# Patient Record
Sex: MALE | Race: WHITE | NOT HISPANIC OR LATINO | Employment: FULL TIME | URBAN - METROPOLITAN AREA
[De-identification: names, ages, dates, MRNs, and addresses within clinical notes are randomized per-mention and may not be internally consistent; named-entity substitution may affect disease eponyms.]

---

## 2017-02-13 ENCOUNTER — HOSPITAL ENCOUNTER (EMERGENCY)
Facility: CLINIC | Age: 27
Discharge: HOME | End: 2017-02-13
Attending: EMERGENCY MEDICINE | Admitting: EMERGENCY MEDICINE
Payer: COMMERCIAL

## 2017-02-13 VITALS
OXYGEN SATURATION: 100 % | DIASTOLIC BLOOD PRESSURE: 81 MMHG | HEIGHT: 65 IN | RESPIRATION RATE: 18 BRPM | SYSTOLIC BLOOD PRESSURE: 137 MMHG | BODY MASS INDEX: 28.32 KG/M2 | TEMPERATURE: 97.6 F | WEIGHT: 170 LBS

## 2017-02-13 DIAGNOSIS — R06.02 SOB (SHORTNESS OF BREATH): ICD-10-CM

## 2017-02-13 DIAGNOSIS — R07.89 CHEST PRESSURE: ICD-10-CM

## 2017-02-13 DIAGNOSIS — T50.905A MEDICATION REACTION, INITIAL ENCOUNTER: ICD-10-CM

## 2017-02-13 LAB
GLUCOSE BLDC GLUCOMTR-MCNC: 214 MG/DL (ref 70–99)
INTERPRETATION ECG - MUSE: NORMAL

## 2017-02-13 PROCEDURE — 99284 EMERGENCY DEPT VISIT MOD MDM: CPT

## 2017-02-13 PROCEDURE — 00000146 ZZHCL STATISTIC GLUCOSE BY METER IP

## 2017-02-13 PROCEDURE — 93005 ELECTROCARDIOGRAM TRACING: CPT

## 2017-02-13 ASSESSMENT — ENCOUNTER SYMPTOMS
SHORTNESS OF BREATH: 1
LIGHT-HEADEDNESS: 1
DIAPHORESIS: 1
PALPITATIONS: 1

## 2017-02-13 NOTE — ED AVS SNAPSHOT
Emergency Department    [address] [facility] [address]    Phone:  [phone]    Fax:  [phone]                                       Ubaldo Zamora   MRN: [id number]    Department:   Emergency Department   Date of Visit:  2/13/2017           Patient Information     Date Of Birth          1990        Your diagnoses for this visit were:     Chest pressure - resolved     SOB (shortness of breath) - resolved     Medication reaction, initial encounter - possible        You were seen by Trierweiler, Chad A, MD.      Follow-up Information     Follow up with Primary care physician In 1 week.        Follow up with  Emergency Department.    Specialty:  EMERGENCY MEDICINE    Why:  If symptoms worsen    Contact information:    [address] [facility] [address] [phone]        Discharge Instructions       Discharge Instructions  Chest Pain    You have been seen today for chest pain or discomfort.  At this time, your doctor has found no signs that your chest pain is due to a serious or life-threatening condition, (or you have declined more testing and/or admission to the hospital). However, sometimes there is a serious problem that does not show up right away. Your evaluation today may not be complete and you may need further testing and evaluation.     You need to follow-up with your regular doctor within 3 days.    Return to the Emergency Department if:    Your chest pain changes, gets worse, starts to happen more often, or comes with less activity.    You are short of breath.    You get very weak or tired.    You pass out or faint.    You have any new symptoms, like fever, cough, numb legs, or you cough up blood.    You have anything else that worries you.    Until you follow-up with your regular doctor please do the following:    Take one aspirin daily unless you have an allergy or are told not to by your doctor.    If a stress test appointment has been made, go  to the appointment.    If you have questions, contact your regular doctor.    If your doctor today has told you to follow-up with your regular doctor, it is very important that you make an appointment with your clinic and go to the appointment.  If you do not follow-up with your primary doctor, it may result in missing an important development which could result in permanent injury or disability and/or lasting pain.  If there is any problem keeping your appointment, call your doctor or return to the Emergency Department.    If you were given a prescription for medicine here today, be sure to read all of the information (including the package insert) that comes with your prescription.  This will include important information about the medicine, its side effects, and any warnings that you need to know about.  The pharmacist who fills the prescription can provide more information and answer questions you may have about the medicine.  If you have questions or concerns that the pharmacist cannot address, please call or return to the Emergency Department.           24 Hour Appointment Hotline       To make an appointment at any [facility], call [phone] ([phone]). If you don't have a family doctor or clinic, we will help you find one. [facility] are conveniently located to serve the needs of you and your family.             Review of your medicines      Our records show that you are taking the medicines listed below. If these are incorrect, please call your family doctor or clinic.        Dose / Directions Last dose taken    benzocaine 20 % Gel gel   Commonly known as:  ORAJEL MAXIMUM STRENGTH   Quantity:  14 g        Take by mouth 4 times daily as needed for moderate pain   Refills:  2        * blood glucose monitoring meter device kit   Quantity:  1 kit        Use to test blood sugars 2 times daily or as directed.   Refills:  0        * blood glucose monitoring meter device kit   Commonly known  as:  no brand specified   Quantity:  1 kit        Use to test blood sugar 2 times daily or as directed.   Refills:  0        blood glucose monitoring test strip   Commonly known as:  no brand specified   Quantity:  1 Box        Use to test blood sugars 2 times daily or as directed   Refills:  0        cephALEXin 500 MG capsule   Commonly known as:  KEFLEX   Dose:  500 mg   Quantity:  40 capsule        Take 1 capsule (500 mg) by mouth 4 times daily   Refills:  0        * gemfibrozil 600 MG tablet   Commonly known as:  LOPID   Dose:  300 mg   Quantity:  60 tablet        Take 0.5 tablets by mouth 2 times daily (before meals).   Refills:  0        * gemfibrozil 600 MG tablet   Commonly known as:  LOPID   Quantity:  60 tablet        300 mg 2X daily before meals   Refills:  1        HYDROcodone-acetaminophen 5-325 MG per tablet   Commonly known as:  NORCO   Dose:  1-2 tablet   Quantity:  15 tablet        Take 1-2 tablets by mouth every 6 hours as needed for moderate to severe pain   Refills:  0        insulin aspart 100 UNIT/ML injection   Commonly known as:  NovoLOG FLEXPEN   Quantity:  9 mL        4 units before breakfast, 4 units before lunch, 4 units before dinner   Refills:  1        * insulin glargine 100 UNIT/ML injection   Commonly known as:  LANTUS        Inject Subcutaneous At Bedtime   Refills:  0        * insulin glargine 100 UNIT/ML injection   Commonly known as:  LANTUS SOLOSTAR   Quantity:  9 mL        20 units every morning   Refills:  3        metFORMIN 500 MG 24 hr tablet   Commonly known as:  GLUCOPHAGE-XR   Dose:  1000 mg   Quantity:  30 tablet        Take 2 tablets (1,000 mg) by mouth daily (with dinner)   Refills:  1        omega-3 acid ethyl esters 1 G capsule   Commonly known as:  Lovaza   Dose:  2 g   Quantity:  120 capsule        Take 2 capsules (2 g) by mouth daily   Refills:  3        * Notice:  This list has 6 medication(s) that are the same as other medications prescribed for you. Read the  directions carefully, and ask your doctor or other care provider to review them with you.            Procedures and tests performed during your visit     EKG 12-lead, tracing only    Glucose by meter    Glucose monitor nursing POCT      Orders Needing Specimen Collection     None      Pending Results     No orders found from 2/11/2017 to 2/14/2017.            Pending Culture Results     No orders found from 2/11/2017 to 2/14/2017.             Test Results from your hospital stay     2/13/2017 10:31 PM - Interface, Flexilab Results      Component Results     Component Value Ref Range & Units Status    Glucose 214 (H) 70 - 99 mg/dL Final                Clinical Quality Measure: Blood Pressure Screening     Your blood pressure was checked while you were in the emergency department today. The last reading we obtained was  BP: 137/81 . Please read the guidelines below about what these numbers mean and what you should do about them.  If your systolic blood pressure (the top number) is less than 120 and your diastolic blood pressure (the bottom number) is less than 80, then your blood pressure is normal. There is nothing more that you need to do about it.  If your systolic blood pressure (the top number) is 120-139 or your diastolic blood pressure (the bottom number) is 80-89, your blood pressure may be higher than it should be. You should have your blood pressure rechecked within a year by a primary care provider.  If your systolic blood pressure (the top number) is 140 or greater or your diastolic blood pressure (the bottom number) is 90 or greater, you may have high blood pressure. High blood pressure is treatable, but if left untreated over time it can put you at risk for heart attack, stroke, or kidney failure. You should have your blood pressure rechecked by a primary care provider within the next 4 weeks.  If your provider in the emergency department today gave you specific instructions to follow-up with your doctor  or provider even sooner than that, you should follow that instruction and not wait for up to 4 weeks for your follow-up visit.        Thank you for choosing [facility]       Thank you for choosing [facility] for your care. Our goal is always to provide you with excellent care. Hearing back from our patients is one way we can continue to improve our services. Please take a few minutes to complete the written survey that you may receive in the mail after you visit with us. Thank you!        [employer] Information     [employer] gives you secure access to your electronic health record. If you see a primary care provider, you can also send messages to your care team and make appointments. If you have questions, please call your primary care clinic.  If you do not have a primary care provider, please call [phone] and they will assist you.        Care EveryWhere ID     This is your Care EveryWhere ID. This could be used by other organizations to access your [facility] medical records  [id number]        After Visit Summary       This is your record. Keep this with you and show to your community pharmacist(s) and doctor(s) at your next visit.

## 2017-02-13 NOTE — ED AVS SNAPSHOT
Emergency Department    [address] [place] [address]    Phone:  [phone]    Fax:  [phone]                                       Ubaldo Zamora   MRN: [id number]    Department:   Emergency Department   Date of Visit:  2/13/2017           After Visit Summary Signature Page     I have received my discharge instructions, and my questions have been answered. I have discussed any challenges I see with this plan with the nurse or doctor.    ..........................................................................................................................................  Patient/Patient Representative Signature      ..........................................................................................................................................  Patient Representative Print Name and Relationship to Patient    ..................................................               ................................................  Date                                            Time    ..........................................................................................................................................  Reviewed by Signature/Title    ...................................................              ..............................................  Date                                                            Time

## 2017-02-14 NOTE — ED PROVIDER NOTES
History     Chief Complaint:  Chest Pain     HPI   Ubaldo Zamora is a 26 year old male with a history of Type 1 diabetes who presents with chest pain. The patient states that he stopped taking his insulin approximately 5 months ago and has not been checking his blood sugars. He states that today when he got up, he decided to check his blood sugar which was 212 and decided to start taking his insulin again. At that time, he took 20 units. At 1600 he took his blood sugar again after eating and noted that it was 286. He then took 14 units of novolog. While watching TV this evening at 2130, he had the sudden onset of palpitations described as a racing heart as well as chest tightness and shortness of breath. He states that he also had diaphoresis and light headedness at that time. He took his blood sugar and noted that it was 149. The patient's girlfriend notes that at the time of the episode, the patient was breathing rapidly and had shallow breaths. The patient has never had symptoms like this previously.  Of note, that he recently has had increased stress as he had to move out of his house for a short amount of time and was living with people that he did not like.     Cardiac Risk Factors   Sex: Male   Tobacco: Negative   Hypertension: Negative  Diabetes: Positive  Hyperlipidemia: Negative  Family History: Positive    Allergies:  No known drug allergies.     Medications:    Omega-3  Lopid     Past Medical History:    Diabetes    Past Surgical History:    Tonsillectomy and adenoidectomy   Scrotum surgery     Family History:    Depression-Mother, Father  CAD-Mother  Diabetes-Mother  Hypertension-Mother, Father  Hyperlipidemia-Father  Obesity Mother  Alcoholism-Father  Asthma-Father    Social History:  Marital Status: Single  Presents to the ED with significant other  Tobacco Use: Current Every Day Smoker, 1.00 pack/day  Alcohol Use: No     Review of Systems   Constitutional: Positive for diaphoresis.  "  Respiratory: Positive for shortness of breath.    Cardiovascular: Positive for chest pain and palpitations.   Neurological: Positive for light-headedness.   All other systems reviewed and are negative.    Physical Exam   First Vitals:  BP: 137/81  Heart Rate: 101  Temp: 97.6  F (36.4  C)  Resp: 18  Height: 165.1 cm (5' 5\")  Weight: 77.1 kg (170 lb)  SpO2: 100 %    Physical Exam  Eye:  Pupils are equal, round, and reactive.  Extraocular movements intact.    ENT:  No rhinorrhea.  Moist mucus membranes.  Normal tongue and tonsil.    Cardiac:  Regular rate and rhythm.  No murmurs, gallops, or rubs.    Pulmonary:  Clear to auscultation bilaterally.  No wheezes, rales, or rhonchi.    Abdomen:  Positive bowel sounds.  Abdomen is soft and non-distended, without focal tenderness.    Musculoskeletal:  Normal movement of all extremities without evidence for deficit.    Skin:  Warm and dry without rashes.    Neurologic:  Non-focal exam without asymmetric weakness or numbness.    Psychiatric:  Normal affect with appropriate interaction with examiner.     Emergency Department Course   ECG:  @ 2212  Indication: chest pain  Vent. Rate 100 bpm. MT interval 132 ms. QRS duration 88 ms. QT/QTc 238/423 ms. P-R-T axis 45 67 27.   Normal sinus rhythm. Normal ECG.  No significant change from ECG on April 26 2013.  Read @ 2340 by Dr. Trierweiler.     Laboratory:  (2225) Glucose by meter: 214 (H)     Emergency Department Course:  Nursing notes and vitals reviewed.  I performed an exam of the patient as documented above.   EKG was done, interpretation as above.   Findings and plan explained to the patient. Patient discharged home with instructions regarding supportive care, medications, and reasons to return. The importance of close follow-up was reviewed.     Impression & Plan    Medical Decision Making:  This young man who is an early type 1 diabetic, intermittently on medications and off of his insulin for the last several months " "presents to us with what seems to be consistent with either a panic attack or a hypoglycemia attack. He just restarted his insulin today, and describes having the sensation of \"impending doom\" with chest tightness, palpitations, and shortness of breath. His significant other observed this and noted that he was hyperventilating and this seemed consistent with more of a panic attack. He does describe multiple stressors recently. However, it is hard to ignore the timing of this as he just restarted his insulin today. His blood sugar was as low as 140 at home, in the 200s here. He otherwise is completely resolved of all symptoms and I feel this is highly unlikely to represent acute coronary syndrome, pulmonary embolism, aortic dissection, or other nefarious intrathoracic pathologies. His EKG is otherwise normal and he feels well. I do not feel that further workup is indicated at this juncture, though I advised immediate return to us if he develops any return of his discomfort or any other emergent concerns. He was advised to continue on his insulin and to follow up closely with his doctor to make sure that his numbers are appropriate.      Diagnosis:    ICD-10-CM    1. Chest pressure - resolved R07.89    2. SOB (shortness of breath) - resolved R06.02    3. Medication reaction, initial encounter - possible T88.7XXA        Disposition:  discharged to home        I, Lexus Pena, am serving as a scribe on 2/13/2017 at 11:41 PM to personally document services performed by Dr. Trierweiler based on my observations and the provider's statements to me.    2/13/2017    EMERGENCY DEPARTMENT       Trierweiler, Chad A, MD  02/14/17 2114    "

## 2019-05-03 ENCOUNTER — HOSPITAL ENCOUNTER (EMERGENCY)
Facility: CLINIC | Age: 29
Discharge: HOME | End: 2019-05-03
Attending: EMERGENCY MEDICINE | Admitting: EMERGENCY MEDICINE

## 2019-05-03 VITALS
HEIGHT: 65 IN | BODY MASS INDEX: 26.66 KG/M2 | RESPIRATION RATE: 16 BRPM | WEIGHT: 160 LBS | TEMPERATURE: 98.8 F | OXYGEN SATURATION: 98 % | DIASTOLIC BLOOD PRESSURE: 91 MMHG | SYSTOLIC BLOOD PRESSURE: 130 MMHG

## 2019-05-03 DIAGNOSIS — S61.209A AVULSION OF FINGER TIP, INITIAL ENCOUNTER: ICD-10-CM

## 2019-05-03 PROCEDURE — 25000128 H RX IP 250 OP 636: Performed by: EMERGENCY MEDICINE

## 2019-05-03 PROCEDURE — 90471 IMMUNIZATION ADMIN: CPT

## 2019-05-03 PROCEDURE — 99283 EMERGENCY DEPT VISIT LOW MDM: CPT | Mod: 25

## 2019-05-03 PROCEDURE — 90715 TDAP VACCINE 7 YRS/> IM: CPT | Performed by: EMERGENCY MEDICINE

## 2019-05-03 PROCEDURE — 12001 RPR S/N/AX/GEN/TRNK 2.5CM/<: CPT

## 2019-05-03 RX ADMIN — CLOSTRIDIUM TETANI TOXOID ANTIGEN (FORMALDEHYDE INACTIVATED), CORYNEBACTERIUM DIPHTHERIAE TOXOID ANTIGEN (FORMALDEHYDE INACTIVATED), BORDETELLA PERTUSSIS TOXOID ANTIGEN (GLUTARALDEHYDE INACTIVATED), BORDETELLA PERTUSSIS FILAMENTOUS HEMAGGLUTININ ANTIGEN (FORMALDEHYDE INACTIVATED), BORDETELLA PERTUSSIS PERTACTIN ANTIGEN, AND BORDETELLA PERTUSSIS FIMBRIAE 2/3 ANTIGEN 0.5 ML: 5; 2; 2.5; 5; 3; 5 INJECTION, SUSPENSION INTRAMUSCULAR at 12:48

## 2019-05-03 ASSESSMENT — MIFFLIN-ST. JEOR: SCORE: 1632.64

## 2019-05-03 ASSESSMENT — ENCOUNTER SYMPTOMS: WOUND: 1

## 2019-05-03 NOTE — ED AVS SNAPSHOT
Emergency Department  [address] [place] [address]  Phone:  [phone]  Fax:  [phone]                                    Ubaldo Zamora   MRN: [id number]    Department:   Emergency Department   Date of Visit:  5/3/2019           After Visit Summary Signature Page    I have received my discharge instructions, and my questions have been answered. I have discussed any challenges I see with this plan with the nurse or doctor.    ..........................................................................................................................................  Patient/Patient Representative Signature      ..........................................................................................................................................  Patient Representative Print Name and Relationship to Patient    ..................................................               ................................................  Date                                   Time    ..........................................................................................................................................  Reviewed by Signature/Title    ...................................................              ..............................................  Date                                               Time          22EPIC Rev 08/18

## 2019-05-03 NOTE — DISCHARGE INSTRUCTIONS
You should make an appointment to follow-up with Dr. Wilhelm for recheck.  I cannot recommend you call her clinic even tonight to schedule appointment for first thing next week.  You should return the emergency department for worsening pain in your finger, swelling in your finger develop a fever or redness.  Should you have worsening pain I recommend you take your bandage off very carefully so as not to disrupt the clot on the tip of the finger.  Should you have concerns of infection you should return medially to the emergency department.  Take Tylenol and ibuprofen for the pain and keep your hand elevated to help with swelling.

## 2019-05-03 NOTE — ED PROVIDER NOTES
"  History     Chief Complaint:  Laceration         Ubaldo Zamora is a 28 year old male who presents with a laceration. The patient reports that cutting lettuce at work, when he chopped his left distal middle finger through the nail and half of the distal end of the finger has been hanging by a flap of skin. This occurred less than two hours ago and he has been compressing the area with a cloth also to lessen the bleeding. He has a history of diabetes but has not eaten today.     Allergies:  NKDA    Medications:    Lovaza  Insulin  Lopid    Past Medical History:    DM  Pancreatitis    Past Surgical History:      ENT    Family History:    CAD  Heart disease  Depression    Social History:  Current smoker: 1 ppd  Negative for alcohol use.    Review of Systems   Skin: Positive for wound.   All other systems reviewed and are negative.    Physical Exam     Patient Vitals for the past 24 hrs:   BP Temp Temp src Heart Rate Resp SpO2 Height Weight   05/03/19 1221 139/82 98.8  F (37.1  C) Oral 95 20 98 % 1.651 m (5' 5\") 72.6 kg (160 lb)         Physical Exam      Constitutional: Alert, attentive, GCS 15  HENT:    Nose: Nose normal.    Mouth/Throat: Oropharynx is clear, mucous membranes are moist  Eyes: EOM are normal, anicteric, conjugate gaze  CV: regular rate and rhythm; no murmurs. Distal extremities normally perfused.   Chest: Effort normal and breath sounds clear without wheezing or rales, symmetric bilaterally . Non labored breathing on room air.   GI:  non tender. No distension. No guarding or rebound.    MSK: No LE edema, no tenderness to palpation of BLE. Avulsion of distal left middle finger on the radial aspect obliquely through the distal nail, no evidence of exposed bone. Wound hemostatic. Distal flap help on by single layer of skin. Non viable.   Neurological: Alert, attentive, moving all extremities equally.   Skin: Skin is warm and dry.      Emergency Department Course     Interventions:    1248 Tdap 0.5 " Lori       Emergency Department Course:  Nursing notes and vitals reviewed. (1235) I performed an exam of the patient as documented above.     Medicine administered as documented above.     (2108) Rechecked the patient.     Findings and plan explained to the Patient. Patient discharged home with instructions regarding supportive care, medications, and reasons to return. The importance of close follow-up was reviewed.     I personally reviewed the laboratory results with the Patient and answered all related questions prior to discharge.     Laceration repair  Date/Time: 5/3/2019 2:55 PM  Performed by: Rodolfo Groves MD  Authorized by: Rodolfo Groves MD   Consent: Verbal consent obtained.  Consent given by: patient  Body area: upper extremity  Location details: left long finger  Laceration length: 2 cm  Tendon involvement: none  Nerve involvement: none  Vascular damage: no  Anesthesia: digital block    Anesthesia:  Local Anesthetic: bupivacaine 0.5% without epinephrine  Anesthetic total: 4 mL  Preparation: Patient was prepped and draped in the usual sterile fashion.  Irrigation solution: saline  Irrigation method: syringe  Amount of cleaning: extensive  Debridement: moderate  Degree of undermining: none  Dressing: tube gauze  Patient tolerance: Patient tolerated the procedure well with no immediate complications            Impression & Plan      Medical Decision Makin-year-old male past medical history significant for diabetes presenting for partial avulsion of his distal third digit on his left hand.  Laceration penetrated obliquely to the distal tip of his finger through the radial aspect of the nail bed into the exposed phalanx on exam.  The distal skin tuft was attached by very superficial layer of skin and is not likely viable.  As such, I did perform sharp excision of this namely for infection control especially in a diabetic patient.  Wound was covered with Surgicel and tube gauze was gently  placed.  Wound care was reviewed and need for follow-up in hand surgery for recheck.  I reviewed signs and symptoms of infection including increasing pain, swelling, redness.  I instructed the patient to leave his dressing in place until follow-up in hand surgery clinic or to remove it very carefully in the next 24 to 48 hours should he have pain not improved with Tylenol and ibuprofen.  Patient stressed understanding this plan and was discharged home.      Diagnosis:    ICD-10-CM    1. Avulsion of finger tip, initial encounter S61.209A        Disposition:  discharged to home  Rodolfo Groves MD   [employer]  2:56 PM 05/03/19     Scribe Disclosure:  I, Jame Bellamy, am serving as a scribe on 5/3/2019 at 12:41 PM to personally document services performed by Rodolfo Groves MD based on my observations and the provider's statements to me.     Jame Bellamy  5/3/2019    EMERGENCY DEPARTMENT       Rodolfo Groves MD  05/03/19 1823

## 2019-12-09 ENCOUNTER — HEALTH MAINTENANCE LETTER (OUTPATIENT)
Age: 29
End: 2019-12-09

## 2020-03-15 ENCOUNTER — HEALTH MAINTENANCE LETTER (OUTPATIENT)
Age: 30
End: 2020-03-15

## 2022-05-23 ENCOUNTER — HOSPITAL ENCOUNTER (EMERGENCY)
Facility: CLINIC | Age: 32
Discharge: HOME | End: 2022-05-23
Attending: EMERGENCY MEDICINE | Admitting: EMERGENCY MEDICINE

## 2022-05-23 VITALS
HEIGHT: 65 IN | DIASTOLIC BLOOD PRESSURE: 71 MMHG | BODY MASS INDEX: 26.66 KG/M2 | OXYGEN SATURATION: 99 % | TEMPERATURE: 97.3 F | WEIGHT: 160 LBS | SYSTOLIC BLOOD PRESSURE: 133 MMHG | RESPIRATION RATE: 20 BRPM | HEART RATE: 75 BPM

## 2022-05-23 DIAGNOSIS — E13.9 OTHER SPECIFIED DIABETES MELLITUS WITHOUT COMPLICATION, WITHOUT LONG-TERM CURRENT USE OF INSULIN (H): ICD-10-CM

## 2022-05-23 DIAGNOSIS — K08.89 PAIN, DENTAL: ICD-10-CM

## 2022-05-23 DIAGNOSIS — R73.9 HYPERGLYCEMIA: ICD-10-CM

## 2022-05-23 LAB
ANION GAP SERPL CALCULATED.3IONS-SCNC: 5 MMOL/L (ref 3–14)
BUN SERPL-MCNC: 15 MG/DL (ref 7–30)
CALCIUM SERPL-MCNC: 9.3 MG/DL (ref 8.5–10.1)
CHLORIDE BLD-SCNC: 99 MMOL/L (ref 94–109)
CO2 SERPL-SCNC: 26 MMOL/L (ref 20–32)
CREAT SERPL-MCNC: 0.65 MG/DL (ref 0.66–1.25)
GFR SERPL CREATININE-BSD FRML MDRD: >90 ML/MIN/1.73M2
GLUCOSE BLD-MCNC: 568 MG/DL (ref 70–99)
GLUCOSE BLDC GLUCOMTR-MCNC: 326 MG/DL (ref 70–99)
GLUCOSE BLDC GLUCOMTR-MCNC: >600 MG/DL (ref 70–99)
POTASSIUM BLD-SCNC: 3.9 MMOL/L (ref 3.4–5.3)
SODIUM SERPL-SCNC: 130 MMOL/L (ref 133–144)

## 2022-05-23 PROCEDURE — 36415 COLL VENOUS BLD VENIPUNCTURE: CPT | Performed by: EMERGENCY MEDICINE

## 2022-05-23 PROCEDURE — 99283 EMERGENCY DEPT VISIT LOW MDM: CPT | Mod: 25

## 2022-05-23 PROCEDURE — 258N000003 HC RX IP 258 OP 636: Performed by: EMERGENCY MEDICINE

## 2022-05-23 PROCEDURE — 96360 HYDRATION IV INFUSION INIT: CPT

## 2022-05-23 PROCEDURE — 80048 BASIC METABOLIC PNL TOTAL CA: CPT | Performed by: EMERGENCY MEDICINE

## 2022-05-23 PROCEDURE — 96361 HYDRATE IV INFUSION ADD-ON: CPT

## 2022-05-23 PROCEDURE — 250N000013 HC RX MED GY IP 250 OP 250 PS 637: Performed by: EMERGENCY MEDICINE

## 2022-05-23 RX ORDER — CHLORHEXIDINE GLUCONATE ORAL RINSE 1.2 MG/ML
15 SOLUTION DENTAL 2 TIMES DAILY
Qty: 473 ML | Refills: 0 | Status: SHIPPED | OUTPATIENT
Start: 2022-05-23 | End: 2022-06-01

## 2022-05-23 RX ORDER — IBUPROFEN 600 MG/1
600 TABLET, FILM COATED ORAL EVERY 8 HOURS PRN
Qty: 30 TABLET | Refills: 0 | Status: SHIPPED | OUTPATIENT
Start: 2022-05-23 | End: 2022-06-01

## 2022-05-23 RX ORDER — ACETAMINOPHEN 500 MG
1000 TABLET ORAL ONCE
Status: COMPLETED | OUTPATIENT
Start: 2022-05-23 | End: 2022-05-23

## 2022-05-23 RX ORDER — SODIUM CHLORIDE 9 MG/ML
INJECTION, SOLUTION INTRAVENOUS CONTINUOUS
Status: DISCONTINUED | OUTPATIENT
Start: 2022-05-23 | End: 2022-05-23 | Stop reason: HOSPADM

## 2022-05-23 RX ORDER — ACETAMINOPHEN 500 MG
1000 TABLET ORAL EVERY 8 HOURS PRN
Qty: 60 TABLET | Refills: 0 | Status: SHIPPED | OUTPATIENT
Start: 2022-05-23 | End: 2022-05-30

## 2022-05-23 RX ADMIN — ACETAMINOPHEN 1000 MG: 500 TABLET, FILM COATED ORAL at 12:21

## 2022-05-23 RX ADMIN — SODIUM CHLORIDE 1000 ML: 9 INJECTION, SOLUTION INTRAVENOUS at 12:14

## 2022-05-23 RX ADMIN — SODIUM CHLORIDE 1000 ML: 9 INJECTION, SOLUTION INTRAVENOUS at 12:56

## 2022-05-23 NOTE — DISCHARGE INSTRUCTIONS
Please follow up with a dentist as soon as possible.  Return to the ED if you notice fevers, increasing swelling or pain, difficulty swallowing or drooling, voice changes or other acute concerns.  May use tylenol and ibuprofen for pain control.  Please complete course of antibiotics for dental infection.      Dental Providers    EMERGENCY DENTAL CARE    [facility]       [phone]  [facility] Dental [facility] (Hours 9a-9p)  [phone]  [facility]    [phone]  [facility]   [phone]        REFERRALS    [place] [facility]    [phone]  [facility]    [phone]  [facility]    [phone]    DENTAL CLINICS    Many of these clinics have reduced cost or payment plans, some take walk-ins. Call the clinic to get this information.      [facility]     [phone]  [facility]      [phone]  [facility]   [phone]   [facility]      [phone]  [facility]   [phone]  [facility]     [phone]  [facility] (appointment only)   [phone]  [facility]      [phone]  [facility]      [phone]  [facility]     [phone]  [facility]    [phone]  [facility]     [phone]  [facility]     [phone]  [facility] (free tooth pulling Monday & Wednesday) [phone]  [facility]:   Adults       [phone]   Children      [phone]   Emergency      [phone]  [facility]     [phone]  [facility]       [phone]  [facility]     [phone]    Discharge Instructions  Dental Pain    You have been seen today for a toothache. Your pain may be caused by an exposed nerve, an infection (pulpitis), a root abscess (pocket  of pus), or other problems. You will need to see a dentist for a solution to your tooth problem. Emergency Department care is only to help control your problem until you can see a dentist; we cannot provide complete dental care.  Today, we did not find any sign that your toothache was caused by any dangerous or life-threatening condition, but sometimes symptoms develop over time and cannot be found during an emergency visit, so it is very important that you follow up with your dentist.      Generally, every Emergency Department visit should have a follow-up clinic visit with either a primary or a specialty clinic/provider. Please follow-up as instructed by your emergency provider today.    Return to the Emergency Department if:  You develop a new fever over 100.4 F.  You cannot open your mouth normally, cannot move your tongue well, or cannot swallow.  You have new or increased swelling of your face or neck.  You develop drainage of pus or foul smelling material from around your tooth.  What can I do to help myself?  Take any antibiotic the provider may have prescribed for you today.  Avoid very hot or very cold foods as both can cause pain.  Make an appointment to see a dentist as soon as possible. Dentists are generally not  on-staff  at hospitals so we cannot  refer  to you to dentist but we may be able to provide a list of dental clinics to help you.  If you were given a prescription for medicine here today, be sure to read all of the information (including the package insert) that comes with your prescription.  This will include important information about the medicine, its side effects, and any warnings that you need to know about.  The pharmacist who fills the prescription can provide more information and answer questions you may have about the medicine.  If you have questions or concerns that the pharmacist cannot address, please call or return to the Emergency Department.   Remember that you can always come  back to the Emergency Department if you are not able to see your regular provider in the amount of time listed above, if you get any new symptoms, or if there is anything that worries you.

## 2022-05-23 NOTE — ED PROVIDER NOTES
History     Chief Complaint:  Dental Pain      HPI   Ubaldo Zamora is a 31 year old male who presents with dental pain.  Patient reports ongoing pain for the last week.  He occasionally has abscesses burst in his mouth and that temporarily relieves his pain.  He last was on antibiotics for dental pain 6 months prior.  Today the pain is worsened.  Tylebol ibuprofen only help so much.  He feels like his face is slightly swollen as well. This prompted his ER visit.  He denies any cough, runny nose, abdominal pain.  He vomited once from the pain last week.  He also notes that he has a history of diabetes.  He has been off medication for several years.  He has not checked his sugar in a very long time.    Review of Systems   All other systems reviewed and are negative.    Allergies:    No Known Allergies      Medications:      acetaminophen (TYLENOL) 500 MG tablet  amoxicillin-clavulanate (AUGMENTIN) 875-125 MG tablet  chlorhexidine (PERIDEX) 0.12 % solution  ibuprofen (ADVIL/MOTRIN) 600 MG tablet  benzocaine (ORAJEL MAXIMUM STRENGTH) 20 % GEL  blood glucose monitoring (NO BRAND SPECIFIED) meter device kit  blood glucose monitoring (NO BRAND SPECIFIED) test strip  Blood Glucose Monitoring Suppl (ACCU-CHEK COMPACT CARE KIT) KIT  cephALEXin (KEFLEX) 500 MG capsule  gemfibrozil (LOPID) 600 MG tablet  gemfibrozil (LOPID) 600 MG tablet  HYDROcodone-acetaminophen (NORCO) 5-325 MG per tablet  insulin aspart (NOVOLOG FLEXPEN) 100 UNIT/ML soln  insulin glargine (LANTUS SOLOSTAR) 100 UNIT/ML PEN  insulin glargine (LANTUS) 100 UNIT/ML PEN  metFORMIN (GLUCOPHAGE-XR) 500 MG 24 hr tablet  omega-3 acid ethyl esters (LOVAZA) 1 G capsule        Past Medical History:      Past Medical History:   Diagnosis Date     Diabetes (H)      Patient Active Problem List    Diagnosis Date Noted     Acute pancreatitis 04/28/2013     Priority: Medium     DKA (diabetic ketoacidoses) 04/26/2013     Priority: Medium     Replacing diagnoses that were  "inactivated after the 10/1/2021 regulatory import.          Past Surgical History:      Past Surgical History:   Procedure Laterality Date     GENITOURINARY SURGERY      scrotum     TONSILLECTOMY, ADENOIDECTOMY, COMBINED         Family History:      Family History   Problem Relation Age of Onset     Depression Mother      Coronary Artery Disease Mother      Heart Disease Mother      Diabetes Mother      Hypertension Mother      Stomach Problem Mother      Obesity Mother      Depression Father      Hypertension Father      Alcoholism Father      Hyperlipidemia Father      Mental Illness Father      Asthma Father      Depression Maternal Grandmother      Hyperlipidemia Maternal Grandmother      Colon Cancer Maternal Grandmother      Diabetes Maternal Grandmother      Depression Maternal Grandfather      Hyperlipidemia Maternal Grandfather      Coronary Artery Disease Maternal Grandfather      Diabetes Maternal Grandfather      Hyperlipidemia Paternal Grandmother      Coronary Artery Disease Paternal Grandmother      Diabetes Paternal Grandmother      Diabetes Paternal Grandfather        Social History:  No Ref-Primary, Physician  The patient presents to the ED alone.      Denies street drug or alcohol use.  Smokes tobacco.      Physical Exam     Patient Vitals for the past 24 hrs:   BP Temp Temp src Pulse Resp SpO2 Height Weight   05/23/22 1440 133/71 -- -- 75 20 99 % -- --   05/23/22 1358 123/75 -- -- 79 -- 99 % -- --   05/23/22 1330 135/72 -- -- 86 -- -- -- --   05/23/22 1301 (!) 140/88 -- -- 80 -- 99 % -- --   05/23/22 1053 (!) 178/108 97.3  F (36.3  C) Temporal 95 20 100 % 1.651 m (5' 5\") 72.6 kg (160 lb)       Physical Exam  General: Resting on the bed.  Head: No obvious trauma to head.  Ears, Nose, Throat:  External ears normal.  Nose normal.  No pharyngeal erythema, swelling or exudate.  Midline uvula.  Poor dentition throughout, tender precaution especially in the left maxillary molars.  No trismus.  Soft " floor the mouth.  No appreciable abscesses.  Eyes:  Conjunctivae clear.  Pupils are equal, round, and reactive.   Neck: Normal range of motion.  Neck supple.   CV: Regular rate and rhythm.  No murmurs.      Respiratory: Effort normal and breath sounds normal.  No wheezing or crackles.   Gastrointestinal: Soft.  No distension. There is no tenderness.    Skin: Skin is warm and dry.  No rash noted.     Emergency Department Course       Laboratory:  Labs Ordered and Resulted from Time of ED Arrival to Time of ED Departure   BASIC METABOLIC PANEL - Abnormal       Result Value    Sodium 130 (*)     Potassium 3.9      Chloride 99      Carbon Dioxide (CO2) 26      Anion Gap 5      Urea Nitrogen 15      Creatinine 0.65 (*)     Calcium 9.3      Glucose 568 (*)     GFR Estimate >90     GLUCOSE BY METER - Abnormal    GLUCOSE BY METER POCT >600 (*)    GLUCOSE BY METER - Abnormal    GLUCOSE BY METER POCT 326 (*)        Procedures:      Emergency Department Course:             Reviewed:  I reviewed nursing notes, vitals, past medical history and Care Everywhere    Assessments:  1130 I obtained history and examined the patient as noted above.    I rechecked the patient and explained findings.       Interventions:    Medications   0.9% sodium chloride BOLUS (0 mLs Intravenous Stopped 22 1256)     Followed by   sodium chloride 0.9% infusion ( Intravenous Stopped 22 1434)   acetaminophen (TYLENOL) tablet 1,000 mg (1,000 mg Oral Given 22 1221)       Disposition:  The patient was discharged to home.     Impression & Plan        Medical Decision Makin-year-old male presents with dental pain.  Broad differential was pursued include not limited to gingivitis, periapical abscess, Wali's angina, cellulitis, deep space neck infection, sepsis, etc.  Vital signs are reassuring.  Afebrile.  On examination there is no drainable abscess.  Nothing amenable to I&D.  Patient has diffuse poor dentition.  He has diffuse  tenderness to percussion.  Suspect likely periapical abscess is present although difficult to pinpoint exactly where.  Patient is soft with mouth.  No trismus.  No signs of Joellen's angina.  No overlying facial abscess or cellulitis on examination.  No suggestion of deep space neck infection or sepsis based on vital signs.  Medication for imaging.  Patient noted to be a diabetic, glucose greater than 600.  BMP obtained showing no evidence acute electrolyte, metabolic or renal dysfunction.  No signs of DKA.  Patient is asymptomatic.  Fluids were given and sugar did improve.  Patient was given a primary care referral and they made appointment for him on Wednesday.  I think he benefit from seeing a PCP for insulin initiation and glucose monitoring.  Patient feels well enough to discharge.  No recent dental pain we will treat as possible infection with Augmentin and Peridex mouthwash.  Recommend close follow-up with dentist as patient likely needs several teeth removed.  Patient was agreeable with this plan and was discharged home.    Covid-19  Ubaldo Zamora was evaluated during a global COVID-19 pandemic, which necessitated consideration that the patient might be at risk for infection with the SARS-CoV-2 virus that causes COVID-19.   Applicable protocols for evaluation were followed during the patient's care.   COVID-19 was considered as part of the patient's evaluation.    Diagnosis:    ICD-10-CM    1. Pain, dental  K08.89    2. Other specified diabetes mellitus without complication, without long-term current use of insulin (H)  E13.9 Primary Care Referral   3. Hyperglycemia  R73.9        Discharge Medications:  Discharge Medication List as of 5/23/2022  2:34 PM      START taking these medications    Details   acetaminophen (TYLENOL) 500 MG tablet Take 2 tablets (1,000 mg) by mouth every 8 hours as needed for fever or pain, Disp-60 tablet, R-0, E-Prescribe      amoxicillin-clavulanate (AUGMENTIN) 875-125 MG tablet  Take 1 tablet by mouth 2 times daily for 7 days, Disp-14 tablet, R-0, E-Prescribe      chlorhexidine (PERIDEX) 0.12 % solution Swish and spit 15 mLs in mouth 2 times daily, Disp-473 mL, R-0, E-Prescribe      ibuprofen (ADVIL/MOTRIN) 600 MG tablet Take 1 tablet (600 mg) by mouth every 8 hours as needed for moderate pain, Disp-30 tablet, R-0, E-Prescribe              Ashleigh Gomez MD  05/23/22 1918

## 2022-05-23 NOTE — ED NOTES
DATE:  5/23/2022   TIME OF RECEIPT FROM LAB:  1340  LAB TEST:  glucose  LAB VALUE:  568  RESULTS GIVEN WITH READ-BACK TO (PROVIDER):  MD Jason  TIME LAB VALUE REPORTED TO PROVIDER:   2728

## 2022-05-23 NOTE — ED TRIAGE NOTES
"  Pt c/o swelling and pain on the left side of his face. Also c/o dental pain. States he has had \"abcessess\" in his mouth \"off and on for 5 years\". Reports \"broken teeth, missing teeth that didn't get sewn up correctly\" States he can \"handle the pain\" but is concerned about the swelling, states \"I can barely keep my left eye open\"   Triage Assessment     Row Name 05/23/22 1055       Triage Assessment (Adult)    Airway WDL WDL       Respiratory WDL    Respiratory WDL WDL       Skin Circulation/Temperature WDL    Skin Circulation/Temperature WDL X  swelling left side of face       Cardiac WDL    Cardiac WDL WDL       Peripheral/Neurovascular WDL    Peripheral Neurovascular WDL WDL       Cognitive/Neuro/Behavioral WDL    Cognitive/Neuro/Behavioral WDL WDL              "

## 2022-06-01 ENCOUNTER — OFFICE VISIT (OUTPATIENT)
Dept: FAMILY MEDICINE | Facility: CLINIC | Age: 32
End: 2022-06-01

## 2022-06-01 VITALS
WEIGHT: 161 LBS | OXYGEN SATURATION: 100 % | SYSTOLIC BLOOD PRESSURE: 166 MMHG | TEMPERATURE: 97.4 F | BODY MASS INDEX: 26.79 KG/M2 | RESPIRATION RATE: 16 BRPM | HEART RATE: 76 BPM | DIASTOLIC BLOOD PRESSURE: 94 MMHG

## 2022-06-01 DIAGNOSIS — Z11.59 NEED FOR HEPATITIS C SCREENING TEST: ICD-10-CM

## 2022-06-01 DIAGNOSIS — E13.9 OTHER SPECIFIED DIABETES MELLITUS WITHOUT COMPLICATION, WITHOUT LONG-TERM CURRENT USE OF INSULIN (H): ICD-10-CM

## 2022-06-01 DIAGNOSIS — E87.1 HYPONATREMIA: ICD-10-CM

## 2022-06-01 DIAGNOSIS — Z11.4 SCREENING FOR HIV (HUMAN IMMUNODEFICIENCY VIRUS): ICD-10-CM

## 2022-06-01 DIAGNOSIS — K02.9 DENTAL DECAY: ICD-10-CM

## 2022-06-01 DIAGNOSIS — I10 ESSENTIAL HYPERTENSION: ICD-10-CM

## 2022-06-01 DIAGNOSIS — Z13.220 SCREENING FOR HYPERLIPIDEMIA: ICD-10-CM

## 2022-06-01 DIAGNOSIS — E78.1 HYPERTRIGLYCERIDEMIA: ICD-10-CM

## 2022-06-01 DIAGNOSIS — R74.8 LOW SERUM HDL: Primary | ICD-10-CM

## 2022-06-01 PROCEDURE — 83721 ASSAY OF BLOOD LIPOPROTEIN: CPT | Mod: 59 | Performed by: FAMILY MEDICINE

## 2022-06-01 PROCEDURE — 80061 LIPID PANEL: CPT | Performed by: FAMILY MEDICINE

## 2022-06-01 PROCEDURE — 99204 OFFICE O/P NEW MOD 45 MIN: CPT | Performed by: FAMILY MEDICINE

## 2022-06-01 PROCEDURE — 90677 PCV20 VACCINE IM: CPT | Performed by: FAMILY MEDICINE

## 2022-06-01 PROCEDURE — 80053 COMPREHEN METABOLIC PANEL: CPT | Performed by: FAMILY MEDICINE

## 2022-06-01 PROCEDURE — 36415 COLL VENOUS BLD VENIPUNCTURE: CPT | Performed by: FAMILY MEDICINE

## 2022-06-01 PROCEDURE — 87389 HIV-1 AG W/HIV-1&-2 AB AG IA: CPT | Performed by: FAMILY MEDICINE

## 2022-06-01 PROCEDURE — 82043 UR ALBUMIN QUANTITATIVE: CPT | Performed by: FAMILY MEDICINE

## 2022-06-01 PROCEDURE — 86803 HEPATITIS C AB TEST: CPT | Performed by: FAMILY MEDICINE

## 2022-06-01 RX ORDER — LISINOPRIL 10 MG/1
10 TABLET ORAL DAILY
Qty: 30 TABLET | Refills: 2 | Status: ON HOLD | OUTPATIENT
Start: 2022-06-01 | End: 2023-07-20

## 2022-06-01 RX ORDER — LANCETS
EACH MISCELLANEOUS
Qty: 100 EACH | Refills: 6 | Status: SHIPPED | OUTPATIENT
Start: 2022-06-01 | End: 2023-07-15

## 2022-06-01 RX ORDER — METFORMIN HCL 500 MG
2000 TABLET, EXTENDED RELEASE 24 HR ORAL
Qty: 120 TABLET | Refills: 1 | Status: SHIPPED | OUTPATIENT
Start: 2022-07-01 | End: 2023-07-15

## 2022-06-01 RX ORDER — GLUCOSAMINE HCL/CHONDROITIN SU 500-400 MG
CAPSULE ORAL
Qty: 100 EACH | Refills: 3 | Status: SHIPPED | OUTPATIENT
Start: 2022-06-01 | End: 2023-07-15

## 2022-06-01 RX ORDER — METFORMIN HCL 500 MG
TABLET, EXTENDED RELEASE 24 HR ORAL
Qty: 30 TABLET | Refills: 1 | Status: SHIPPED | OUTPATIENT
Start: 2022-06-01 | End: 2022-06-02

## 2022-06-01 NOTE — PATIENT INSTRUCTIONS
I will review your studies via [employer] when they are available. If you have any questions or concerns please let me know via [facility], or call the clinic.
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
## 2022-06-02 ENCOUNTER — TELEPHONE (OUTPATIENT)
Dept: FAMILY MEDICINE | Facility: CLINIC | Age: 32
End: 2022-06-02

## 2022-06-02 ENCOUNTER — PATIENT OUTREACH (OUTPATIENT)
Dept: CARE COORDINATION | Facility: CLINIC | Age: 32
End: 2022-06-02

## 2022-06-02 DIAGNOSIS — E13.9 OTHER SPECIFIED DIABETES MELLITUS WITHOUT COMPLICATION, WITHOUT LONG-TERM CURRENT USE OF INSULIN (H): ICD-10-CM

## 2022-06-02 LAB
CREAT UR-MCNC: 70 MG/DL
HCV AB SERPL QL IA: NONREACTIVE
HIV 1+2 AB+HIV1 P24 AG SERPL QL IA: NONREACTIVE
MICROALBUMIN UR-MCNC: 41 MG/L
MICROALBUMIN/CREAT UR: 58.57 MG/G CR (ref 0–17)

## 2022-06-02 RX ORDER — METFORMIN HCL 500 MG
TABLET, EXTENDED RELEASE 24 HR ORAL
Qty: 98 TABLET | Refills: 0 | Status: SHIPPED | OUTPATIENT
Start: 2022-06-02 | End: 2023-07-15

## 2022-06-02 NOTE — PROGRESS NOTES
Clinic Care Coordination Contact  [facility]/Voicemail       Clinical Data: Care Coordinator Outreach  Outreach attempted x 1.  Left message on patient's voicemail with call back information and requested return call.    Plan: Care Coordinator will try to reach patient again in 1-2 business days.    ABELARDO Williamson. Public Health  Community Health Worker  [facility]  Clinic Care Coordination  [phone]

## 2022-06-02 NOTE — LETTER
[facility] CARE COORDINATION  [facility]  Lorena 3, 2022    Ubaldo Zamora  [address] [address]      Dear Ubaldo,    I am a clinic community health worker who works with Rell Byrd DO with the [facility]. I wanted to introduce myself and provide you with my contact information for you to be able to call me with any questions or concerns. Below is a description of clinic care coordination and how I can further assist you.    The clinic care coordination team is made up of a registered nurse, , financial resource worker and community health worker who understand the health care system. The goal of clinic care coordination is to help you manage your health and improve access to the health care system. Our team works alongside your provider to assist you in determining your health and social needs. We can help you obtain health care and community resources, providing you with necessary information and education. We can work with you through any barriers and develop a care plan that helps coordinate and strengthen the communication between you and your care team.    Please feel free to contact me with any questions or concerns care coordination and what we can offer.      We are focused on providing you with the highest-quality healthcare experience possible.    Sincerely,     ABELARDO Williamson. Public Health  Community Health Worker  [place] [place] [facility]  Clinic Care Coordination  [phone]

## 2022-06-02 NOTE — TELEPHONE ENCOUNTER
I spoke with pharmacist about the metformin dosing.  I adjusted the quantity for the first month of treatment to the proper 1 month amount.  The prescription to start in July is correct.  Rell Byrd,  on 6/2/2022 at 10:09 AM

## 2022-06-02 NOTE — TELEPHONE ENCOUNTER
Dr. Byrd,    Pharmacy is asking for clarification on the medication metFORMIN (GLUCOPHAGE XR) 500 MG 24 hr tablet that you prescribed for this patient on 06/01/2022.   Pharmacy states that qty doesn't match with the directions.     Order History  Outpatient  Date/Time Action Taken User Additional Information   06/01/22 1652 Sign Rell Byrd, DO          Outpatient Medication Detail     Disp Refills Start End KRYSTLE   metFORMIN (GLUCOPHAGE XR) 500 MG 24 hr tablet 120 tablet 1 7/1/2022  --   Sig - Route: Take 4 tablets (2,000 mg) by mouth daily (with dinner) - Oral   Sent to pharmacy as: metFORMIN HCl  MG Oral Tablet Extended Release 24 Hour (GLUCOPHAGE XR)   Class: E-Prescribe   Order: [id number]   E-Prescribing Status: Receipt confirmed by pharmacy (6/1/2022  4:52 PM CDT)             Printout Tracking    External Result Report                 Pharmacy    [id number] - [place], [place] - [address]             Associated Diagnoses    Other specified diabetes mellitus without complication, without long-term current use of insulin (H) [E13.9]

## 2022-06-02 NOTE — TELEPHONE ENCOUNTER
Diabetes Education Scheduling Outreach #1:    Call to patient to schedule. Left message with phone number to call to schedule.    Also sent [employer] message for second attempt. Requested patient to call to schedule.    Marisabel Rosales OnCall  Diabetes and Nutrition Scheduling

## 2022-06-03 LAB
ALBUMIN SERPL-MCNC: 3.5 G/DL (ref 3.4–5)
ALP SERPL-CCNC: 156 U/L (ref 40–150)
ALT SERPL W P-5'-P-CCNC: 26 U/L (ref 0–70)
ANION GAP SERPL CALCULATED.3IONS-SCNC: 9 MMOL/L (ref 3–14)
AST SERPL W P-5'-P-CCNC: 14 U/L (ref 0–45)
BILIRUB SERPL-MCNC: 0.2 MG/DL (ref 0.2–1.3)
BUN SERPL-MCNC: 15 MG/DL (ref 7–30)
CALCIUM SERPL-MCNC: 9 MG/DL (ref 8.5–10.1)
CHLORIDE BLD-SCNC: 108 MMOL/L (ref 94–109)
CHOLEST SERPL-MCNC: 215 MG/DL
CO2 SERPL-SCNC: 26 MMOL/L (ref 20–32)
CREAT SERPL-MCNC: 0.84 MG/DL (ref 0.66–1.25)
FASTING STATUS PATIENT QL REPORTED: ABNORMAL
GFR SERPL CREATININE-BSD FRML MDRD: >90 ML/MIN/1.73M2
GLUCOSE BLD-MCNC: 231 MG/DL (ref 70–99)
HDLC SERPL-MCNC: 25 MG/DL
LDLC SERPL CALC-MCNC: ABNORMAL MG/DL
NONHDLC SERPL-MCNC: 190 MG/DL
POTASSIUM BLD-SCNC: 3.9 MMOL/L (ref 3.4–5.3)
PROT SERPL-MCNC: 6.6 G/DL (ref 6.8–8.8)
SODIUM SERPL-SCNC: 143 MMOL/L (ref 133–144)
TRIGL SERPL-MCNC: 678 MG/DL

## 2022-06-03 NOTE — PROGRESS NOTES
Clinic Care Coordination Contact  [facility]/Voicemail       Clinical Data: Care Coordinator Outreach  Outreach attempted x 2.  Writer unable to leave ; call would ring a few times and then end.     Plan: Care Coordinator will send care coordination introduction letter with care coordinator contact information and explanation of care coordination services via [employer]. Care Coordinator will do no further outreaches at this time.    ABELARDO Williamson. Public Health  Community Health Worker  [facility]  Clinic Care Coordination  [phone]

## 2022-06-04 LAB — LDLC SERPL CALC-MCNC: 86 MG/DL

## 2022-06-06 ENCOUNTER — TELEPHONE (OUTPATIENT)
Dept: FAMILY MEDICINE | Facility: CLINIC | Age: 32
End: 2022-06-06

## 2022-06-06 NOTE — TELEPHONE ENCOUNTER
MTM referral from: [facility] ([facility]) visit (referral by provider)    MTM referral outreach attempt #2 on June 6, 2022 at 2:11 PM      Outcome: Patient not reachable after several attempts, will route to MTM Pharmacist/Provider as an FYI.  MT scheduling number is [phone].  Thank you for the referral.    Garth Garner, MTM coordinator      No insurance listed

## 2022-10-03 ENCOUNTER — HOSPITAL ENCOUNTER (EMERGENCY)
Facility: CLINIC | Age: 32
Discharge: HOME | End: 2022-10-04
Attending: EMERGENCY MEDICINE | Admitting: EMERGENCY MEDICINE

## 2022-10-03 DIAGNOSIS — L02.01 ABSCESS OF CHIN: ICD-10-CM

## 2022-10-03 DIAGNOSIS — L03.211 CELLULITIS OF CHIN: ICD-10-CM

## 2022-10-03 PROCEDURE — 99284 EMERGENCY DEPT VISIT MOD MDM: CPT

## 2022-10-03 RX ORDER — CEPHALEXIN 500 MG/1
500 CAPSULE ORAL 4 TIMES DAILY
Qty: 28 CAPSULE | Refills: 0 | Status: SHIPPED | OUTPATIENT
Start: 2022-10-03 | End: 2022-10-10

## 2022-10-03 RX ORDER — SULFAMETHOXAZOLE/TRIMETHOPRIM 800-160 MG
1 TABLET ORAL 2 TIMES DAILY
Qty: 14 TABLET | Refills: 0 | Status: SHIPPED | OUTPATIENT
Start: 2022-10-03 | End: 2022-10-10

## 2022-10-04 VITALS
WEIGHT: 160 LBS | RESPIRATION RATE: 16 BRPM | HEART RATE: 87 BPM | OXYGEN SATURATION: 98 % | BODY MASS INDEX: 26.63 KG/M2 | TEMPERATURE: 97.3 F | SYSTOLIC BLOOD PRESSURE: 136 MMHG | DIASTOLIC BLOOD PRESSURE: 95 MMHG

## 2022-10-04 ASSESSMENT — ENCOUNTER SYMPTOMS
TROUBLE SWALLOWING: 0
WOUND: 1
FEVER: 0
CHILLS: 0

## 2022-10-04 NOTE — ED PROVIDER NOTES
"  History   Chief Complaint:  Wound Infection       HPI   Ubaldo Zamora is a 32 year old male presenting with concerns for left chin abscess.  Reports having 4 days of increasing size redness and pain to the left chin.  Initially started as a pimple and used \"pimple patches\" which brought the swelling to ahead.  Today, it started draining pus.  He denies any systemic symptoms of fevers/chills, difficulty swallowing, difficulty breathing.  Reports prior history of skin abscesses.    Review of Systems   Constitutional: Negative for chills and fever.   HENT: Negative for trouble swallowing.    Respiratory:        Negative for difficulty breathing   Skin: Positive for wound.   All other systems reviewed and are negative.      Allergies:  No Known Allergies    Medications:  Blood glucose  Lisinopril  Metformin  Thin lancets    Past Medical History:     DKA  Acute pancreatitis    Past Surgical History:    Scrotum surgery  Tonsillectomy, adenoidectomy    Family History:    Mother: depression, CAD, heart disease, diabetes, hypertension, obesity  Father: depression, hypertension, alcoholism, hyperlipidemia, mental illness, asthma, cancer    Social History:  Social Determinants of Health     Tobacco Use: High Risk     Smoking Tobacco Use: Current Every Day Smoker     Smokeless Tobacco Use: Never Used   Alcohol Use: Not on file   Financial Resource Strain: Not on file   Food Insecurity: Not on file   Transportation Needs: Not on file   Physical Activity: Not on file   Stress: Not on file   Social Connections: Not on file   Intimate Partner Violence: Not on file   Depression: Not at risk     PHQ-2 Score: 0   Housing Stability: Not on file   PCP: Rell Byrd     Physical Exam     Patient Vitals for the past 24 hrs:   BP Temp Temp src Pulse Resp SpO2 Weight   10/04/22 0022 -- -- -- -- 16 98 % --   10/03/22 2255 (!) 136/95 97.3  F (36.3  C) Temporal 87 18 100 % 72.6 kg (160 lb)       Physical Exam  HENT:      Head: "         General: the patient is awake and interactive  HEENT:  Moist mucous membranes, conjunctiva normal; left chin abscess with notable purulent drainage from multiple sites on the overlying skin (approximately 2 x 3 cm of circular induration with overlying erythema); poor dentition throughout.  No gingival swelling/erythema or abscess.  Floor of mouth is soft.  Posterior oropharynx clear.  Pulmonary:  Normal respiratory effort  Cardiovascular:  Well perfused  Musculoskeletal:  Moving 4 extremities grossly wnl, no deformities  Neuro:  Speech normal, no focal deficits  Psych:  Normal affect     Emergency Department Course     Procedures  None    Emergency Department Course:     Reviewed:  I reviewed nursing notes, vitals, past medical history and Care Everywhere    Assessments:  2325 I obtained history and examined the patient as noted above.    I rechecked the patient and explained findings.     Disposition:  The patient was discharged to home.     Impression & Plan     Medical Decision Makin-year-old male presenting to the ER with purulent draining left chin abscess with surrounding cellulitis.  There is no signs of deep space neck infection or pharyngitis.  No indication for CT imaging or labs.  He is afebrile and well-appearing.  No signs of gingival abscess or odontogenic etiology.  No signs of necrotizing infection.  As the wound is draining, no indication for incision and drainage.  We will start the patient on the antibiotics below and have him follow-up with his outpatient providers for wound recheck.  He is comfortable with this plan.  Discussed with him the reasons to return to the ER.  All questions answered prior to discharge.    Diagnosis:    ICD-10-CM    1. Abscess of chin  L02.01    2. Cellulitis of chin  L03.211        Discharge Medications:  Discharge Medication List as of 10/4/2022 12:17 AM      START taking these medications    Details   cephALEXin (KEFLEX) 500 MG capsule Take 1 capsule  (500 mg) by mouth 4 times daily for 7 days, Disp-28 capsule, R-0, E-Prescribe      sulfamethoxazole-trimethoprim (BACTRIM DS) 800-160 MG tablet Take 1 tablet by mouth 2 times daily for 7 days, Disp-14 tablet, R-0, E-Prescribe              Jose Cruz Castillo MD  10/04/22 0875

## 2022-10-04 NOTE — ED TRIAGE NOTES
Patient presents to triage with c/o abcess on L side of chin. Patient stated he believes it started as an ingrown hair and then it just got a lot bigger and began draining purulent drainage.     Triage Assessment     Row Name 10/03/22 1791       Triage Assessment (Adult)    Airway WDL WDL       Respiratory WDL    Respiratory WDL WDL       Cardiac WDL    Cardiac WDL WDL    Row Name 10/03/22 6002       Triage Assessment (Adult)    Airway WDL WDL       Respiratory WDL    Respiratory WDL WDL       Cardiac WDL    Cardiac WDL WDL

## 2023-07-15 ENCOUNTER — HOSPITAL ENCOUNTER (INPATIENT)
Facility: CLINIC | Age: 33
LOS: 5 days | Discharge: HOME | DRG: 872 | End: 2023-07-20
Attending: EMERGENCY MEDICINE | Admitting: INTERNAL MEDICINE

## 2023-07-15 ENCOUNTER — APPOINTMENT (OUTPATIENT)
Dept: CT IMAGING | Facility: CLINIC | Age: 33
DRG: 872 | End: 2023-07-15
Attending: EMERGENCY MEDICINE

## 2023-07-15 DIAGNOSIS — L03.315 CELLULITIS OF PERINEUM: ICD-10-CM

## 2023-07-15 DIAGNOSIS — K80.20 CALCULUS OF GALLBLADDER WITHOUT CHOLECYSTITIS WITHOUT OBSTRUCTION: ICD-10-CM

## 2023-07-15 DIAGNOSIS — E11.65 POORLY CONTROLLED DIABETES MELLITUS (H): Primary | ICD-10-CM

## 2023-07-15 DIAGNOSIS — E13.9 OTHER SPECIFIED DIABETES MELLITUS WITHOUT COMPLICATION, WITHOUT LONG-TERM CURRENT USE OF INSULIN (H): ICD-10-CM

## 2023-07-15 DIAGNOSIS — R73.9 HYPERGLYCEMIA: ICD-10-CM

## 2023-07-15 DIAGNOSIS — B99.9 RECURRENT INFECTIONS: ICD-10-CM

## 2023-07-15 DIAGNOSIS — E11.628 TYPE 2 DIABETES MELLITUS WITH OTHER SKIN COMPLICATION, WITHOUT LONG-TERM CURRENT USE OF INSULIN (H): ICD-10-CM

## 2023-07-15 LAB
ALBUMIN SERPL BCG-MCNC: 4.3 G/DL (ref 3.5–5.2)
ALBUMIN UR-MCNC: 30 MG/DL
ALP SERPL-CCNC: 219 U/L (ref 40–129)
ALT SERPL W P-5'-P-CCNC: 12 U/L (ref 0–70)
ANION GAP SERPL CALCULATED.3IONS-SCNC: 15 MMOL/L (ref 7–15)
APPEARANCE UR: CLEAR
AST SERPL W P-5'-P-CCNC: 12 U/L (ref 0–45)
B-OH-BUTYR SERPL-SCNC: 1.6 MMOL/L
BASE EXCESS BLDV CALC-SCNC: -1.5 MMOL/L (ref -7.7–1.9)
BASOPHILS # BLD AUTO: 0.1 10E3/UL (ref 0–0.2)
BASOPHILS NFR BLD AUTO: 0 %
BILIRUB SERPL-MCNC: 0.7 MG/DL
BILIRUB UR QL STRIP: NEGATIVE
BUN SERPL-MCNC: 13.6 MG/DL (ref 6–20)
CALCIUM SERPL-MCNC: 9.7 MG/DL (ref 8.6–10)
CHLORIDE SERPL-SCNC: 92 MMOL/L (ref 98–107)
COLOR UR AUTO: ABNORMAL
CREAT SERPL-MCNC: 1.01 MG/DL (ref 0.67–1.17)
CREAT UR-MCNC: 38 MG/DL
DEPRECATED HCO3 PLAS-SCNC: 24 MMOL/L (ref 22–29)
EOSINOPHIL # BLD AUTO: 0.1 10E3/UL (ref 0–0.7)
EOSINOPHIL NFR BLD AUTO: 0 %
ERYTHROCYTE [DISTWIDTH] IN BLOOD BY AUTOMATED COUNT: 11.9 % (ref 10–15)
GFR SERPL CREATININE-BSD FRML MDRD: >90 ML/MIN/1.73M2
GLUCOSE BLDC GLUCOMTR-MCNC: 248 MG/DL (ref 70–99)
GLUCOSE BLDC GLUCOMTR-MCNC: 293 MG/DL (ref 70–99)
GLUCOSE BLDC GLUCOMTR-MCNC: 309 MG/DL (ref 70–99)
GLUCOSE BLDC GLUCOMTR-MCNC: 340 MG/DL (ref 70–99)
GLUCOSE BLDC GLUCOMTR-MCNC: 438 MG/DL (ref 70–99)
GLUCOSE BLDC GLUCOMTR-MCNC: 481 MG/DL (ref 70–99)
GLUCOSE SERPL-MCNC: 533 MG/DL (ref 70–99)
GLUCOSE UR STRIP-MCNC: >=1000 MG/DL
HBA1C MFR BLD: 14.6 %
HCO3 BLDV-SCNC: 26 MMOL/L (ref 21–28)
HCT VFR BLD AUTO: 40.8 % (ref 40–53)
HGB BLD-MCNC: 14.7 G/DL (ref 13.3–17.7)
HGB UR QL STRIP: ABNORMAL
HOLD SPECIMEN: NORMAL
HOLD SPECIMEN: NORMAL
IMM GRANULOCYTES # BLD: 0.3 10E3/UL
IMM GRANULOCYTES NFR BLD: 1 %
KETONES UR STRIP-MCNC: 20 MG/DL
LACTATE SERPL-SCNC: 0.7 MMOL/L (ref 0.7–2)
LACTATE SERPL-SCNC: 1.8 MMOL/L (ref 0.7–2)
LEUKOCYTE ESTERASE UR QL STRIP: NEGATIVE
LIPASE SERPL-CCNC: 19 U/L (ref 13–60)
LYMPHOCYTES # BLD AUTO: 1.6 10E3/UL (ref 0.8–5.3)
LYMPHOCYTES NFR BLD AUTO: 5 %
MCH RBC QN AUTO: 30.1 PG (ref 26.5–33)
MCHC RBC AUTO-ENTMCNC: 36 G/DL (ref 31.5–36.5)
MCV RBC AUTO: 83 FL (ref 78–100)
MICROALBUMIN UR-MCNC: 276 MG/L
MICROALBUMIN/CREAT UR: 726.32 MG/G CR (ref 0–17)
MONOCYTES # BLD AUTO: 2.7 10E3/UL (ref 0–1.3)
MONOCYTES NFR BLD AUTO: 9 %
MUCOUS THREADS #/AREA URNS LPF: PRESENT /LPF
NEUTROPHILS # BLD AUTO: 25.2 10E3/UL (ref 1.6–8.3)
NEUTROPHILS NFR BLD AUTO: 85 %
NITRATE UR QL: NEGATIVE
NRBC # BLD AUTO: 0 10E3/UL
NRBC BLD AUTO-RTO: 0 /100
O2/TOTAL GAS SETTING VFR VENT: 30 %
OSMOLALITY SERPL: 306 MMOL/KG (ref 275–295)
PCO2 BLDV: 54 MM HG (ref 40–50)
PH BLDV: 7.29 [PH] (ref 7.32–7.43)
PH UR STRIP: 5.5 [PH] (ref 5–7)
PLATELET # BLD AUTO: 240 10E3/UL (ref 150–450)
PO2 BLDV: 19 MM HG (ref 25–47)
POTASSIUM SERPL-SCNC: 3.9 MMOL/L (ref 3.4–5.3)
PROT SERPL-MCNC: 7.6 G/DL (ref 6.4–8.3)
RBC # BLD AUTO: 4.89 10E6/UL (ref 4.4–5.9)
RBC URINE: 22 /HPF
SODIUM SERPL-SCNC: 131 MMOL/L (ref 136–145)
SP GR UR STRIP: 1.03 (ref 1–1.03)
SQUAMOUS EPITHELIAL: <1 /HPF
UROBILINOGEN UR STRIP-MCNC: NORMAL MG/DL
WBC # BLD AUTO: 30 10E3/UL (ref 4–11)
WBC URINE: <1 /HPF

## 2023-07-15 PROCEDURE — 36415 COLL VENOUS BLD VENIPUNCTURE: CPT | Performed by: INTERNAL MEDICINE

## 2023-07-15 PROCEDURE — 250N000011 HC RX IP 250 OP 636: Mod: JZ | Performed by: EMERGENCY MEDICINE

## 2023-07-15 PROCEDURE — 83036 HEMOGLOBIN GLYCOSYLATED A1C: CPT | Performed by: PHYSICIAN ASSISTANT

## 2023-07-15 PROCEDURE — 250N000011 HC RX IP 250 OP 636: Performed by: EMERGENCY MEDICINE

## 2023-07-15 PROCEDURE — 258N000003 HC RX IP 258 OP 636: Performed by: EMERGENCY MEDICINE

## 2023-07-15 PROCEDURE — 82010 KETONE BODYS QUAN: CPT | Performed by: EMERGENCY MEDICINE

## 2023-07-15 PROCEDURE — 83605 ASSAY OF LACTIC ACID: CPT | Performed by: INTERNAL MEDICINE

## 2023-07-15 PROCEDURE — 250N000012 HC RX MED GY IP 250 OP 636 PS 637: Performed by: PHYSICIAN ASSISTANT

## 2023-07-15 PROCEDURE — 250N000011 HC RX IP 250 OP 636: Mod: JZ | Performed by: PHYSICIAN ASSISTANT

## 2023-07-15 PROCEDURE — 85025 COMPLETE CBC W/AUTO DIFF WBC: CPT | Performed by: EMERGENCY MEDICINE

## 2023-07-15 PROCEDURE — 120N000001 HC R&B MED SURG/OB

## 2023-07-15 PROCEDURE — 83690 ASSAY OF LIPASE: CPT | Performed by: EMERGENCY MEDICINE

## 2023-07-15 PROCEDURE — 36415 COLL VENOUS BLD VENIPUNCTURE: CPT | Performed by: EMERGENCY MEDICINE

## 2023-07-15 PROCEDURE — 99223 1ST HOSP IP/OBS HIGH 75: CPT | Performed by: PHYSICIAN ASSISTANT

## 2023-07-15 PROCEDURE — 87040 BLOOD CULTURE FOR BACTERIA: CPT | Performed by: EMERGENCY MEDICINE

## 2023-07-15 PROCEDURE — 87070 CULTURE OTHR SPECIMN AEROBIC: CPT | Performed by: EMERGENCY MEDICINE

## 2023-07-15 PROCEDURE — 96365 THER/PROPH/DIAG IV INF INIT: CPT

## 2023-07-15 PROCEDURE — 80053 COMPREHEN METABOLIC PANEL: CPT | Performed by: EMERGENCY MEDICINE

## 2023-07-15 PROCEDURE — 99418 PROLNG IP/OBS E/M EA 15 MIN: CPT | Performed by: PHYSICIAN ASSISTANT

## 2023-07-15 PROCEDURE — 96375 TX/PRO/DX INJ NEW DRUG ADDON: CPT

## 2023-07-15 PROCEDURE — 82962 GLUCOSE BLOOD TEST: CPT

## 2023-07-15 PROCEDURE — 74177 CT ABD & PELVIS W/CONTRAST: CPT

## 2023-07-15 PROCEDURE — 83930 ASSAY OF BLOOD OSMOLALITY: CPT | Performed by: PHYSICIAN ASSISTANT

## 2023-07-15 PROCEDURE — 83605 ASSAY OF LACTIC ACID: CPT | Performed by: EMERGENCY MEDICINE

## 2023-07-15 PROCEDURE — 96374 THER/PROPH/DIAG INJ IV PUSH: CPT | Mod: 59

## 2023-07-15 PROCEDURE — 82803 BLOOD GASES ANY COMBINATION: CPT | Performed by: EMERGENCY MEDICINE

## 2023-07-15 PROCEDURE — 250N000011 HC RX IP 250 OP 636: Performed by: PHYSICIAN ASSISTANT

## 2023-07-15 PROCEDURE — 82570 ASSAY OF URINE CREATININE: CPT | Performed by: PHYSICIAN ASSISTANT

## 2023-07-15 PROCEDURE — 250N000013 HC RX MED GY IP 250 OP 250 PS 637: Performed by: PHYSICIAN ASSISTANT

## 2023-07-15 PROCEDURE — 96367 TX/PROPH/DG ADDL SEQ IV INF: CPT

## 2023-07-15 PROCEDURE — 85004 AUTOMATED DIFF WBC COUNT: CPT | Performed by: EMERGENCY MEDICINE

## 2023-07-15 PROCEDURE — 99285 EMERGENCY DEPT VISIT HI MDM: CPT | Mod: 25

## 2023-07-15 PROCEDURE — 96361 HYDRATE IV INFUSION ADD-ON: CPT

## 2023-07-15 PROCEDURE — 81001 URINALYSIS AUTO W/SCOPE: CPT | Performed by: EMERGENCY MEDICINE

## 2023-07-15 RX ORDER — KETOROLAC TROMETHAMINE 15 MG/ML
15 INJECTION, SOLUTION INTRAMUSCULAR; INTRAVENOUS EVERY 6 HOURS PRN
Status: DISPENSED | OUTPATIENT
Start: 2023-07-15 | End: 2023-07-20

## 2023-07-15 RX ORDER — VANCOMYCIN HYDROCHLORIDE 1 G/200ML
1000 INJECTION, SOLUTION INTRAVENOUS EVERY 12 HOURS
Status: DISCONTINUED | OUTPATIENT
Start: 2023-07-15 | End: 2023-07-17

## 2023-07-15 RX ORDER — LIDOCAINE HYDROCHLORIDE 10 MG/ML
INJECTION, SOLUTION EPIDURAL; INFILTRATION; INTRACAUDAL; PERINEURAL
Status: DISCONTINUED
Start: 2023-07-15 | End: 2023-07-15 | Stop reason: HOSPADM

## 2023-07-15 RX ORDER — SODIUM CHLORIDE AND POTASSIUM CHLORIDE 150; 900 MG/100ML; MG/100ML
INJECTION, SOLUTION INTRAVENOUS CONTINUOUS
Status: DISCONTINUED | OUTPATIENT
Start: 2023-07-15 | End: 2023-07-17

## 2023-07-15 RX ORDER — CONTAINER,EMPTY
1 EACH MISCELLANEOUS SEE ADMIN INSTRUCTIONS
Qty: 1 EACH | Refills: 11 | Status: SHIPPED | OUTPATIENT
Start: 2023-07-15

## 2023-07-15 RX ORDER — DEXTROSE MONOHYDRATE 25 G/50ML
25-50 INJECTION, SOLUTION INTRAVENOUS
Status: DISCONTINUED | OUTPATIENT
Start: 2023-07-15 | End: 2023-07-20 | Stop reason: HOSPADM

## 2023-07-15 RX ORDER — BISACODYL 10 MG
10 SUPPOSITORY, RECTAL RECTAL DAILY PRN
Status: DISCONTINUED | OUTPATIENT
Start: 2023-07-15 | End: 2023-07-20 | Stop reason: HOSPADM

## 2023-07-15 RX ORDER — CLINDAMYCIN PHOSPHATE 900 MG/50ML
900 INJECTION, SOLUTION INTRAVENOUS EVERY 8 HOURS
Status: DISCONTINUED | OUTPATIENT
Start: 2023-07-15 | End: 2023-07-20 | Stop reason: HOSPADM

## 2023-07-15 RX ORDER — HYDROMORPHONE HYDROCHLORIDE 1 MG/ML
.5-1 INJECTION, SOLUTION INTRAMUSCULAR; INTRAVENOUS; SUBCUTANEOUS
Status: DISCONTINUED | OUTPATIENT
Start: 2023-07-15 | End: 2023-07-20 | Stop reason: HOSPADM

## 2023-07-15 RX ORDER — ONDANSETRON 2 MG/ML
4 INJECTION INTRAMUSCULAR; INTRAVENOUS EVERY 6 HOURS PRN
Status: DISCONTINUED | OUTPATIENT
Start: 2023-07-15 | End: 2023-07-20 | Stop reason: HOSPADM

## 2023-07-15 RX ORDER — LIDOCAINE 40 MG/G
CREAM TOPICAL
Status: DISCONTINUED | OUTPATIENT
Start: 2023-07-15 | End: 2023-07-20 | Stop reason: HOSPADM

## 2023-07-15 RX ORDER — PROCHLORPERAZINE MALEATE 10 MG
10 TABLET ORAL EVERY 6 HOURS PRN
Status: DISCONTINUED | OUTPATIENT
Start: 2023-07-15 | End: 2023-07-20 | Stop reason: HOSPADM

## 2023-07-15 RX ORDER — CLINDAMYCIN PHOSPHATE 900 MG/50ML
900 INJECTION, SOLUTION INTRAVENOUS ONCE
Status: COMPLETED | OUTPATIENT
Start: 2023-07-15 | End: 2023-07-15

## 2023-07-15 RX ORDER — LISINOPRIL 10 MG/1
10 TABLET ORAL DAILY
Status: DISCONTINUED | OUTPATIENT
Start: 2023-07-15 | End: 2023-07-16

## 2023-07-15 RX ORDER — ACETAMINOPHEN 500 MG
500-1000 TABLET ORAL 4 TIMES DAILY PRN
Status: DISCONTINUED | OUTPATIENT
Start: 2023-07-15 | End: 2023-07-20 | Stop reason: HOSPADM

## 2023-07-15 RX ORDER — NICOTINE POLACRILEX 4 MG
15-30 LOZENGE BUCCAL
Status: DISCONTINUED | OUTPATIENT
Start: 2023-07-15 | End: 2023-07-20 | Stop reason: HOSPADM

## 2023-07-15 RX ORDER — POLYETHYLENE GLYCOL 3350 17 G
2-4 POWDER IN PACKET (EA) ORAL
Status: DISCONTINUED | OUTPATIENT
Start: 2023-07-15 | End: 2023-07-20 | Stop reason: HOSPADM

## 2023-07-15 RX ORDER — OXYCODONE HYDROCHLORIDE 5 MG/1
5 TABLET ORAL EVERY 4 HOURS PRN
Status: DISCONTINUED | OUTPATIENT
Start: 2023-07-15 | End: 2023-07-15

## 2023-07-15 RX ORDER — LANCETS
EACH MISCELLANEOUS
Qty: 100 EACH | Refills: 6 | Status: SHIPPED | OUTPATIENT
Start: 2023-07-15

## 2023-07-15 RX ORDER — NICOTINE 21 MG/24HR
1 PATCH, TRANSDERMAL 24 HOURS TRANSDERMAL DAILY
Status: DISCONTINUED | OUTPATIENT
Start: 2023-07-15 | End: 2023-07-20 | Stop reason: HOSPADM

## 2023-07-15 RX ORDER — OXYCODONE HYDROCHLORIDE 5 MG/1
5-10 TABLET ORAL EVERY 4 HOURS PRN
Status: DISCONTINUED | OUTPATIENT
Start: 2023-07-15 | End: 2023-07-20 | Stop reason: HOSPADM

## 2023-07-15 RX ORDER — NALOXONE HYDROCHLORIDE 0.4 MG/ML
0.2 INJECTION, SOLUTION INTRAMUSCULAR; INTRAVENOUS; SUBCUTANEOUS
Status: DISCONTINUED | OUTPATIENT
Start: 2023-07-15 | End: 2023-07-20 | Stop reason: HOSPADM

## 2023-07-15 RX ORDER — AMOXICILLIN 250 MG
2 CAPSULE ORAL 2 TIMES DAILY
Status: DISCONTINUED | OUTPATIENT
Start: 2023-07-15 | End: 2023-07-20 | Stop reason: HOSPADM

## 2023-07-15 RX ORDER — ONDANSETRON 4 MG/1
4 TABLET, ORALLY DISINTEGRATING ORAL EVERY 6 HOURS PRN
Status: DISCONTINUED | OUTPATIENT
Start: 2023-07-15 | End: 2023-07-20 | Stop reason: HOSPADM

## 2023-07-15 RX ORDER — ONDANSETRON 2 MG/ML
4 INJECTION INTRAMUSCULAR; INTRAVENOUS ONCE
Status: COMPLETED | OUTPATIENT
Start: 2023-07-15 | End: 2023-07-15

## 2023-07-15 RX ORDER — ACETAMINOPHEN 500 MG
1000 TABLET ORAL EVERY 6 HOURS PRN
Status: DISCONTINUED | OUTPATIENT
Start: 2023-07-15 | End: 2023-07-15

## 2023-07-15 RX ORDER — NALOXONE HYDROCHLORIDE 0.4 MG/ML
0.4 INJECTION, SOLUTION INTRAMUSCULAR; INTRAVENOUS; SUBCUTANEOUS
Status: DISCONTINUED | OUTPATIENT
Start: 2023-07-15 | End: 2023-07-20 | Stop reason: HOSPADM

## 2023-07-15 RX ORDER — PROCHLORPERAZINE 25 MG
25 SUPPOSITORY, RECTAL RECTAL EVERY 12 HOURS PRN
Status: DISCONTINUED | OUTPATIENT
Start: 2023-07-15 | End: 2023-07-20 | Stop reason: HOSPADM

## 2023-07-15 RX ORDER — HYDROMORPHONE HYDROCHLORIDE 1 MG/ML
0.5 INJECTION, SOLUTION INTRAMUSCULAR; INTRAVENOUS; SUBCUTANEOUS ONCE
Status: COMPLETED | OUTPATIENT
Start: 2023-07-15 | End: 2023-07-15

## 2023-07-15 RX ORDER — GLUCOSAMINE HCL/CHONDROITIN SU 500-400 MG
CAPSULE ORAL
Qty: 100 EACH | Refills: 3 | Status: SHIPPED | OUTPATIENT
Start: 2023-07-15

## 2023-07-15 RX ORDER — AMOXICILLIN 250 MG
1 CAPSULE ORAL 2 TIMES DAILY
Status: DISCONTINUED | OUTPATIENT
Start: 2023-07-15 | End: 2023-07-20 | Stop reason: HOSPADM

## 2023-07-15 RX ORDER — LANOLIN ALCOHOL/MO/W.PET/CERES
3 CREAM (GRAM) TOPICAL
Status: DISCONTINUED | OUTPATIENT
Start: 2023-07-15 | End: 2023-07-20 | Stop reason: HOSPADM

## 2023-07-15 RX ORDER — IOPAMIDOL 755 MG/ML
500 INJECTION, SOLUTION INTRAVASCULAR ONCE
Status: COMPLETED | OUTPATIENT
Start: 2023-07-15 | End: 2023-07-15

## 2023-07-15 RX ADMIN — NICOTINE 1 PATCH: 21 PATCH, EXTENDED RELEASE TRANSDERMAL at 11:52

## 2023-07-15 RX ADMIN — ACETAMINOPHEN 1000 MG: 500 TABLET, FILM COATED ORAL at 17:06

## 2023-07-15 RX ADMIN — INSULIN ASPART 3 UNITS: 100 INJECTION, SOLUTION INTRAVENOUS; SUBCUTANEOUS at 17:25

## 2023-07-15 RX ADMIN — PIPERACILLIN SODIUM AND TAZOBACTAM SODIUM 3.38 G: 3; .375 INJECTION, SOLUTION INTRAVENOUS at 22:00

## 2023-07-15 RX ADMIN — LISINOPRIL 10 MG: 10 TABLET ORAL at 13:03

## 2023-07-15 RX ADMIN — OXYCODONE HYDROCHLORIDE 10 MG: 5 TABLET ORAL at 17:06

## 2023-07-15 RX ADMIN — ONDANSETRON 4 MG: 2 INJECTION INTRAMUSCULAR; INTRAVENOUS at 05:53

## 2023-07-15 RX ADMIN — SODIUM CHLORIDE 1000 ML: 9 INJECTION, SOLUTION INTRAVENOUS at 05:55

## 2023-07-15 RX ADMIN — IOPAMIDOL 80 ML: 755 INJECTION, SOLUTION INTRAVENOUS at 07:57

## 2023-07-15 RX ADMIN — VANCOMYCIN HYDROCHLORIDE 1000 MG: 1 INJECTION, SOLUTION INTRAVENOUS at 19:53

## 2023-07-15 RX ADMIN — INSULIN ASPART 4 UNITS: 100 INJECTION, SOLUTION INTRAVENOUS; SUBCUTANEOUS at 22:00

## 2023-07-15 RX ADMIN — PIPERACILLIN SODIUM AND TAZOBACTAM SODIUM 3.38 G: 3; .375 INJECTION, SOLUTION INTRAVENOUS at 17:06

## 2023-07-15 RX ADMIN — HYDROMORPHONE HYDROCHLORIDE 0.5 MG: 1 INJECTION, SOLUTION INTRAMUSCULAR; INTRAVENOUS; SUBCUTANEOUS at 09:43

## 2023-07-15 RX ADMIN — POTASSIUM CHLORIDE AND SODIUM CHLORIDE: 900; 150 INJECTION, SOLUTION INTRAVENOUS at 12:32

## 2023-07-15 RX ADMIN — OXYCODONE HYDROCHLORIDE 10 MG: 5 TABLET ORAL at 21:14

## 2023-07-15 RX ADMIN — CLINDAMYCIN PHOSPHATE 900 MG: 900 INJECTION, SOLUTION INTRAVENOUS at 07:35

## 2023-07-15 RX ADMIN — CLINDAMYCIN PHOSPHATE 900 MG: 900 INJECTION, SOLUTION INTRAVENOUS at 16:46

## 2023-07-15 RX ADMIN — INSULIN ASPART 4 UNITS: 100 INJECTION, SOLUTION INTRAVENOUS; SUBCUTANEOUS at 14:03

## 2023-07-15 RX ADMIN — POTASSIUM CHLORIDE AND SODIUM CHLORIDE: 900; 150 INJECTION, SOLUTION INTRAVENOUS at 21:08

## 2023-07-15 RX ADMIN — HYDROMORPHONE HYDROCHLORIDE 0.5 MG: 1 INJECTION, SOLUTION INTRAMUSCULAR; INTRAVENOUS; SUBCUTANEOUS at 08:16

## 2023-07-15 RX ADMIN — OXYCODONE HYDROCHLORIDE 5 MG: 5 TABLET ORAL at 13:08

## 2023-07-15 RX ADMIN — VANCOMYCIN HYDROCHLORIDE 1500 MG: 10 INJECTION, POWDER, LYOPHILIZED, FOR SOLUTION INTRAVENOUS at 08:10

## 2023-07-15 RX ADMIN — KETOROLAC TROMETHAMINE 15 MG: 15 INJECTION, SOLUTION INTRAMUSCULAR; INTRAVENOUS at 11:52

## 2023-07-15 RX ADMIN — INSULIN ASPART 6 UNITS: 100 INJECTION, SOLUTION INTRAVENOUS; SUBCUTANEOUS at 10:23

## 2023-07-15 RX ADMIN — INSULIN GLARGINE 7 UNITS: 100 INJECTION, SOLUTION SUBCUTANEOUS at 10:23

## 2023-07-15 RX ADMIN — PIPERACILLIN SODIUM AND TAZOBACTAM SODIUM 4.5 G: 4; .5 INJECTION, SOLUTION INTRAVENOUS at 07:34

## 2023-07-15 ASSESSMENT — ACTIVITIES OF DAILY LIVING (ADL)
ADLS_ACUITY_SCORE: 35
ADLS_ACUITY_SCORE: 20
ADLS_ACUITY_SCORE: 20
ADLS_ACUITY_SCORE: 35
ADLS_ACUITY_SCORE: 20
ADLS_ACUITY_SCORE: 35
ADLS_ACUITY_SCORE: 35

## 2023-07-15 NOTE — PHARMACY-ADMISSION MEDICATION HISTORY
Pharmacist Admission Medication History    Admission medication history is complete. The information provided in this note is only as accurate as the sources available at the time of the update.    Medication reconciliation/reorder completed by provider prior to medication history? No    Information Source(s): Patient via in-person    Pertinent Information: Patient states he is not on any medications at home    Changes made to PTA medication list:    Added: None    Deleted: metformin 500 mg ER tablet: 4 tablets every day, lisinopril 10 mg every day     Changed: None    Medication Affordability:  Not including over the counter (OTC) medications, was there a time in the past 3 months when you did not take your medications as prescribed because of cost?: No    Allergies reviewed with patient and updates made in EHR: no    Medication History Completed By: Fina Kebede RPH 7/15/2023 11:00 AM    Prior to Admission medications    Medication Sig Last Dose Taking? Auth Provider Long Term End Date   alcohol swab prep pads Use to swab area of injection/kelechi as directed.   Rell Byrd DO     blood glucose (NO BRAND SPECIFIED) test strip Use to test blood sugar daily or as directed. To accompany: Blood Glucose Monitor Brands: per insurance.   Rell Byrd DO     blood glucose calibration (NO BRAND SPECIFIED) solution To accompany: Blood Glucose Monitor Brands: per insurance.   Rell Byrd DO     blood glucose monitoring (NO BRAND SPECIFIED) meter device kit Use to test blood sugar daily or as directed. Preferred blood glucose meter OR supplies to accompany: Blood Glucose Monitor Brands: per insurance.   Rell Byrd DO Yes    blood glucose monitoring (NO BRAND SPECIFIED) meter device kit Use to test blood sugar 2 times daily or as directed.   Jorge Rubio MD Yes    blood glucose monitoring (NO BRAND SPECIFIED) test strip Use to test blood sugars 2 times daily or as directed   Jorge Rubio MD     Blood  Glucose Monitoring Suppl (ACCU-CHEK COMPACT CARE KIT) KIT Use to test blood sugars 2 times daily or as directed.   Lee Ann Downs MD Yes    lisinopril (ZESTRIL) 10 MG tablet Take 1 tablet (10 mg) by mouth daily   Rell Byrd DO Yes    thin (NO BRAND SPECIFIED) lancets Use with lanceting device. To accompany: Blood Glucose Monitor Brands: per insurance.   Rell Byrd DO

## 2023-07-15 NOTE — CONSULTS
[facility]  Colon and Rectal Surgery Consult Note  Name: Ubaldo Zamora    MRN: [id number]  YOB: 1990    Age: 32 year old  Date of admission: 7/15/2023  Primary care provider: Rell Byrd     Requesting Physician:  Isidoro  Reason for consult:  Perineal cellulitis           History of Present Illness:   Ubaldo Zamora is a 32 year old male, seen at the request of DONAVAN Chaudhry, who presents with several days of perineal pain and drainage (yellow/slightly bloody), along with nausea/vomiting. He denied any prior episodes. He denies prior history of perianal abscess, fistula or fissures. He reports normal daily BM with occasional constipation. Denies personal and family history of IBD. He does reports maternal GM with colon cancer, who  in 50's. Never had a colonoscopy.  In the ED, workup was significant for low grade tachycardia, normal BP. Labs with WBC of 30. Na of 131, Cr 1, Glu in 500s, with HA1C of 14.6. CT A/P was obtained and showed edema in the scrotum and left side of the perineum without evidence of abscess or gas. Urology was consulted and evaluated this patient. Per 's note, exam consistent with scrotal cellulitis vs early Julisa's and to manage with abx/observation for now. CRS evaluation was recommended for possible perirectal abscess. Patient reports that his symptoms has been improving since admission to the hospital.       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)     history of DKA, recurring infections             Past Surgical History:     Past Surgical History:   Procedure Laterality Date     GENITOURINARY SURGERY      scrotum     TONSILLECTOMY, ADENOIDECTOMY, COMBINED                 Social History:     Social History     Tobacco Use     Smoking status: Every Day      "Packs/day: 1.00     Types: Cigarettes     Smokeless tobacco: Never   Substance Use Topics     Alcohol use: No     Alcohol/week: 0.0 standard drinks of alcohol             Family History:     Family History   Problem Relation Age of Onset     Depression Mother      Coronary Artery Disease Mother      Heart Disease Mother      Diabetes Mother      Hypertension Mother      Stomach Problem Mother      Obesity Mother      Depression Father      Hypertension Father      Alcoholism Father      Hyperlipidemia Father      Mental Illness Father      Asthma Father      Depression Maternal Grandmother      Hyperlipidemia Maternal Grandmother      Colon Cancer Maternal Grandmother      Diabetes Maternal Grandmother      Depression Maternal Grandfather      Hyperlipidemia Maternal Grandfather      Coronary Artery Disease Maternal Grandfather      Diabetes Maternal Grandfather      Hyperlipidemia Paternal Grandmother      Coronary Artery Disease Paternal Grandmother      Diabetes Paternal Grandmother      Diabetes Paternal Grandfather              Allergies:   No Known Allergies          Medications:       insulin aspart  1-6 Units Subcutaneous Q4H     insulin glargine  8 Units Subcutaneous Once     lidocaine (PF)         lisinopril  10 mg Oral Daily     nicotine  1 patch Transdermal Daily     nicotine   Transdermal Q8H             Review of Systems:   A comprehensive greater than 10 system review of systems was carried out.  Pertinent positives and negatives are noted above.  Otherwise negative for contributory info.            Physical Exam:     Blood pressure (!) 143/85, pulse 103, temperature 97.2  F (36.2  C), temperature source Temporal, resp. rate 20, height 1.651 m (5' 5\"), weight 72.6 kg (160 lb), SpO2 94 %.  No intake or output data in the 24 hours ending 07/15/23 1255  Exam:  General - Awake alert and oriented, appears stated age, non toxic  Pulm - Non-labored breathing with normal respiratory effort  CVS - reg rate and " rhythm  Abd - soft, non tender  Non distended.  No guarding, rigidity or peritoneal signs.   Rectal exam- a small area of induration on left anterior of perineum, just slightly inferior to the base of the penis with small area of opening with purulent drainage. No collection. Mild tenderness to palpation. Scrotal erythema and tenderness spreading inferiorly to this area.   No perianal fistulas, fissures, or external hemorrhoids. No perianal abscess appreciated  SABAS- normal sphincter tone, no palpable lesions or internal opening. No tenderness.             Data Reviewed:     Recent Results (from the past 24 hour(s))   CT Abdomen Pelvis w Contrast    Narrative    EXAM: CT ABDOMEN AND PELVIS WITH CONTRAST  LOCATION: [facility]  DATE: 07/15/2023    INDICATION: Abdominal pain, vomiting, perineal infection, please extend down to mid thighs to get entire scrotal   perineal region.  COMPARISON: None.  TECHNIQUE: CT scan of the abdomen and pelvis was performed following injection of IV contrast. Multiplanar reformats were obtained. Dose reduction techniques were used.  CONTRAST: 80 mL Isovue 370.    FINDINGS:   LOWER CHEST: Normal.    HEPATOBILIARY: Cholelithiasis. Gas in the gallbladder lumen presumably from prior sphincterotomy.    PANCREAS: Normal.    SPLEEN: Normal.    ADRENAL GLANDS: Normal.    KIDNEYS/BLADDER: Normal.    BOWEL: Normal.    LYMPH NODES: Normal.    VASCULATURE: Unremarkable.    PELVIC ORGANS: There is edema on the left side of the perineum and scrotum. No gas in the soft tissues. No abscess.    MUSCULOSKELETAL: Normal.      Impression    IMPRESSION:   1.  Edema in the scrotum and left side of the perineum without evidence of abscess or gas.  2.  Cholelithiasis.             Recent Labs   Lab 07/15/23  0652   PHV 7.29*   PO2V 19*   PCO2V 54*   HCO3V 26     Recent Labs   Lab 07/15/23  0550   WBC 30.0*   HGB 14.7   HCT 40.8   MCV 83             Lab Results   Component Value Date      07/15/2023     06/01/2022     05/23/2022     05/20/2016     05/15/2016     04/30/2013    Lab Results   Component Value Date    CHLORIDE 92 07/15/2023    CHLORIDE 108 06/01/2022    CHLORIDE 99 05/23/2022    CHLORIDE 107 05/20/2016    CHLORIDE 101 05/15/2016    CHLORIDE 101 04/30/2013    Lab Results   Component Value Date    BUN 13.6 07/15/2023    BUN 15 06/01/2022    BUN 15 05/23/2022    BUN 14 05/20/2016    BUN 15 05/15/2016    BUN 5 04/30/2013      Lab Results   Component Value Date    POTASSIUM 3.9 07/15/2023    POTASSIUM 3.9 06/01/2022    POTASSIUM 3.9 05/23/2022    POTASSIUM 4.4 05/20/2016    POTASSIUM 4.6 05/15/2016    POTASSIUM 3.4 04/30/2013    Lab Results   Component Value Date    CO2 24 07/15/2023    CO2 26 06/01/2022    CO2 26 05/23/2022    CO2 26 05/20/2016    CO2 26 05/15/2016    CO2 24 04/30/2013    Lab Results   Component Value Date    CR 1.01 07/15/2023    CR 0.84 06/01/2022    CR 0.65 05/23/2022    CR 0.66 05/20/2016    CR 0.60 05/15/2016    CR 0.57 04/30/2013            Assessment and Plan:   Ubaldo Zamora is a 32 year old male who presented with perineal/scrotal infection. No evidence of perianal abscess or necrotizing infection at this time. Agree with abx management and if  needs to proceed with surgical intervention, CRS will be available for intraop assistance if needed.     Plan:  1. Admitted to medicine  2. Surgery: no indication for urgent surgery at this time.  3. Diet: ok for diet from CRS standpoint  4. IV Fluids: per prmary  5. Antibiotics:  Agree with broad spectrum abx including clindamycin  6. Medications: control of diabetes  7. I&O s:  strict I&O s   8. Labs:   - Reviewed: Yes   9. Imaging:   - Dr. Vale and myself have personally viewed: CT abd/pelvis  10. Activity:  OOB, ambulate as able  11. DVT prophylaxis: SCD s  12. This plan has been discussed with Dr. Vale    Patient specific identified risk factors considered as part of today s  evaluation include: Poorly controlled diabetes, smoker     Additional history obtained from chart review.  Time spent on consultation: 30 minutes, greater than 50% spent on counseling and/or coordination of care.      Tamra Orta MD  Colon and Rectal Surgery Fellow  [facility]  [address]  [place] [address]  T: [phone]  F: [phone]

## 2023-07-15 NOTE — PLAN OF CARE
AOx4, up ad joleen, febrile at 100.0, PRN tylenol given. PRN oxy given for 8/10 pain in periarea. Tolerating regular diet, ACHS blood sugar check. Pt states that he hasn't been managing DM d/t insurance reasons, SW consult put in. PIV x2, IVF infusing. Denies nausea at this time. Red and tender ata-area, redness marked and seemingly already improving with IV abx.

## 2023-07-15 NOTE — ED TRIAGE NOTES
Pt reports he has been having nausea and vomiting the last 24 hrs. Pt reports sores in his genital area for the past few days. No fevers at home. Pt c/o generalized abd pain. Denies diarrhea

## 2023-07-15 NOTE — PHARMACY-VANCOMYCIN DOSING SERVICE
"Pharmacy Vancomycin Initial Note  Date of Service July 15, 2023  Patient's  1990  32 year old, male    Indication: Skin and Soft Tissue Infection/ Sepsis    Current estimated CrCl = Estimated Creatinine Clearance: 109.8 mL/min (based on SCr of 1.01 mg/dL).    Creatinine for last 3 days  7/15/2023:  5:50 AM Creatinine 1.01 mg/dL    Recent Vancomycin Level(s) for last 3 days  No results found for requested labs within last 3 days.      Vancomycin IV Administrations (past 72 hours)                   vancomycin (VANCOCIN) 1,500 mg in 0.9% NaCl 250 mL intermittent infusion (mg) 1,500 mg New Bag 07/15/23 0810                Nephrotoxins and other renal medications (From now, onward)    Start     Dose/Rate Route Frequency Ordered Stop    07/15/23 2000  vancomycin (VANCOCIN) 1000 mg in dextrose 5% 200 mL PREMIX         1,000 mg  200 mL/hr over 1 Hours Intravenous EVERY 12 HOURS 07/15/23 1630      07/15/23 1525  piperacillin-tazobactam (ZOSYN) intermittent infusion 3.375 g        Note to Pharmacy: For [facility], [facility] and [facility]: For Zosyn-naive patients, use the \"Zosyn initial dose + extended infusion\" order panel.    3.375 g  100 mL/hr over 30 Minutes Intravenous EVERY 6 HOURS 07/15/23 1524      07/15/23 1205  lisinopril (ZESTRIL) tablet 10 mg         10 mg Oral DAILY 07/15/23 1201      07/15/23 1125  ketorolac (TORADOL) injection 15 mg         15 mg Intravenous EVERY 6 HOURS PRN 07/15/23 1125 23 1124          Contrast Orders - past 72 hours (72h ago, onward)    Start     Dose/Rate Route Frequency Stop    07/15/23 0800  iopamidol (ISOVUE-370) solution 500 mL         500 mL Intravenous ONCE 07/15/23 0757          [employer] Prediction of Planned Initial Vancomycin Regimen  Loading dose: 1500 mg IV x1  Regimen: 1000 mg IV every 12 hours.  Start time: 20:10 on 07/15/2023  Exposure target: AUC24 (range)400-600 mg/L.hr   AUC24,ss: 475 mg/L.hr  Probability of AUC24 > 400: 68 %  Ctrough,ss: 14.4 mg/L  Probability of Ctrough,ss " > 20: 25 %  Probability of nephrotoxicity (Lodise ISIAH 2009): 10 %          Plan:  1. Start vancomycin  1000 mg IV q12h.   2. Vancomycin monitoring method: AUC  3. Vancomycin therapeutic monitoring goal: 400-600 mg*h/L  4. Pharmacy will check vancomycin levels as appropriate in 1-3 Days.    5. Serum creatinine levels will be ordered a minimum of twice weekly.      Jame Block [facility]

## 2023-07-15 NOTE — CONSULTS
Urology Consult History and Physical    Name: Ubaldo Zamora    MRN: [id number]   YOB: 1990       We were asked to see Ubaldo Zamora at the request for evaluation and treatment of the following chief complaint:          Chief Complaint:   Perineal infection  History is obtained from patient, chart review          History of Present Illness:   Ubaldo Zamora is a 32 year old male with PMHx poorly controlled T2DM who presents to ED with 24 hours of nausea/vomiting as well as genital sore for the past few days. ED workup revealed blood glucose in the 500s, Na 131, WBC 30, lactate WNL. CT reveals left perineal edema without any gas. Patient was started on vanc, zosyn, clinda. Urology consulted to evaluate need for surgical intervention    Patient reports 24 hours of nausea, vomiting, and chills. 3 days ago he noticed a sore on his perineum that has been draining yellowish fluid and is very tender. He has never had this happen before. Denies any difficulty with urination but endorses frequency          Past Medical History:     Past Medical History:   Diagnosis Date     Diabetes (H)     type 2 diabetes     Diabetes mellitus type 2 with complications (H)     history of DKA, recurring infections            Past Surgical History:     Past Surgical History:   Procedure Laterality Date     GENITOURINARY SURGERY      scrotum     TONSILLECTOMY, ADENOIDECTOMY, COMBINED              Social History:     Social History     Tobacco Use     Smoking status: Every Day     Packs/day: 1.00     Types: Cigarettes     Smokeless tobacco: Never   Substance Use Topics     Alcohol use: No     Alcohol/week: 0.0 standard drinks of alcohol            Family History:     Family History   Problem Relation Age of Onset     Depression Mother      Coronary Artery Disease Mother      Heart Disease Mother      Diabetes Mother      Hypertension Mother      Stomach Problem Mother      Obesity Mother      Depression Father       Hypertension Father      Alcoholism Father      Hyperlipidemia Father      Mental Illness Father      Asthma Father      Depression Maternal Grandmother      Hyperlipidemia Maternal Grandmother      Colon Cancer Maternal Grandmother      Diabetes Maternal Grandmother      Depression Maternal Grandfather      Hyperlipidemia Maternal Grandfather      Coronary Artery Disease Maternal Grandfather      Diabetes Maternal Grandfather      Hyperlipidemia Paternal Grandmother      Coronary Artery Disease Paternal Grandmother      Diabetes Paternal Grandmother      Diabetes Paternal Grandfather             Allergies:   No Known Allergies         Medications:     Current Facility-Administered Medications   Medication     acetaminophen (TYLENOL) tablet 500-1,000 mg     glucose gel 15-30 g    Or     dextrose 50 % injection 25-50 mL    Or     glucagon injection 1 mg     insulin aspart (NovoLOG) injection (RAPID ACTING)     insulin glargine (LANTUS PEN) injection 7 Units     Current Outpatient Medications   Medication Sig     alcohol swab prep pads Use to swab area of injection/kelechi as directed.     blood glucose (NO BRAND SPECIFIED) test strip Use to test blood sugar daily or as directed. To accompany: Blood Glucose Monitor Brands: per insurance.     blood glucose calibration (NO BRAND SPECIFIED) solution To accompany: Blood Glucose Monitor Brands: per insurance.     blood glucose monitoring (NO BRAND SPECIFIED) meter device kit Use to test blood sugar daily or as directed. Preferred blood glucose meter OR supplies to accompany: Blood Glucose Monitor Brands: per insurance.     blood glucose monitoring (NO BRAND SPECIFIED) meter device kit Use to test blood sugar 2 times daily or as directed.     blood glucose monitoring (NO BRAND SPECIFIED) test strip Use to test blood sugars 2 times daily or as directed     Blood Glucose Monitoring Suppl (ACCU-CHEK COMPACT CARE KIT) KIT Use to test blood sugars 2 times daily or as directed.      lisinopril (ZESTRIL) 10 MG tablet Take 1 tablet (10 mg) by mouth daily     metFORMIN (GLUCOPHAGE XR) 500 MG 24 hr tablet Take 2 tablets (1,000 mg) by mouth daily (with dinner) for 7 days, THEN 4 tablets (2,000 mg) daily (with dinner) for 21 days.     metFORMIN (GLUCOPHAGE XR) 500 MG 24 hr tablet Take 4 tablets (2,000 mg) by mouth daily (with dinner)     thin (NO BRAND SPECIFIED) lancets Use with lanceting device. To accompany: Blood Glucose Monitor Brands: per insurance.             Review of Systems:    ROS: See HPI for pertinent details.  Remainder of 10-point ROS negative.       Physical Exam:   VS:  T: 97.2    HR: 103    BP: 143/85    RR: 20   GEN:  AOx3.  NAD.  Pleasant.  HEENT:  Sclerae anicteric.  Conjunctivae pink.  Moist mucous membranes  NECK:  Supple.  No lymphadenopathy.  CV:  RRR  LUNGS: Non-labored breathing.  BACK:  No costoverterbral tenderness.  ABD:  Soft.  NT.  ND.  No rebound or guarding.  No surgical scars. No masses.    :  See picture in media tab. Diffuse scrotal erythema; at posterior perineum there is small scab (vs possible early eschar) and abraded skin that is draining scant amount whitish yellow fluid. Attempted to probe opening but it did not appear to track. Induration without crepitus or fluctuance  EXT:  Warm, well perfused.    SKIN:  Warm.  Dry.  No rashes.  NEURO:  CN grossly intact.             Data:   All laboratory data reviewed:    Recent Labs   Lab 07/15/23  0550   WBC 30.0*   HGB 14.7        Recent Labs   Lab 07/15/23  0749 07/15/23  0550   NA  --  131*   POTASSIUM  --  3.9   CHLORIDE  --  92*   CO2  --  24   BUN  --  13.6   CR  --  1.01   * 533*   JACKIE  --  9.7     Recent Labs   Lab 07/15/23  0651   COLOR Light Yellow   APPEARANCE Clear   URINEGLC >=1000*   URINEBILI Negative   URINEKETONE 20*   SG 1.034   URINEPH 5.5   PROTEIN 30*   NITRITE Negative   LEUKEST Negative   RBCU 22*   WBCU <1     No results found for this or any previous visit.    All  "pertinent imaging reviewed:  Bladder distended; upper tracts WNL; no gas in perineum but edema in left scrotum  \"IMPRESSION:   1.  Edema in the scrotum and left side of the perineum without evidence of abscess or gas.  2.  Cholelithiasis.\"         Impression and Plan:   Impression / Plan:   Ubaldo Zamora is a 32 year old male with PMHx uncontrolled diabetes who presents with concern for perineal infection consistent with scrotal cellulitis vs early Julisa's. Exam reveals small scab vs early eschar at posterior perineum, ~2 cm from anus. Exam and imaging do not reveal a definite fluid collection, therefore recommend observation on antibiotics for now. However given location would recommend colorectal evaluation for possible perirectal abscess.     Bladder is very distended on CT, concern for neurogenic bladder based on poorly controlled sugars. Recommend monitoring PVRs, shrestha or CIC if volume >150    - continue broad spectrum antibiotics (vanc/zosyn clinda)  - wound probed and fluid sent for culture, follow results  - ok for CLD pending colorectal eval, please make patient NPO at midnight in case of operative intervention tomorrow  - will continue to monitor exam and clinical status. At this point with no identifiable drainable collection, will plan to manage conservatively. However if clinically worsening or changing exam, low threshold to proceed to OR for exploration  - please contact urology on call if there is a change in clinical status (fevers, hemodynamic instability, etc.)        Urology will follow along.     Discussed with Dr. Ayala    Thank you for the opportunity to participate in the care of Ubaldo Zamora.     Niki Tuttle MD  [id number]  [id number]         "

## 2023-07-15 NOTE — ED PROVIDER NOTES
History     Chief Complaint:  Nausea & Vomiting       HPI   Ubaldo Zamora is a 32 year old male presenting to the emergency department for evaluation of nausea and vomiting for the past 24 hours.  Patient reports 3 episodes of nonbloody emesis over the course of the past 24 hours.  For the past 3 days, he is also noted pain and discomfort in the perineal region, including development of sores and some spontaneous drainage.  He denies ever experiencing symptoms similar to this in the past.  He reports a prior history of diabetes, though is not on any medications for this.  He does not check his blood sugars regularly.  He uses tobacco, denies illicit drug use or alcohol use.  He notes abdominal pain to the epigastric region.  He denies any fevers nor chills.      Independent Historian:   Girlfriend present at bedside corroborates above history.    Review of External Notes:   Office visit note reviewed from 6/1/2022 or alert patient is being followed for diabetes mellitus, and hypertension      Medications:    alcohol swab prep pads  blood glucose (NO BRAND SPECIFIED) test strip  blood glucose calibration (NO BRAND SPECIFIED) solution  blood glucose monitoring (NO BRAND SPECIFIED) meter device kit  blood glucose monitoring (NO BRAND SPECIFIED) meter device kit  blood glucose monitoring (NO BRAND SPECIFIED) test strip  Blood Glucose Monitoring Suppl (ACCU-CHEK COMPACT CARE KIT) KIT  lisinopril (ZESTRIL) 10 MG tablet  metFORMIN (GLUCOPHAGE XR) 500 MG 24 hr tablet  metFORMIN (GLUCOPHAGE XR) 500 MG 24 hr tablet  thin (NO BRAND SPECIFIED) lancets        Past Medical History:    Past Medical History:   Diagnosis Date     Diabetes (H)      Diabetes mellitus type 2 with complications (H)        Past Surgical History:    Past Surgical History:   Procedure Laterality Date     GENITOURINARY SURGERY      scrotum     TONSILLECTOMY, ADENOIDECTOMY, COMBINED          Physical Exam     Patient Vitals for the past 24 hrs:   BP Temp  "Temp src Pulse Resp SpO2 Height Weight   07/15/23 0915 (!) 143/85 -- -- 103 -- 94 % -- --   07/15/23 0845 (!) 148/93 -- -- 105 -- 91 % -- --   07/15/23 0830 (!) 157/92 -- -- 101 -- 96 % -- --   07/15/23 0815 (!) 154/96 -- -- 107 -- 97 % -- --   07/15/23 0745 (!) 153/95 -- -- 107 -- 96 % -- --   07/15/23 0730 (!) 146/91 -- -- 105 -- 96 % -- --   07/15/23 0645 (!) 147/87 -- -- -- -- -- -- --   07/15/23 0548 (!) 147/95 97.2  F (36.2  C) Temporal 114 20 98 % 1.651 m (5' 5\") 72.6 kg (160 lb)        Physical Exam  General:   Well-nourished   Speaking in full sentences  Eyes:   Conjunctiva without injection or scleral icterus  ENT:   Moist mucous membranes   Nares patent   Pinnae normal  Neck:   Full ROM   No stiffness appreciated  Resp:   Lungs CTAB   No crackles, wheezing or audible rubs   Good air movement  CV:    Normal rate, regular rhythm   S1 and S2 present   No murmur, gallop or rub  GI:   BS present   Mild epigastric tenderness   No guarding or rebound tenderness  :   With ERT present in room   Erythema noted to perineal region, inferior scrotum and drainage wound along left perineal region near anal verge   No crepitance  Skin:   Warm, dry, well perfused   No rashes or open wounds on exposed skin  MSK:   Moves all extremities   No focal deformities or swelling  Neuro:   Alert   Answers questions appropriately   Moves all extremities equally   Gait stable  Psych:   Normal affect, normal mood        Emergency Department Course     Imaging:  CT Abdomen Pelvis w Contrast   Final Result   IMPRESSION:    1.  Edema in the scrotum and left side of the perineum without evidence of abscess or gas.   2.  Cholelithiasis.               Report per radiology    Laboratory:  Labs Ordered and Resulted from Time of ED Arrival to Time of ED Departure   COMPREHENSIVE METABOLIC PANEL - Abnormal       Result Value    Sodium 131 (*)     Potassium 3.9      Chloride 92 (*)     Carbon Dioxide (CO2) 24      Anion Gap 15      Urea " Nitrogen 13.6      Creatinine 1.01      Calcium 9.7      Glucose 533 (*)     Alkaline Phosphatase 219 (*)     AST 12      ALT 12      Protein Total 7.6      Albumin 4.3      Bilirubin Total 0.7      GFR Estimate >90     ROUTINE UA WITH MICROSCOPIC REFLEX TO CULTURE - Abnormal    Color Urine Light Yellow      Appearance Urine Clear      Glucose Urine >=1000 (*)     Bilirubin Urine Negative      Ketones Urine 20 (*)     Specific Gravity Urine 1.034      Blood Urine Large (*)     pH Urine 5.5      Protein Albumin Urine 30 (*)     Urobilinogen Urine Normal      Nitrite Urine Negative      Leukocyte Esterase Urine Negative      Mucus Urine Present (*)     RBC Urine 22 (*)     WBC Urine <1      Squamous Epithelials Urine <1     CBC WITH PLATELETS AND DIFFERENTIAL - Abnormal    WBC Count 30.0 (*)     RBC Count 4.89      Hemoglobin 14.7      Hematocrit 40.8      MCV 83      MCH 30.1      MCHC 36.0      RDW 11.9      Platelet Count 240      % Neutrophils 85      % Lymphocytes 5      % Monocytes 9      % Eosinophils 0      % Basophils 0      % Immature Granulocytes 1      NRBCs per 100 WBC 0      Absolute Neutrophils 25.2 (*)     Absolute Lymphocytes 1.6      Absolute Monocytes 2.7 (*)     Absolute Eosinophils 0.1      Absolute Basophils 0.1      Absolute Immature Granulocytes 0.3      Absolute NRBCs 0.0     KETONE BETA-HYDROXYBUTYRATE QUANTITATIVE, RAPID - Abnormal    Ketone (Beta-Hydroxybutyrate) Quantitative 1.60 (*)    BLOOD GAS VENOUS - Abnormal    pH Venous 7.29 (*)     pCO2 Venous 54 (*)     pO2 Venous 19 (*)     Bicarbonate Venous 26      Base Excess/Deficit (+/-) -1.5      FIO2 30     GLUCOSE BY METER - Abnormal    GLUCOSE BY METER POCT 481 (*)    HEMOGLOBIN A1C - Abnormal    Hemoglobin A1C 14.6 (*)    GLUCOSE BY METER - Abnormal    GLUCOSE BY METER POCT 438 (*)    LIPASE - Normal    Lipase 19     LACTIC ACID WHOLE BLOOD - Normal    Lactic Acid 1.8     GLUCOSE MONITOR NURSING POCT   GLUCOSE MONITOR NURSING POCT    BLOOD CULTURE   BLOOD CULTURE        Procedures   None    Emergency Department Course & Assessments:             Interventions:  Medications   glucose gel 15-30 g (has no administration in time range)     Or   dextrose 50 % injection 25-50 mL (has no administration in time range)     Or   glucagon injection 1 mg (has no administration in time range)   insulin aspart (NovoLOG) injection (RAPID ACTING) (6 Units Subcutaneous $Given 7/15/23 1023)   acetaminophen (TYLENOL) tablet 500-1,000 mg (has no administration in time range)   ondansetron (ZOFRAN) injection 4 mg (4 mg Intravenous $Given 7/15/23 0553)   0.9% sodium chloride BOLUS (0 mLs Intravenous Stopped 7/15/23 0648)   piperacillin-tazobactam (ZOSYN) intermittent infusion 4.5 g (0 g Intravenous Stopped 7/15/23 0809)   clindamycin (CLEOCIN) 900 mg in 50 mL NS intermittent infusion (900 mg Intravenous $Given 7/15/23 0735)   vancomycin (VANCOCIN) 1,500 mg in 0.9% NaCl 250 mL intermittent infusion (0 mg Intravenous Stopped 7/15/23 1002)   sodium chloride (PF) 0.9% PF flush 100 mL (60 mLs Intravenous $Given 7/15/23 0757)   iopamidol (ISOVUE-370) solution 500 mL (80 mLs Intravenous $Given 7/15/23 0757)   HYDROmorphone (PF) (DILAUDID) injection 0.5 mg (0.5 mg Intravenous $Given 7/15/23 0816)   insulin glargine (LANTUS PEN) injection 7 Units (7 Units Subcutaneous $Given 7/15/23 1023)   HYDROmorphone (PF) (DILAUDID) injection 0.5 mg (0.5 mg Intravenous $Given 7/15/23 0943)        Assessments:  See below    Independent Interpretation (X-rays, CTs, rhythm strip):  Reviewed CT scan    Consultations/Discussion of Management or Tests:   ED Course as of 07/15/23 1057   Sat Jul 15, 2023   0833 Patient re-evaluated.  Results of imaging and labs discussed   0902 Spoke with Urology.  They will see and evaluate patient.     0905 Spoke with Dr. Mojica from general surgery regarding findings of gas in gallbladder.  He felt this could be related to vomiting, though without pain, would  continue to monitor and unlikely to contribute to presenting symptoms.   0938 Spoke with Yuki Chaudhry PA-C of the hospitalist service who has accepted for Dr. Lagunas       Social Determinants of Health affecting care:   Healthcare Access/Compliance    Disposition:  The patient was admitted to the hospital under the care of Dr. Lagunas.     Impression & Plan      Medical Decision Making:  Ubaldo Zamora is a 32-year-old male presenting to the ED for evaluation of nausea and vomiting as well as perineal pain.  VS on presentation reveal elevated BP and tachycardia, which improved during ED course.  Examination is concerning for perineal cellulitis and wound.  This raises the possibility of Julisa's gangrene prompting further work-up.  Laboratory studies demonstrate marked leukocytosis with a WBC count of 30 K and left shift.  Lactate presently normal.  Blood cultures x2 obtained and are presently pending.  Patient started empirically on broad-spectrum antibiotics including vancomycin, Zosyn, and clindamycin.  Advanced imaging was pursued, demonstrating inflammatory changes to the left perineum and scrotum, though without evidence of drainable abscess or soft tissue gas.  I discussed the case and examination findings with urology, who have graciously seen and evaluated patient at bedside.  Wound culture obtained by urology at bedside, though at this point, are recommending continuation of aggressive antibiotic regimen and do not anticipate emergent surgical intervention at this time. Patient also was found to be markedly hyperglycemic.  He has a diagnosis of diabetes, though is noncompliant with medications.  His blood sugar was initially elevated greater than 500.  He was provided IV fluids, with improvements to 481.  He does not have evidence of concurrent elevated anion gap metabolic acidosis that would raise concern for DKA.  Patient will require hospital admission for further monitoring and management of his  infection.  Of note, he is found to have cholelithiasis as well as an incidental foci of gas within the gallbladder lumen.  There are no other findings to suggest acute cholecystitis, and he has no tenderness to this area.  In discussion with general surgery, this is likely incidental, and may be related to forceful vomiting, particularly in the absence of previous surgical intervention or sphincterotomy.  Case discussed with hospitalist service who has accepted patient for admission.  They have seen and evaluated patient in the ED.  Patient and significant other updated and in agreement with plan of care.  Questions answered prior to admission.      Diagnosis:    ICD-10-CM    1. Cellulitis of perineum  L03.315       2. Hyperglycemia  R73.9       3. Poorly controlled diabetes mellitus (H)  E11.65       4. Calculus of gallbladder without cholecystitis without obstruction  K80.20          7/15/2023   Yony Gallardo MD Roach, Brian Donald, MD  07/15/23 1125

## 2023-07-15 NOTE — ED NOTES
[facility]  ED Nurse Handoff Report    ED Chief complaint: Nausea & Vomiting  . ED Diagnosis:   Final diagnoses:   Cellulitis of perineum   Hyperglycemia   Poorly controlled diabetes mellitus (H)   Calculus of gallbladder without cholecystitis without obstruction       Allergies: No Known Allergies    Code Status: Full Code    Activity level - Baseline/Home:  independent.  Activity Level - Current:   independent.   Lift room needed: No.   Bariatric: No   Needed: No   Isolation: No.   Infection: Not Applicable.     Respiratory status: Room air    Vital Signs (within 30 minutes):   Vitals:    07/15/23 0815 07/15/23 0830 07/15/23 0845 07/15/23 0915   BP: (!) 154/96 (!) 157/92 (!) 148/93 (!) 143/85   Pulse: 107 101 105 103   Resp:       Temp:       TempSrc:       SpO2: 97% 96% 91% 94%   Weight:       Height:           Cardiac Rhythm:  ,      Pain level:    Patient confused: No.   Patient Falls Risk: nonskid shoes/slippers when out of bed and patient and family education.   Elimination Status: Has voided     Patient Report - Initial Complaint: Nausea/vomiting, perineum pain  Focused Assessment:  Ubaldo Zamora is a 32 year old male presenting to the emergency department for evaluation of nausea and vomiting for the past 24 hours.  Patient reports 3 episodes of nonbloody emesis over the course of the past 24 hours.  For the past 3 days, he is also noted pain and discomfort in the perineal region, including development of sores and some spontaneous drainage.  He denies ever experiencing symptoms similar to this in the past.  He reports a prior history of diabetes, though is not on any medications for this.  He does not check his blood sugars regularly.  He uses tobacco, denies illicit drug use or alcohol use.  He notes abdominal pain to the epigastric region.  He denies any fevers nor chills.       Abnormal Results:   Labs Ordered and Resulted from Time of ED Arrival to Time of ED  Departure   COMPREHENSIVE METABOLIC PANEL - Abnormal       Result Value    Sodium 131 (*)     Potassium 3.9      Chloride 92 (*)     Carbon Dioxide (CO2) 24      Anion Gap 15      Urea Nitrogen 13.6      Creatinine 1.01      Calcium 9.7      Glucose 533 (*)     Alkaline Phosphatase 219 (*)     AST 12      ALT 12      Protein Total 7.6      Albumin 4.3      Bilirubin Total 0.7      GFR Estimate >90     ROUTINE UA WITH MICROSCOPIC REFLEX TO CULTURE - Abnormal    Color Urine Light Yellow      Appearance Urine Clear      Glucose Urine >=1000 (*)     Bilirubin Urine Negative      Ketones Urine 20 (*)     Specific Gravity Urine 1.034      Blood Urine Large (*)     pH Urine 5.5      Protein Albumin Urine 30 (*)     Urobilinogen Urine Normal      Nitrite Urine Negative      Leukocyte Esterase Urine Negative      Mucus Urine Present (*)     RBC Urine 22 (*)     WBC Urine <1      Squamous Epithelials Urine <1     CBC WITH PLATELETS AND DIFFERENTIAL - Abnormal    WBC Count 30.0 (*)     RBC Count 4.89      Hemoglobin 14.7      Hematocrit 40.8      MCV 83      MCH 30.1      MCHC 36.0      RDW 11.9      Platelet Count 240      % Neutrophils 85      % Lymphocytes 5      % Monocytes 9      % Eosinophils 0      % Basophils 0      % Immature Granulocytes 1      NRBCs per 100 WBC 0      Absolute Neutrophils 25.2 (*)     Absolute Lymphocytes 1.6      Absolute Monocytes 2.7 (*)     Absolute Eosinophils 0.1      Absolute Basophils 0.1      Absolute Immature Granulocytes 0.3      Absolute NRBCs 0.0     KETONE BETA-HYDROXYBUTYRATE QUANTITATIVE, RAPID - Abnormal    Ketone (Beta-Hydroxybutyrate) Quantitative 1.60 (*)    BLOOD GAS VENOUS - Abnormal    pH Venous 7.29 (*)     pCO2 Venous 54 (*)     pO2 Venous 19 (*)     Bicarbonate Venous 26      Base Excess/Deficit (+/-) -1.5      FIO2 30     GLUCOSE BY METER - Abnormal    GLUCOSE BY METER POCT 481 (*)    GLUCOSE BY METER - Abnormal    GLUCOSE BY METER POCT 438 (*)    LIPASE - Normal    Lipase  19     LACTIC ACID WHOLE BLOOD - Normal    Lactic Acid 1.8     HEMOGLOBIN A1C   GLUCOSE MONITOR NURSING POCT   GLUCOSE MONITOR NURSING POCT   BLOOD CULTURE   BLOOD CULTURE        CT Abdomen Pelvis w Contrast   Final Result   IMPRESSION:    1.  Edema in the scrotum and left side of the perineum without evidence of abscess or gas.   2.  Cholelithiasis.                Treatments provided: See MAR  Family Comments: Significant other at bedside  OBS brochure/video discussed/provided to patient:  No  ED Medications:   Medications   glucose gel 15-30 g (has no administration in time range)     Or   dextrose 50 % injection 25-50 mL (has no administration in time range)     Or   glucagon injection 1 mg (has no administration in time range)   insulin aspart (NovoLOG) injection (RAPID ACTING) (6 Units Subcutaneous $Given 7/15/23 1023)   acetaminophen (TYLENOL) tablet 500-1,000 mg (has no administration in time range)   ondansetron (ZOFRAN) injection 4 mg (4 mg Intravenous $Given 7/15/23 0553)   0.9% sodium chloride BOLUS (0 mLs Intravenous Stopped 7/15/23 0648)   piperacillin-tazobactam (ZOSYN) intermittent infusion 4.5 g (0 g Intravenous Stopped 7/15/23 0809)   clindamycin (CLEOCIN) 900 mg in 50 mL NS intermittent infusion (900 mg Intravenous $Given 7/15/23 0735)   vancomycin (VANCOCIN) 1,500 mg in 0.9% NaCl 250 mL intermittent infusion (0 mg Intravenous Stopped 7/15/23 1002)   sodium chloride (PF) 0.9% PF flush 100 mL (60 mLs Intravenous $Given 7/15/23 0757)   iopamidol (ISOVUE-370) solution 500 mL (80 mLs Intravenous $Given 7/15/23 0757)   HYDROmorphone (PF) (DILAUDID) injection 0.5 mg (0.5 mg Intravenous $Given 7/15/23 0816)   insulin glargine (LANTUS PEN) injection 7 Units (7 Units Subcutaneous $Given 7/15/23 1023)   HYDROmorphone (PF) (DILAUDID) injection 0.5 mg (0.5 mg Intravenous $Given 7/15/23 0943)       Drips infusing:  No  For the majority of the shift this patient was Green.   Interventions performed were  N/A.    Sepsis treatment initiated: No    Cares/treatment/interventions/medications to be completed following ED care: BG management, pain management    ED Nurse Name: Aracelis Alvarez RN  10:29 AM     RECEIVING UNIT ED HANDOFF REVIEW    Above ED Nurse Handoff Report was reviewed: Yes  Reviewed by: Danica Mercado RN on July 15, 2023 at 4:02 PM

## 2023-07-15 NOTE — LETTER
[facility]  [address]  [facility] [address]  Phone: [phone]  Fax: [phone]    July 20, 2023        Ubaldo Zamora  [address] [facility] [address]          To whom it may concern:    RE: Ubaldo Zamora    Mr. Zamora was hospitalized under my care at [facility] 7/15/23-7/20/23.  Please excuse him from work through 7/27/23.  It is anticipated he will be able to return to work on 7/28/23 without restrictions.          Sincerely,  Douglas Mccoy MD  Hospitalist

## 2023-07-15 NOTE — PLAN OF CARE
Arrived to room 603 from ED at 1625 via cart, transferred to bed via SBA, alert and oriented x 4, oriented to room and call system, rates pain 7/10. Safety check complete and VS taken.

## 2023-07-15 NOTE — LETTER
Communication to Referring Provider                    Ubaldo Zamora MRN# [id number]   YOB: 1990 Age: 32 year old     Date of Admission:  ***  Date of Discharge:  {DISCHARGE DATE:119492}  Admitting Physician:  Rodolfo Lagunas DO  Discharging Physician: {:[id number]} (Contact: ***)  Discharging Service:  {:[id number]}  Hospitalization Status: {:[id number]}     Primary Care Clinic:  {:[id number]}  Primary Care Provider: Rell Byrd     {   Salutation            :[id number]}            You have been identified as the Primary Care Provider for Ubaldo Zamora, who was recently admitted to {[facility]:[id number]}.  Thank you for the referral to our hospital.  It is our goal to provide the highest quality of care for our patients, including planning for seamless continuity of care by providing you with timely, accurate and concise information.  After reviewing the following combined discharge summary and final progress note, please contact us if you have any remaining questions.  The Discharging Physician will be the best informed, with their contact information listed above.  If unable to reach them, or if you have received this letter in error, please call *** and someone will try to help you.

## 2023-07-15 NOTE — H&P
[facility]    History and Physical - Hospitalist Service       Date of Admission:  7/15/2023    Assessment & Plan      Ubaldo Zamora is a 32 year old male with long-standing poorly controlled DM2 and history of DKA and dental infections who presented to [facility] with several days perineal pain radiating to the scrotum and down the inner thighs with associate N/V.  He is found to have a draining perineal cellulitis with sepsis (WBC 30, ANC 25.2) and non-acidotic hyperglycemia with ketosis.  CT Abd/Pelv showed perineal swelling without clear abscess or gas formation in addition to some gas noted in the gallbladder.  Received 1L NS.  Empirically started on Clindamycin, Zosyn, and Vanc.  Urology consulted; will see.  Hospitalists admitting to inpatient.      Perineal cellulitis with sepsis  Meets sepsis criteria based on WBC 30, ANC 25.2.  While patient noted to have a small draining area, CT Abd/Pelv notable for edema but negative for abscess or gas.  Ambulating but having a difficult time sitting or laying.  No recent trauma including poorly fitting clothing (wears boxers).  No history of perineal infections.  -- Urology to see; keep n.p.o. until decision made on if patient needs OR today for expression or debridement  -- Continue empiric clindamycin, Zosyn, vancomycin  -- Pain control  -- Pericares  -- CRS consult at request of Urology due to proximity of infection to rectum      Longstanding poorly controlled DM2 complicated by recurring infections and medication noncompliance  HgbA1c 14.  BG in the 200s at recent PCP visit but now > 500.  While patient does have a mild ketosis, does not fit the diagnosis of DKA given a lack of acidosis.  Largely asymptomatic.  Diagnosed a very early age but is felt to be type 2.  During teenage years, which was followed by pediatric endocrinology.  Has only ever been on metformin which has been largely ineffective.  C-peptide and microalbumin urine 6/2015  normal.  HgbA1c was 9-10 since at least 2012.    -- Patient has largely declared himself as necessitating insulin management  -- Started with Lantus 7 and will add an additional 8 units now that A1c is back for a total of 15 units Lantus today.  Plan for Lantus 20u tomorrow AM.  -- NPO ISS pending decision on OR status.  Change to PO mod ISS if not going to OR.   -- Defer insulin drip at this time as patient is largely asymptomatic and does not require an IMC bed.  Could consider starting an insulin drip if his sugars start to climb or he develops signs or symptoms concerning for DKA.  -- Add on osmolality  -- Does not have a glucometer at home.  Glucometer with associated test strips, lancets, alcohol swabs, and sharps container ordered in the discharge navigator.  Additionally, endocrinology and diabetes educator referrals also placed in the discharge navigator.  -- Had a long discussion with patient regarding his diabetes.  He has a very strong genetic predisposition and watched his mother suffer through multiple heart attacks and hemodialysis as a result.  He needs continued education and support.  Unmotivated to pursue aggressive blood sugar control in some part due to minimal response from prior treatments.     HTN?   BP elevated.  Has been on lisinopril in the past but uncertain if this was for HTN or for nephroprotection in setting of DM2.  May be elevated today 2/2 pain.   -- Resume lisinopril 10 mg   -- Recheck BMP in AM     Tobacco dependence  Smokes ~1ppd.  Agreeable to nicotine replacement  -- Start 21 mg Nicotine patch  -- PRN nicotine lozenge 2-4 mg PRN acute craving  -- Tobacco cessation strongly encouraged, in part because of his underlying DM and predisposition to ASCVD (mother had early cardiac disease)       Diet: NPO for Medical/Clinical Reasons Except for: Meds    DVT Prophylaxis: Ambulate  Boss Catheter: Not present  Lines: None     Cardiac Monitoring: None  Code Status:   FULL    Clinically  "Significant Risk Factors Present on Admission                  # Hypertension: Home medication list includes antihypertensive(s)      # Overweight: Estimated body mass index is 26.63 kg/m  as calculated from the following:    Height as of this encounter: 1.651 m (5' 5\").    Weight as of this encounter: 72.6 kg (160 lb).            Disposition Plan      Expected Discharge Date: 07/17/2023                The patient's care was discussed with the Attending Physician, Dr. Lagunas, Patient, Patient's Family and ED MD Team.    uYki Chaudhry PA-C  Hospitalist Service  [facility]  Securely message with [employer] (more info)  Text page via [facility] Paging/Directory     ______________________________________________________________________    Chief Complaint   perineal pain    History is obtained from the patient    History of Present Illness   Ubaldo Zamora is a 32 year old male with long-standing poorly controlled DM2 and history of DKA and dental infections who presented to [facility] with several days perineal pain radiating to the scrotum and down the inner thighs with associate N/V.  Symptoms started rather insidiously.  He first noticed symptoms when he sat in a chair and had significant pain when sitting.  He has some pain when ambulating but pain is worse when he is putting pressure on the area.  No recent trauma.  He wears boxer shorts and does not have any reports of irritation from clothing.  Due to increasing pain, he came in to [facility] ED for evaluation.    In the ED, /95, , RR 20, SPO2 98% at rest on room air, temperature 97.2.  CT Abd/Pelv showed perineal swelling without clear abscess or gas formation in addition to some gas noted in the gallbladder.  Received 1L NS.  Empirically started on Clindamycin, Zosyn, and Vanc.  Urology consulted; will see.  General surgery curb sided and suspect gas in the gallbladder is related to nausea and vomiting patient is largely asymptomatic from an " abdominal standpoint; no RUQ pain. Hospitalists admitting to inpatient.     Denies fevers, body aches, chills, or focal joint swelling/redness/pain.  He has known diabetes mellitus that is uncontrolled.  He has had it for many years and has lost karina in inability to manage his blood sugars.  Has only been on metformin in the past.  Does not check his blood sugars and does not have a glucometer at home.  He smokes 1 pack/day and does not drink alcohol.  Has known severe periodontal disease with need for additional dental extractions but this cannot be performed until his blood sugars are under better control.    Past Medical History    Past Medical History:   Diagnosis Date     Diabetes (H)     type 2 diabetes     Diabetes mellitus type 2 with complications (H)     history of DKA, recurring infections   Long-standing history of DM2.  Diagnosed a very early age but is felt to be type 2.  During teenage years, which was followed by pediatric endocrinology.  Has only ever been on metformin which has been largely ineffective.  C-peptide and microalbumin urine 6/2015 normal.  HgbA1c was 9-10 since at least 2012. No longer using glucometer or metformin because ineffective.    Periodontal disease with multiple dental infections.  Cannot have remainder of teeth removed until BG under better control.     Past Surgical History   Past Surgical History:   Procedure Laterality Date     GENITOURINARY SURGERY      scrotum     TONSILLECTOMY, ADENOIDECTOMY, COMBINED     Dental extractions    Prior to Admission Medications   Prior to Admission Medications   Prescriptions Last Dose Informant Patient Reported? Taking?   Blood Glucose Monitoring Suppl (ACCU-CHEK COMPACT CARE KIT) KIT   No No   Sig: Use to test blood sugars 2 times daily or as directed.   alcohol swab prep pads   No No   Sig: Use to swab area of injection/kelechi as directed.   blood glucose (NO BRAND SPECIFIED) test strip   No No   Sig: Use to test blood sugar daily or as  directed. To accompany: Blood Glucose Monitor Brands: per insurance.   blood glucose calibration (NO BRAND SPECIFIED) solution   No No   Sig: To accompany: Blood Glucose Monitor Brands: per insurance.   blood glucose monitoring (NO BRAND SPECIFIED) meter device kit   No No   Sig: Use to test blood sugar 2 times daily or as directed.   blood glucose monitoring (NO BRAND SPECIFIED) meter device kit   No No   Sig: Use to test blood sugar daily or as directed. Preferred blood glucose meter OR supplies to accompany: Blood Glucose Monitor Brands: per insurance.   blood glucose monitoring (NO BRAND SPECIFIED) test strip   No No   Sig: Use to test blood sugars 2 times daily or as directed   lisinopril (ZESTRIL) 10 MG tablet   No No   Sig: Take 1 tablet (10 mg) by mouth daily   metFORMIN (GLUCOPHAGE XR) 500 MG 24 hr tablet   No No   Sig: Take 4 tablets (2,000 mg) by mouth daily (with dinner)   metFORMIN (GLUCOPHAGE XR) 500 MG 24 hr tablet   No No   Sig: Take 2 tablets (1,000 mg) by mouth daily (with dinner) for 7 days, THEN 4 tablets (2,000 mg) daily (with dinner) for 21 days.   thin (NO BRAND SPECIFIED) lancets   No No   Sig: Use with lanceting device. To accompany: Blood Glucose Monitor Brands: per insurance.      Facility-Administered Medications: None        Review of Systems    A comprehensive 14 point review of systems was undertaken with pertinent positives and negatives as above and otherwise unremarkable.       Social History   I have reviewed this patient's social history and updated it with pertinent information if needed.  Lives with his friend.  He smokes 1 pack/day.  Does not drink alcohol.  Works as a  at [employer].  Social History     Tobacco Use     Smoking status: Every Day     Packs/day: 1.00     Types: Cigarettes     Smokeless tobacco: Never   Vaping Use     Vaping Use: Former   Substance Use Topics     Alcohol use: No     Alcohol/week: 0.0 standard drinks of alcohol     Drug use: Not  Currently     Types: Marijuana       Family History    Mother has had multiple cardiac arrests and ended up with ESRD from DM2 requiring dialysis.    I have reviewed this patient's family history and updated it with pertinent information if needed.  Family History   Problem Relation Age of Onset     Depression Mother      Coronary Artery Disease Mother      Heart Disease Mother      Diabetes Mother      Hypertension Mother      Stomach Problem Mother      Obesity Mother      Depression Father      Hypertension Father      Alcoholism Father      Hyperlipidemia Father      Mental Illness Father      Asthma Father      Depression Maternal Grandmother      Hyperlipidemia Maternal Grandmother      Colon Cancer Maternal Grandmother      Diabetes Maternal Grandmother      Depression Maternal Grandfather      Hyperlipidemia Maternal Grandfather      Coronary Artery Disease Maternal Grandfather      Diabetes Maternal Grandfather      Hyperlipidemia Paternal Grandmother      Coronary Artery Disease Paternal Grandmother      Diabetes Paternal Grandmother      Diabetes Paternal Grandfather        Allergies   No Known Allergies     Physical Exam   Vital Signs: Temp: 97.2  F (36.2  C) Temp src: Temporal BP: (!) 143/85 Pulse: 103   Resp: 20 SpO2: 94 % O2 Device: None (Room air)    Weight: 160 lbs 0 oz    GENERAL:  Pleasant, cooperative, alert. Well developed, well nourished.  Appears nontoxic but somewhat uncomfortable at rest.  Shifting frequently in bed. NAD  HEENT: Normocephalic, atraumatic.  Extra occular mm intact.  Sclera clear. PERRL.  Mucous membranes moist.    PULMONOLOGY: Clear to auscultation bilaterally    CARDIAC: Regular rate and rhythm.  No appreciated murmur.     ABDOMEN: Soft, nontender   MUSCULOSKELETAL:  Moving x 4 spontaneously with CMS intact x4.  Normal bulk and tone.  No LE edema.     NEURO: Alert and oriented x3.  CN II-XII grossly intact and symmetric.  Nonfocal      Medical Decision Making       90  MINUTES SPENT BY ME on the date of service doing chart review, history, exam, documentation & further activities per the note.      Data     I have personally reviewed the following data over the past 24 hrs:    30.0 (H)  \   14.7   / 240     131 (L) 92 (L) 13.6 /  293 (H)   3.9 24 1.01 \       ALT: 12 AST: 12 AP: 219 (H) TBILI: 0.7   ALB: 4.3 TOT PROTEIN: 7.6 LIPASE: 19       TSH: N/A T4: N/A A1C: 14.6 (H)       Procal: N/A CRP: N/A Lactic Acid: 1.8         Imaging results reviewed over the past 24 hrs:   Recent Results (from the past 24 hour(s))   CT Abdomen Pelvis w Contrast    Narrative    EXAM: CT ABDOMEN AND PELVIS WITH CONTRAST  LOCATION: [facility]  DATE: 07/15/2023    INDICATION: Abdominal pain, vomiting, perineal infection, please extend down to mid thighs to get entire scrotal   perineal region.  COMPARISON: None.  TECHNIQUE: CT scan of the abdomen and pelvis was performed following injection of IV contrast. Multiplanar reformats were obtained. Dose reduction techniques were used.  CONTRAST: 80 mL Isovue 370.    FINDINGS:   LOWER CHEST: Normal.    HEPATOBILIARY: Cholelithiasis. Gas in the gallbladder lumen presumably from prior sphincterotomy.    PANCREAS: Normal.    SPLEEN: Normal.    ADRENAL GLANDS: Normal.    KIDNEYS/BLADDER: Normal.    BOWEL: Normal.    LYMPH NODES: Normal.    VASCULATURE: Unremarkable.    PELVIC ORGANS: There is edema on the left side of the perineum and scrotum. No gas in the soft tissues. No abscess.    MUSCULOSKELETAL: Normal.      Impression    IMPRESSION:   1.  Edema in the scrotum and left side of the perineum without evidence of abscess or gas.  2.  Cholelithiasis.

## 2023-07-16 LAB
ANION GAP SERPL CALCULATED.3IONS-SCNC: 13 MMOL/L (ref 7–15)
BASOPHILS # BLD AUTO: 0.1 10E3/UL (ref 0–0.2)
BASOPHILS NFR BLD AUTO: 0 %
BUN SERPL-MCNC: 10.6 MG/DL (ref 6–20)
CALCIUM SERPL-MCNC: 8.5 MG/DL (ref 8.6–10)
CHLORIDE SERPL-SCNC: 98 MMOL/L (ref 98–107)
CREAT SERPL-MCNC: 0.88 MG/DL (ref 0.67–1.17)
DEPRECATED HCO3 PLAS-SCNC: 21 MMOL/L (ref 22–29)
EOSINOPHIL # BLD AUTO: 0.2 10E3/UL (ref 0–0.7)
EOSINOPHIL NFR BLD AUTO: 1 %
ERYTHROCYTE [DISTWIDTH] IN BLOOD BY AUTOMATED COUNT: 12 % (ref 10–15)
GFR SERPL CREATININE-BSD FRML MDRD: >90 ML/MIN/1.73M2
GLUCOSE BLDC GLUCOMTR-MCNC: 199 MG/DL (ref 70–99)
GLUCOSE BLDC GLUCOMTR-MCNC: 230 MG/DL (ref 70–99)
GLUCOSE BLDC GLUCOMTR-MCNC: 230 MG/DL (ref 70–99)
GLUCOSE BLDC GLUCOMTR-MCNC: 231 MG/DL (ref 70–99)
GLUCOSE BLDC GLUCOMTR-MCNC: 316 MG/DL (ref 70–99)
GLUCOSE SERPL-MCNC: 214 MG/DL (ref 70–99)
HCT VFR BLD AUTO: 33.6 % (ref 40–53)
HGB BLD-MCNC: 11.4 G/DL (ref 13.3–17.7)
IMM GRANULOCYTES # BLD: 0.1 10E3/UL
IMM GRANULOCYTES NFR BLD: 1 %
LACTATE SERPL-SCNC: 0.8 MMOL/L (ref 0.7–2)
LYMPHOCYTES # BLD AUTO: 1.9 10E3/UL (ref 0.8–5.3)
LYMPHOCYTES NFR BLD AUTO: 9 %
MCH RBC QN AUTO: 29.4 PG (ref 26.5–33)
MCHC RBC AUTO-ENTMCNC: 33.9 G/DL (ref 31.5–36.5)
MCV RBC AUTO: 87 FL (ref 78–100)
MONOCYTES # BLD AUTO: 2 10E3/UL (ref 0–1.3)
MONOCYTES NFR BLD AUTO: 10 %
NEUTROPHILS # BLD AUTO: 16.1 10E3/UL (ref 1.6–8.3)
NEUTROPHILS NFR BLD AUTO: 79 %
NRBC # BLD AUTO: 0 10E3/UL
NRBC BLD AUTO-RTO: 0 /100
PLATELET # BLD AUTO: 210 10E3/UL (ref 150–450)
POTASSIUM SERPL-SCNC: 3.8 MMOL/L (ref 3.4–5.3)
RBC # BLD AUTO: 3.88 10E6/UL (ref 4.4–5.9)
SODIUM SERPL-SCNC: 132 MMOL/L (ref 136–145)
WBC # BLD AUTO: 20.4 10E3/UL (ref 4–11)

## 2023-07-16 PROCEDURE — 99222 1ST HOSP IP/OBS MODERATE 55: CPT | Mod: GC | Performed by: UROLOGY

## 2023-07-16 PROCEDURE — 85004 AUTOMATED DIFF WBC COUNT: CPT | Performed by: PHYSICIAN ASSISTANT

## 2023-07-16 PROCEDURE — 120N000001 HC R&B MED SURG/OB

## 2023-07-16 PROCEDURE — 99233 SBSQ HOSP IP/OBS HIGH 50: CPT | Performed by: INTERNAL MEDICINE

## 2023-07-16 PROCEDURE — 250N000013 HC RX MED GY IP 250 OP 250 PS 637: Performed by: PHYSICIAN ASSISTANT

## 2023-07-16 PROCEDURE — 250N000013 HC RX MED GY IP 250 OP 250 PS 637: Performed by: INTERNAL MEDICINE

## 2023-07-16 PROCEDURE — 250N000011 HC RX IP 250 OP 636: Mod: JZ | Performed by: PHYSICIAN ASSISTANT

## 2023-07-16 PROCEDURE — 83605 ASSAY OF LACTIC ACID: CPT | Performed by: INTERNAL MEDICINE

## 2023-07-16 PROCEDURE — 36415 COLL VENOUS BLD VENIPUNCTURE: CPT | Performed by: INTERNAL MEDICINE

## 2023-07-16 PROCEDURE — 250N000011 HC RX IP 250 OP 636: Performed by: PHYSICIAN ASSISTANT

## 2023-07-16 PROCEDURE — 36415 COLL VENOUS BLD VENIPUNCTURE: CPT | Performed by: PHYSICIAN ASSISTANT

## 2023-07-16 PROCEDURE — 82310 ASSAY OF CALCIUM: CPT | Performed by: PHYSICIAN ASSISTANT

## 2023-07-16 RX ORDER — LISINOPRIL 20 MG/1
20 TABLET ORAL DAILY
Status: DISCONTINUED | OUTPATIENT
Start: 2023-07-16 | End: 2023-07-20 | Stop reason: HOSPADM

## 2023-07-16 RX ADMIN — HYDROMORPHONE HYDROCHLORIDE 0.5 MG: 1 INJECTION, SOLUTION INTRAMUSCULAR; INTRAVENOUS; SUBCUTANEOUS at 19:40

## 2023-07-16 RX ADMIN — PIPERACILLIN SODIUM AND TAZOBACTAM SODIUM 3.38 G: 3; .375 INJECTION, SOLUTION INTRAVENOUS at 11:08

## 2023-07-16 RX ADMIN — INSULIN ASPART 2 UNITS: 100 INJECTION, SOLUTION INTRAVENOUS; SUBCUTANEOUS at 02:32

## 2023-07-16 RX ADMIN — ACETAMINOPHEN 1000 MG: 500 TABLET, FILM COATED ORAL at 00:07

## 2023-07-16 RX ADMIN — HYDROMORPHONE HYDROCHLORIDE 0.5 MG: 1 INJECTION, SOLUTION INTRAMUSCULAR; INTRAVENOUS; SUBCUTANEOUS at 17:02

## 2023-07-16 RX ADMIN — CLINDAMYCIN PHOSPHATE 900 MG: 900 INJECTION, SOLUTION INTRAVENOUS at 00:08

## 2023-07-16 RX ADMIN — INSULIN ASPART 2 UNITS: 100 INJECTION, SOLUTION INTRAVENOUS; SUBCUTANEOUS at 22:28

## 2023-07-16 RX ADMIN — INSULIN ASPART 2 UNITS: 100 INJECTION, SOLUTION INTRAVENOUS; SUBCUTANEOUS at 06:20

## 2023-07-16 RX ADMIN — INSULIN ASPART 4 UNITS: 100 INJECTION, SOLUTION INTRAVENOUS; SUBCUTANEOUS at 10:54

## 2023-07-16 RX ADMIN — HYDROMORPHONE HYDROCHLORIDE 0.5 MG: 1 INJECTION, SOLUTION INTRAMUSCULAR; INTRAVENOUS; SUBCUTANEOUS at 14:29

## 2023-07-16 RX ADMIN — CLINDAMYCIN PHOSPHATE 900 MG: 900 INJECTION, SOLUTION INTRAVENOUS at 10:08

## 2023-07-16 RX ADMIN — POTASSIUM CHLORIDE AND SODIUM CHLORIDE: 900; 150 INJECTION, SOLUTION INTRAVENOUS at 05:24

## 2023-07-16 RX ADMIN — INSULIN ASPART 2 UNITS: 100 INJECTION, SOLUTION INTRAVENOUS; SUBCUTANEOUS at 17:15

## 2023-07-16 RX ADMIN — HYDROMORPHONE HYDROCHLORIDE 1 MG: 1 INJECTION, SOLUTION INTRAMUSCULAR; INTRAVENOUS; SUBCUTANEOUS at 22:27

## 2023-07-16 RX ADMIN — POTASSIUM CHLORIDE AND SODIUM CHLORIDE: 900; 150 INJECTION, SOLUTION INTRAVENOUS at 15:18

## 2023-07-16 RX ADMIN — ACETAMINOPHEN 1000 MG: 500 TABLET, FILM COATED ORAL at 17:37

## 2023-07-16 RX ADMIN — VANCOMYCIN HYDROCHLORIDE 1000 MG: 1 INJECTION, SOLUTION INTRAVENOUS at 19:48

## 2023-07-16 RX ADMIN — OXYCODONE HYDROCHLORIDE 10 MG: 5 TABLET ORAL at 22:22

## 2023-07-16 RX ADMIN — KETOROLAC TROMETHAMINE 15 MG: 15 INJECTION, SOLUTION INTRAMUSCULAR; INTRAVENOUS at 23:23

## 2023-07-16 RX ADMIN — OXYCODONE HYDROCHLORIDE 10 MG: 5 TABLET ORAL at 10:53

## 2023-07-16 RX ADMIN — CLINDAMYCIN PHOSPHATE 900 MG: 900 INJECTION, SOLUTION INTRAVENOUS at 17:56

## 2023-07-16 RX ADMIN — OXYCODONE HYDROCHLORIDE 10 MG: 5 TABLET ORAL at 01:32

## 2023-07-16 RX ADMIN — ACETAMINOPHEN 1000 MG: 500 TABLET, FILM COATED ORAL at 23:23

## 2023-07-16 RX ADMIN — KETOROLAC TROMETHAMINE 15 MG: 15 INJECTION, SOLUTION INTRAMUSCULAR; INTRAVENOUS at 10:54

## 2023-07-16 RX ADMIN — NICOTINE 1 PATCH: 21 PATCH, EXTENDED RELEASE TRANSDERMAL at 08:43

## 2023-07-16 RX ADMIN — ACETAMINOPHEN 1000 MG: 500 TABLET, FILM COATED ORAL at 08:42

## 2023-07-16 RX ADMIN — KETOROLAC TROMETHAMINE 15 MG: 15 INJECTION, SOLUTION INTRAMUSCULAR; INTRAVENOUS at 17:02

## 2023-07-16 RX ADMIN — LISINOPRIL 20 MG: 20 TABLET ORAL at 10:57

## 2023-07-16 RX ADMIN — OXYCODONE HYDROCHLORIDE 10 MG: 5 TABLET ORAL at 06:26

## 2023-07-16 RX ADMIN — PIPERACILLIN SODIUM AND TAZOBACTAM SODIUM 3.38 G: 3; .375 INJECTION, SOLUTION INTRAVENOUS at 23:34

## 2023-07-16 RX ADMIN — PIPERACILLIN SODIUM AND TAZOBACTAM SODIUM 3.38 G: 3; .375 INJECTION, SOLUTION INTRAVENOUS at 05:25

## 2023-07-16 RX ADMIN — OXYCODONE HYDROCHLORIDE 10 MG: 5 TABLET ORAL at 17:37

## 2023-07-16 RX ADMIN — VANCOMYCIN HYDROCHLORIDE 1000 MG: 1 INJECTION, SOLUTION INTRAVENOUS at 08:41

## 2023-07-16 RX ADMIN — PIPERACILLIN SODIUM AND TAZOBACTAM SODIUM 3.38 G: 3; .375 INJECTION, SOLUTION INTRAVENOUS at 17:14

## 2023-07-16 ASSESSMENT — ACTIVITIES OF DAILY LIVING (ADL)
ADLS_ACUITY_SCORE: 20
DEPENDENT_IADLS:: INDEPENDENT
ADLS_ACUITY_SCORE: 20

## 2023-07-16 NOTE — PLAN OF CARE
"Care from [id number]    Inpatient Progress Note:  For complete assessment see flow sheet documentation.    BP (!) 164/87 (BP Location: Left arm)   Pulse 107   Temp 100  F (37.8  C) (Temporal)   Resp 16   Ht 1.651 m (5' 5\")   Wt 72.6 kg (160 lb)   SpO2 98%   BMI 26.63 kg/m         Orientation: A&OX4  Pain status: Moderate pain to perineal area. PRN oxycodone given.  Activity: Independent   Resp: WNL  Cardiac: WNL  GI: WNL, LBM today  :  WNL  Skin: Cellulitis marked on perineal area, mildly tender to touch  LDA: PIV x2  Infusions: NS with 20KCL @ 150ml/hr   Diet: Modified carb  Consults: , Colorectal  Discharge Plan: TBD    Will continue to monitor and provide cares.     Sandra Sanchez RN    "

## 2023-07-16 NOTE — PROGRESS NOTES
[facility]    Medicine Progress Note - Hospitalist Service    Date of Admission:  7/15/2023    Primary Care Physician   Rell Byrd  CONSULTANTS: Urology, colorectal surgery    Assessment & Plan   Ubaldo Zamora is a 32 year old male with long-standing poorly controlled DM2 and history of DKA and dental infections who presented to [facility] with several days perineal pain radiating to the scrotum and down the inner thighs with associate N/V.  He is found to have a draining perineal cellulitis with sepsis (WBC 30, ANC 25.2) and non-acidotic hyperglycemia with ketosis.  CT Abd/Pelv showed perineal swelling without clear abscess or gas formation in addition to some gas noted in the gallbladder.  Received 1L NS.  Empirically started on Clindamycin, Zosyn, and Vanc.  Urology/colorectal surgery consulted; will see.  Hospitalists admitting to inpatient.       Perineal cellulitis with sepsis  Meets sepsis criteria based on WBC 30, ANC 25.2.  While patient noted to have a small draining area, CT Abd/Pelv notable for edema but negative for abscess or gas.  Ambulating but having a difficult time sitting or laying.  No recent trauma including poorly fitting clothing (wears boxers).  No history of perineal infections.  Seen by both urology and colorectal surgery and not needing any surgical exploration at this point.  Culture was taken by urology exploring the wound, wound culture/blood cultures are pending.  Able to eat at this point is no plan for surgery.  Continue broad-spectrum antibiotics with IV  empiric clindamycin, Zosyn, vancomycin.  Symptoms are already improving.  Continue pain control as well.  No signs of Julisa's gangrene.  Patient still with a low-grade fever of 100.9.      Longstanding poorly controlled DM2 complicated by recurring infections and medication noncompliance  HgbA1c 14.  BG in the 200s at recent PCP visit but now > 500 admission.  While patient does have a mild ketosis, does  not fit the diagnosis of DKA given a lack of acidosis.  Largely asymptomatic.  Diagnosed a very early age but is felt to be type 2.  During teenage years, which was followed by pediatric endocrinology.  Has only ever been on metformin which has been largely ineffective.  C-peptide and microalbumin urine 6/2015 normal.  HgbA1c was 9-10 since at least 2012.  Patient has largely declared himself as necessitating insulin management.  He has not been caring for himself as he has no insurance but he states that that is not an excuse.  Patient was placed on Lantus and a correction scale here.  Blood sugars started at 533 and now is down to 214.  No signs of DKA at this point.  Will increase his insulin by adding prandial insulin and titrate up his Lantus.   Does not have a glucometer at home.  Glucometer with associated test strips, lancets, alcohol swabs, and sharps container ordered in the discharge navigator.  Additionally, endocrinology and diabetes educator referrals also placed in the discharge navigator.  Social work to see about financial issues.  A long discussion about the consequences of uncontrolled diabetes and the long-term including renal failure, blindness, impotence, etc.        HTN  BP elevated on admission.  Has been on lisinopril in the past but uncertain if this was for HTN or for nephroprotection in setting of DM2.  May be elevated today due to pain.  Patient is to continue on lisinopril.  We will need to follow his BMP in the future.  Patient with a normal creatinine 0.88.        Tobacco dependence  Smokes ~1ppd.  Agreeable to nicotine replacement.  Tobacco cessation strongly encouraged, in part because of his underlying DM and predisposition to ASCVD (mother had early cardiac disease).  Was told to spend his money on his medications or other than tobacco.    Noncompliance/financial issues/no insurance:  Patient has been noncompliant with taking care of himself due to the fact he has no insurance.   "He states that is not an excuse and he knows it is important to care for himself especially his diabetes.   needs to see the patient.  His noncompliance puts him at a high risk for readmissions, morbidity, and early mortality.         Clinically Significant Risk Factors Present on Admission                  # Hypertension: Home medication list includes antihypertensive(s)      # Overweight: Estimated body mass index is 26.63 kg/m  as calculated from the following:    Height as of this encounter: 1.651 m (5' 5\").    Weight as of this encounter: 72.6 kg (160 lb).           Discussed plan of care with bedside nursing, patient's girlfriend was also in the room and updated.  Patient is new to me today.      Diet: Advance Diet as Tolerated: Fully Advanced to diet(s) per Provider order; Moderate Consistent Carb (60 g CHO per Meal) Diet    DVT Prophylaxis: Ambulate every shift  Boss Catheter: Not present  Lines: None     Cardiac Monitoring: None    RESTRAINTS: Not indicated  Code Status: Full Code        Follow up plan: We will need to follow-up with diabetic education and possibly endocrine/getting a primary care provider.       This document was created using voice recognition technology.  Please excuse any typographical errors that may have occurred.  Please call with any questions.       Clinically Significant Risk Factors Present on Admission                  # Hypertension: Home medication list includes antihypertensive(s)     # DMII: A1C = 14.6 % (Ref range: <5.7 %) within past 6 months    # Overweight: Estimated body mass index is 28.21 kg/m  as calculated from the following:    Height as of this encounter: 1.651 m (5' 5\").    Weight as of this encounter: 76.9 kg (169 lb 8.5 oz).            Disposition Plan     Expected Discharge Date: 07/17/2023                  Barrier to discharge: Groin infection, need to be able to transition to oral antibiotics.  Await culture results.    Corey Rangel, " MD  Hospitalist Service  [facility]  Securely message with [employer] (more info)  Text page via [employer] Paging/Directory   ______________________________________________________________________    Interval History    Patient overnight is doing better.  Still having pain and being uncomfortable due to his infection.  Still having low-grade fever but denies any chills and sweats today but had them last night.  Had a long discussion about his compliance at home with his diabetic control and the fact that his lack of insurance is not an excuse for not taking care of himself.  Patient's girlfriend is bedside.    ROS: A comprehensive review of systems was negative except for items noted in the HPI.  Patient currently denies any nausea, vomiting, diarrhea, shortness of breath, or chest pain.       Physical Exam   Vital Signs: Temp: 100.4  F (38  C) Temp src: Temporal BP: (!) 144/87 Pulse: 109   Resp: 18 SpO2: 94 % O2 Device: None (Room air)    Weight: 169 lbs 8.54 oz    Exam is slightly improved from admission  General appearance: Patient is alert and oriented x3, no apparent distress, pleasant and conversing normally, speaking in full sentences, appears stated age, does not appear appreciably ill  HEENT:  Mucous membranes moist.    PULMONOLOGY: Clear to auscultation bilaterally  , good air movement  CARDIAC: Regular rate and rhythm.  No appreciated murmur.     ABDOMEN: Soft, nontender  Pelvis/: Patient with a perianal cellulitis with improvement of his erythema around his outlined markings, scrotum is still red with swelling  MUSCULOSKELETAL: Moves all extremities, No edema.     NEURO: Alert and oriented x3.  CN II-XII grossly intact and symmetric.    No focal deficits      Data     I have personally reviewed the following data over the past 24 hrs:    20.4 (H)  \   11.4 (L)   / 210     132 (L) 98 10.6 /  230 (H)   3.8 21 (L) 0.88 \       TSH: N/A T4: N/A A1C: N/A       Procal: N/A CRP: N/A Lactic  Acid: 0.7         Imaging:   Results for orders placed or performed during the hospital encounter of 07/15/23   CT Abdomen Pelvis w Contrast    Narrative    EXAM: CT ABDOMEN AND PELVIS WITH CONTRAST  LOCATION: [facility]  DATE: 07/15/2023    IMPRESSION:   1.  Edema in the scrotum and left side of the perineum without evidence of abscess or gas.  2.  Cholelithiasis.         Procedures: None  I have personally have reviewed the patient's most up to date radiologic exams,   labs, orders, and medications myself

## 2023-07-16 NOTE — PROGRESS NOTES
COLON & RECTAL SURGERY  PROGRESS NOTE    July 16, 2023      SUBJECTIVE:  Pain improving, less drainage from wound. No perianal or rectal pain, able to tolerate diet and having bowel function. Still having low grade fevers, wbc downtrending, now 20. Glucose control improving    OBJECTIVE:  Temp:  [97.6  F (36.4  C)-101.4  F (38.6  C)] 100.9  F (38.3  C)  Pulse:  [107-109] 108  Resp:  [16-20] 18  BP: (144-166)/(84-90) 147/84  SpO2:  [90 %-98 %] 94 %    Intake/Output Summary (Last 24 hours) at 7/16/2023 1116  Last data filed at 7/16/2023 0621  Gross per 24 hour   Intake 2026 ml   Output --   Net 2026 ml       General - Awake alert and oriented, appears stated age, non toxic  Pulm - Non-labored breathing with normal respiratory effort  Rectal exam- a small area of induration on left anterior of perineum, just slightly inferior to the base of the penis with no drainage today. Mild tenderness to palpation. Scrotal erythema and tenderness - improving  No perianal fistulas, fissures, or external hemorrhoids. No perianal abscess appreciated    LABS:  Lab Results   Component Value Date    WBC 20.4 07/16/2023    WBC 9.8 05/15/2016     Lab Results   Component Value Date    HGB 11.4 07/16/2023    HGB 17.2 05/15/2016     Lab Results   Component Value Date    HCT 33.6 07/16/2023    HCT 47.1 05/15/2016     Lab Results   Component Value Date     07/16/2023     05/15/2016     Last Basic Metabolic Panel:  Lab Results   Component Value Date     07/16/2023     05/20/2016      Lab Results   Component Value Date    POTASSIUM 3.8 07/16/2023    POTASSIUM 3.9 06/01/2022    POTASSIUM 4.4 05/20/2016     Lab Results   Component Value Date    CHLORIDE 98 07/16/2023    CHLORIDE 108 06/01/2022    CHLORIDE 107 05/20/2016     Lab Results   Component Value Date    JACKIE 8.5 07/16/2023    JACKIE 9.1 05/20/2016     Lab Results   Component Value Date    CO2 21 07/16/2023    CO2 26 06/01/2022    CO2 26 05/20/2016     Lab Results    Component Value Date    BUN 10.6 07/16/2023    BUN 15 06/01/2022    BUN 14 05/20/2016     Lab Results   Component Value Date    CR 0.88 07/16/2023    CR 0.66 05/20/2016     Lab Results   Component Value Date     07/16/2023     07/16/2023     06/01/2022     05/20/2016       ASSESSMENT/PLAN:32 year old male who presented with perineal/scrotal infection, improving on clinical exam. No evidence of perianal abscess, although did explain to the patient that the area of induration might become fluctuance with abx vs. completely resolve as he improves  - Cont abx treatment  - cont to optimize glucose control  - Cont to monitor perineum  Management per primary team    For questions/paging, please contact the CRS office at [phone].    Tamra Orta MD  Colon and Rectal Surgery Fellow  [facility]  [address] [address]  T: [phone]  F: [phone]

## 2023-07-16 NOTE — CONSULTS
Care Management Initial Consult    General Information  Assessment completed with: Patient,    Type of CM/SW Visit: Initial Assessment    Primary Care Provider verified and updated as needed:     Readmission within the last 30 days:        Reason for Consult: discharge planning, financial concerns, insurance concerns  Advance Care Planning:            Communication Assessment  Patient's communication style: spoken language (English or Bilingual)    Hearing Difficulty or Deaf: no   Wear Glasses or Blind: yes    Cognitive  Cognitive/Neuro/Behavioral: WDL                      Living Environment:   People in home: significant other     Current living Arrangements: other (see comments) (TH)      Able to return to prior arrangements: yes       Family/Social Support:  Care provided by: self  Provides care for:    Marital Status: Lives with Significant Other  Significant Other          Description of Support System: Supportive, Involved         Current Resources:   Patient receiving home care services: No     Community Resources: None  Equipment currently used at home: none  Supplies currently used at home:      Employment/Financial:  Employment Status: employed full-time        Financial Concerns:   Yes. No insurance and doesn't have enough money to pay for [place] each month       Does the patient's insurance plan have a 3 day qualifying hospital stay waiver?  No    Lifestyle & Psychosocial Needs:  Social Determinants of Health     Tobacco Use: High Risk (7/15/2023)    Patient History      Smoking Tobacco Use: Every Day      Smokeless Tobacco Use: Never      Passive Exposure: Not on file   Alcohol Use: Not on file   Financial Resource Strain: Not on file   Food Insecurity: Not on file   Transportation Needs: Not on file   Physical Activity: Not on file   Stress: Not on file   Social Connections: Not on file   Intimate Partner Violence: Not on file   Depression: Not at risk (6/1/2022)    PHQ-2      PHQ-2 Score: 0    Housing Stability: Not on file       Functional Status:  Prior to admission patient needed assistance:   Dependent ADLs:: Independent  Dependent IADLs:: Independent       Mental Health Status:  Mental Health Status: No Current Concerns       Chemical Dependency Status:  Chemical Dependency Status: No Current Concerns             Values/Beliefs:  Spiritual, Cultural Beliefs, Uatsdin Practices, Values that affect care:                 Additional Information:  ADDI met with pt and his girlfriend. Pt lives in a town home with his girlfriend, he has one child and pays $600/month in child support.  He works at [employer] full time but he is not able to afford the insurance because the premium is around $500 month.  He reports he [place], however, he is not able to afford the monthly premium for that which is around $300, therefore he has no insurance and has not been able to be on insulin for over a yr.      ADDI will leave message for  financial counselors to meet with him to determine if they have any resources for discounts on meds.    ADDI spoke with charge nurse who will have a consult put in for a pharmacy liaison to review other options.      Addi provided pt with resources for free/sliding scale clinics, [employer] has a diabetes program for $35, provided him with contact number for [employer] and 211 for other resources.       Rachna ESTRADA, [facility]  Inpatient Care Coordination   [facility]   [phone]

## 2023-07-16 NOTE — PLAN OF CARE
Goal Outcome Evaluation:      Plan of Care Reviewed With: patient, significant other    Overall Patient Progress: no changeOverall Patient Progress: no change    7a-7p RN  VS monitored, temp max 100.9, tachycardia, ind, voiding, highest  ml, lbm 7/16, 1 episode of projectile clear/green emesis at beginning  of shift this morning, no preceding nausea and able to yvette reg diet rest of day, IV Vanco/Cleocin/Zosyn cont, /230, Tylenol/Oxycodone/IV Toradol/IV Dilaudid 2x for pain, scrotum edematous/red/painful, quarter size ecchymosis to L scrotum outlined today, entire area outlined and has not gone past borders, educated pt to elevate scrotum with a towel and used ice to help w/edema/pain, SO at b/s, triggered sepsis protocol, lactic 0.8, will cont to monitor closely.

## 2023-07-16 NOTE — PLAN OF CARE
Goal Outcome Evaluation:      Plan of Care Reviewed With: patient    Overall Patient Progress: improvingOverall Patient Progress: improving     Pt A/O x4. VSS; flight fever over night, PRN tylenol given with improvements. PIV infusing. Pain managed with PRN tylenol and oxycodone. Independent in room. Voiding well. Tolerating a regular diet. Q4H blood sugar checks. CMS intact. Cellulitis to L groin area within boarder. Plan is TBD, continue IV abx.

## 2023-07-17 LAB
ANION GAP SERPL CALCULATED.3IONS-SCNC: 8 MMOL/L (ref 7–15)
BUN SERPL-MCNC: 11.9 MG/DL (ref 6–20)
CALCIUM SERPL-MCNC: 8.8 MG/DL (ref 8.6–10)
CHLORIDE SERPL-SCNC: 103 MMOL/L (ref 98–107)
CREAT SERPL-MCNC: 1.02 MG/DL (ref 0.67–1.17)
DEPRECATED HCO3 PLAS-SCNC: 24 MMOL/L (ref 22–29)
ERYTHROCYTE [DISTWIDTH] IN BLOOD BY AUTOMATED COUNT: 12.3 % (ref 10–15)
GFR SERPL CREATININE-BSD FRML MDRD: >90 ML/MIN/1.73M2
GLUCOSE BLDC GLUCOMTR-MCNC: 157 MG/DL (ref 70–99)
GLUCOSE BLDC GLUCOMTR-MCNC: 184 MG/DL (ref 70–99)
GLUCOSE BLDC GLUCOMTR-MCNC: 194 MG/DL (ref 70–99)
GLUCOSE BLDC GLUCOMTR-MCNC: 195 MG/DL (ref 70–99)
GLUCOSE BLDC GLUCOMTR-MCNC: 196 MG/DL (ref 70–99)
GLUCOSE BLDC GLUCOMTR-MCNC: 94 MG/DL (ref 70–99)
GLUCOSE SERPL-MCNC: 200 MG/DL (ref 70–99)
HCT VFR BLD AUTO: 30.8 % (ref 40–53)
HGB BLD-MCNC: 10.3 G/DL (ref 13.3–17.7)
MCH RBC QN AUTO: 29.7 PG (ref 26.5–33)
MCHC RBC AUTO-ENTMCNC: 33.4 G/DL (ref 31.5–36.5)
MCV RBC AUTO: 89 FL (ref 78–100)
PLATELET # BLD AUTO: 199 10E3/UL (ref 150–450)
POTASSIUM SERPL-SCNC: 4.1 MMOL/L (ref 3.4–5.3)
RBC # BLD AUTO: 3.47 10E6/UL (ref 4.4–5.9)
SODIUM SERPL-SCNC: 135 MMOL/L (ref 136–145)
WBC # BLD AUTO: 17.6 10E3/UL (ref 4–11)

## 2023-07-17 PROCEDURE — G0463 HOSPITAL OUTPT CLINIC VISIT: HCPCS

## 2023-07-17 PROCEDURE — 250N000013 HC RX MED GY IP 250 OP 250 PS 637: Performed by: INTERNAL MEDICINE

## 2023-07-17 PROCEDURE — 250N000011 HC RX IP 250 OP 636: Performed by: PHYSICIAN ASSISTANT

## 2023-07-17 PROCEDURE — 250N000011 HC RX IP 250 OP 636: Mod: JZ | Performed by: PHYSICIAN ASSISTANT

## 2023-07-17 PROCEDURE — 120N000001 HC R&B MED SURG/OB

## 2023-07-17 PROCEDURE — 85027 COMPLETE CBC AUTOMATED: CPT | Performed by: PHYSICIAN ASSISTANT

## 2023-07-17 PROCEDURE — 36415 COLL VENOUS BLD VENIPUNCTURE: CPT | Performed by: PHYSICIAN ASSISTANT

## 2023-07-17 PROCEDURE — 99233 SBSQ HOSP IP/OBS HIGH 50: CPT | Performed by: INTERNAL MEDICINE

## 2023-07-17 PROCEDURE — 250N000013 HC RX MED GY IP 250 OP 250 PS 637: Performed by: PHYSICIAN ASSISTANT

## 2023-07-17 PROCEDURE — 80048 BASIC METABOLIC PNL TOTAL CA: CPT | Performed by: PHYSICIAN ASSISTANT

## 2023-07-17 RX ORDER — NICOTINE POLACRILEX 4 MG
15-30 LOZENGE BUCCAL
Status: DISCONTINUED | OUTPATIENT
Start: 2023-07-17 | End: 2023-07-20 | Stop reason: HOSPADM

## 2023-07-17 RX ORDER — DEXTROSE MONOHYDRATE 25 G/50ML
25-50 INJECTION, SOLUTION INTRAVENOUS
Status: DISCONTINUED | OUTPATIENT
Start: 2023-07-17 | End: 2023-07-20 | Stop reason: HOSPADM

## 2023-07-17 RX ADMIN — KETOROLAC TROMETHAMINE 15 MG: 15 INJECTION, SOLUTION INTRAMUSCULAR; INTRAVENOUS at 11:28

## 2023-07-17 RX ADMIN — OXYCODONE HYDROCHLORIDE 5 MG: 5 TABLET ORAL at 02:30

## 2023-07-17 RX ADMIN — LISINOPRIL 20 MG: 20 TABLET ORAL at 08:34

## 2023-07-17 RX ADMIN — POTASSIUM CHLORIDE AND SODIUM CHLORIDE: 900; 150 INJECTION, SOLUTION INTRAVENOUS at 03:06

## 2023-07-17 RX ADMIN — INSULIN ASPART 1 UNITS: 100 INJECTION, SOLUTION INTRAVENOUS; SUBCUTANEOUS at 09:22

## 2023-07-17 RX ADMIN — PIPERACILLIN SODIUM AND TAZOBACTAM SODIUM 3.38 G: 3; .375 INJECTION, SOLUTION INTRAVENOUS at 05:17

## 2023-07-17 RX ADMIN — CLINDAMYCIN PHOSPHATE 900 MG: 900 INJECTION, SOLUTION INTRAVENOUS at 02:24

## 2023-07-17 RX ADMIN — INSULIN ASPART 2 UNITS: 100 INJECTION, SOLUTION INTRAVENOUS; SUBCUTANEOUS at 06:39

## 2023-07-17 RX ADMIN — HYDROMORPHONE HYDROCHLORIDE 1 MG: 1 INJECTION, SOLUTION INTRAMUSCULAR; INTRAVENOUS; SUBCUTANEOUS at 18:42

## 2023-07-17 RX ADMIN — OXYCODONE HYDROCHLORIDE 5 MG: 5 TABLET ORAL at 08:34

## 2023-07-17 RX ADMIN — HYDROMORPHONE HYDROCHLORIDE 1 MG: 1 INJECTION, SOLUTION INTRAMUSCULAR; INTRAVENOUS; SUBCUTANEOUS at 22:06

## 2023-07-17 RX ADMIN — HYDROMORPHONE HYDROCHLORIDE 1 MG: 1 INJECTION, SOLUTION INTRAMUSCULAR; INTRAVENOUS; SUBCUTANEOUS at 12:17

## 2023-07-17 RX ADMIN — INSULIN ASPART 1 UNITS: 100 INJECTION, SOLUTION INTRAVENOUS; SUBCUTANEOUS at 02:25

## 2023-07-17 RX ADMIN — HYDROMORPHONE HYDROCHLORIDE 0.5 MG: 1 INJECTION, SOLUTION INTRAMUSCULAR; INTRAVENOUS; SUBCUTANEOUS at 07:00

## 2023-07-17 RX ADMIN — ACETAMINOPHEN 1000 MG: 500 TABLET, FILM COATED ORAL at 06:39

## 2023-07-17 RX ADMIN — HYDROMORPHONE HYDROCHLORIDE 1 MG: 1 INJECTION, SOLUTION INTRAMUSCULAR; INTRAVENOUS; SUBCUTANEOUS at 14:19

## 2023-07-17 RX ADMIN — OXYCODONE HYDROCHLORIDE 10 MG: 5 TABLET ORAL at 22:40

## 2023-07-17 RX ADMIN — PIPERACILLIN SODIUM AND TAZOBACTAM SODIUM 3.38 G: 3; .375 INJECTION, SOLUTION INTRAVENOUS at 10:13

## 2023-07-17 RX ADMIN — ACETAMINOPHEN 1000 MG: 500 TABLET, FILM COATED ORAL at 18:37

## 2023-07-17 RX ADMIN — KETOROLAC TROMETHAMINE 15 MG: 15 INJECTION, SOLUTION INTRAMUSCULAR; INTRAVENOUS at 05:23

## 2023-07-17 RX ADMIN — OXYCODONE HYDROCHLORIDE 10 MG: 5 TABLET ORAL at 15:28

## 2023-07-17 RX ADMIN — VANCOMYCIN HYDROCHLORIDE 1000 MG: 1 INJECTION, SOLUTION INTRAVENOUS at 08:45

## 2023-07-17 RX ADMIN — CLINDAMYCIN PHOSPHATE 900 MG: 900 INJECTION, SOLUTION INTRAVENOUS at 11:28

## 2023-07-17 RX ADMIN — PIPERACILLIN SODIUM AND TAZOBACTAM SODIUM 3.38 G: 3; .375 INJECTION, SOLUTION INTRAVENOUS at 22:33

## 2023-07-17 RX ADMIN — HYDROMORPHONE HYDROCHLORIDE 1 MG: 1 INJECTION, SOLUTION INTRAMUSCULAR; INTRAVENOUS; SUBCUTANEOUS at 10:13

## 2023-07-17 RX ADMIN — PIPERACILLIN SODIUM AND TAZOBACTAM SODIUM 3.38 G: 3; .375 INJECTION, SOLUTION INTRAVENOUS at 17:28

## 2023-07-17 RX ADMIN — ACETAMINOPHEN 1000 MG: 500 TABLET, FILM COATED ORAL at 12:17

## 2023-07-17 RX ADMIN — NICOTINE 1 PATCH: 21 PATCH, EXTENDED RELEASE TRANSDERMAL at 08:34

## 2023-07-17 RX ADMIN — CLINDAMYCIN PHOSPHATE 900 MG: 900 INJECTION, SOLUTION INTRAVENOUS at 18:37

## 2023-07-17 RX ADMIN — KETOROLAC TROMETHAMINE 15 MG: 15 INJECTION, SOLUTION INTRAMUSCULAR; INTRAVENOUS at 17:29

## 2023-07-17 ASSESSMENT — ACTIVITIES OF DAILY LIVING (ADL)
ADLS_ACUITY_SCORE: 20

## 2023-07-17 NOTE — PROGRESS NOTES
[facility]  Hospitalist Progress Note  Douglas Mccoy MD 07/17/2023    Reason for Stay (Diagnosis): perineal/scrotal cellulitis         Assessment and Plan:      Summary of Stay: Ubaldo Zamora is a 32 year old male with long-standing poorly controlled DM2 and history of DKA and dental infections who presented to [facility] with several days perineal pain radiating to the scrotum and down the inner thighs with associate N/V.  He is found to have a draining perineal cellulitis with sepsis (WBC 30, ANC 25.2) and non-acidotic hyperglycemia with ketosis.  CT Abd/Pelv showed perineal swelling without clear abscess or gas formation in addition to some gas noted in the gallbladder.  Received 1L NS.  Empirically started on Clindamycin, Zosyn, and Vanc.  Urology/colorectal surgery consulted.  At this time surgery is not felt to be indicated and patient stable/improved with IV antibiotics    Plans today:  - stop IV vancomycin.  No indication of MRSA infection  - continue IV Zosyn and IV clindamycin  - appreciate CRS and urology input  - d/w patient importance of better DM control going forward  - I updated sig other at bedside today      Perineal cellulitis with sepsis  - CT Abd/Pelv notable for edema but negative for abscess or gas. - Seen by both urology and colorectal surgery and not needing any surgical exploration at this point.    - Culture was taken by urology exploring the wound and is growing klebsiella and strep species   - treated with IV vanco (7/15-7/17)  - treated with IV Zosyn and IV clinda (7/15-present)      Longstanding poorly controlled DM2 complicated by recurring infections and medication noncompliance  - HgbA1c 14 with poor management  - started on insulin this admit with improved control.    - Does not have a glucometer at home.  Glucometer with associated test strips, lancets, alcohol swabs, and sharps container ordered in the discharge navigator.  Additionally, endocrinology and  "diabetes educator referrals also placed in the discharge navigator.  Social work to see about financial issues.  A long discussion about the consequences of uncontrolled diabetes and the long-term including renal failure, blindness, impotence, etc was had with patient by several providers (myself included)         HTN  - continue on lisinopril.           Tobacco dependence  Smokes ~1ppd.  Agreeable to nicotine replacement.  Tobacco cessation strongly encouraged, in part because of his underlying DM and predisposition to ASCVD (mother had early cardiac disease).    Noncompliance/financial issues/no insurance:  - Patient has been noncompliant with taking care of himself due to the fact he has no insurance.    - SW consulted     Clinically Significant Risk Factors Present on Admission         # Hypertension: Home medication list includes antihypertensive(s)      # Overweight: Estimated body mass index is 26.63 kg/m  as calculated from the following:    Height as of this encounter: 1.651 m (5' 5\").    Weight as of this encounter: 72.6 kg (160 lb).            Diet: Advance Diet as Tolerated: Fully Advanced to diet(s) per Provider order; Moderate Consistent Carb (60 g CHO per Meal) Diet    DVT Prophylaxis: Ambulate every shift  Boss Catheter: Not present  Lines: None     Cardiac Monitoring: None  DISPO:  Home anticipated after adequate IV antibiotic course and improvement in cellulitis; at least another 2-3 days in the hospital anticipated        Interval History (Subjective):      Feels that pain has improved.  He thinks scrotal size may be a bit larger than previous.  Has small amt of bloody drainage                  Physical Exam:      Last Vital Signs:  /64 (BP Location: Left arm)   Pulse 78   Temp 98  F (36.7  C) (Temporal)   Resp 20   Ht 1.651 m (5' 5\")   Wt 78.9 kg (174 lb)   SpO2 96%   BMI 28.96 kg/m      No intake or output data in the 24 hours ending 07/17/23 4382    Constitutional: Awake, alert, " cooperative, no apparent distress   Respiratory: Clear to auscultation bilaterally, no crackles or wheezing   Cardiovascular: Regular rate and rhythm, normal S1 and S2, and no murmur noted   Abdomen: Normal bowel sounds, soft, non-distended, non-tender   Skin: No rashes, no cyanosis, dry to touch   Neuro: Alert and oriented x3, no weakness, numbness, memory loss   Extremities: No edema, normal range of motion   Other(s): 2 small ulcers noted (scrotum and perineum).  Redness receding from previous perineal outlines.  Small amt of blood discharge.  Some induration of perineum and scrotum.  Scrotum approximately size of an orange        All other systems: Negative          Medications:      All current medications were reviewed with changes reflected in problem list.         Data:      All new lab and imaging data was reviewed.   Labs:  Recent Labs   Lab 07/17/23  1208 07/17/23  0847 07/16/23  0608 07/16/23  0548 07/15/23  0749 07/15/23  0550   NA  --  135*  --  132*  --  131*   POTASSIUM  --  4.1  --  3.8  --  3.9   CHLORIDE  --  103  --  98  --  92*   CO2  --  24  --  21*  --  24   ANIONGAP  --  8  --  13  --  15   * 184*  200*   < > 214*   < > 533*   BUN  --  11.9  --  10.6  --  13.6   CR  --  1.02  --  0.88  --  1.01   GFRESTIMATED  --  >90  --  >90  --  >90   JACKIE  --  8.8  --  8.5*  --  9.7    < > = values in this interval not displayed.     Recent Labs   Lab 07/17/23  0847   WBC 17.6*   HGB 10.3*   HCT 30.8*   MCV 89         Imaging:   No results found for this or any previous visit (from the past 24 hour(s)).

## 2023-07-17 NOTE — PROGRESS NOTES
[facility] Progress Note          Assessment and Plan:     Assessment:    Calculus of gallbladder without cholecystitis without obstruction    Cellulitis of perineum    Hyperglycemia    Poorly controlled diabetes mellitus (H)      Plan:   -Continue with broad-spectrum antibiotics.  Follow cultures.  Transition to culture specific as available and oral when clinically appropriate.  -Continue to monitor leukocytosis.  Slowly improving.  -Would recommend continuing with serial examinations to look for any concerning changes.  -If lack of improvement on conservative management with antibiotics, have low threshold for repeat imaging to look for areas amendable to aspiration or incision and drainage.  -Continue to work on diabetic control.  Stressed with the patient about the need to work on diabetes and establish with primary care provider as well as possible endocrinology due to concern for recurrent infections with poorly controlled blood sugar.  -We will continue to follow along.    Gay Mcclellan PA-C   [facility]  [phone]               Interval History:     Doing well.  Significant other at bedside.  Some pain.  Denies N/V/C/SOB/CP.  Tmax 100.2.  No tachycardia.  Hemoglobin A1c of 14.6%.   Circumcised phallus.  Scrotal erythema within marked lines.  Scrotal skin is warm only mildly tender with palpation.  No crepitus or eschar.  There is an area of granulation near the anus with no concerning findings.  2 areas of previous bruising appear to have opened up with bloody serosanguineous drainage.  Abscess/cellulitis culture has 1+ gram-negative bacilli and 3+ group B strep.  Patient is on IV vancomycin, Zosyn, and clindamycin.  WBC 17.6 (20.4 (30.0)).  Creatinine 1.02 EGFR greater than 90.              Review of Systems:     The 5 point Review of Systems is negative other than noted in the HPI             Medications:     Current Facility-Administered Medications Ordered in Epic    Medication Dose Route Frequency Last Rate Last Admin     acetaminophen (TYLENOL) tablet 500-1,000 mg  500-1,000 mg Oral 4x Daily PRN   1,000 mg at 07/17/23 0639     bisacodyl (DULCOLAX) suppository 10 mg  10 mg Rectal Daily PRN         clindamycin (CLEOCIN) 900 mg in 50 mL NS intermittent infusion  900 mg Intravenous Q8H   900 mg at 07/17/23 0224     glucose gel 15-30 g  15-30 g Oral Q15 Min PRN        Or     dextrose 50 % injection 25-50 mL  25-50 mL Intravenous Q15 Min PRN        Or     glucagon injection 1 mg  1 mg Subcutaneous Q15 Min PRN         HYDROmorphone (PF) (DILAUDID) injection 0.5-1 mg  0.5-1 mg Intravenous Q2H PRN   1 mg at 07/17/23 1013     insulin aspart (NovoLOG) injection (RAPID ACTING)   Subcutaneous TID AC   6 Units at 07/17/23 0922     insulin aspart (NovoLOG) injection (RAPID ACTING)  1-6 Units Subcutaneous Q4H   1 Units at 07/17/23 0922     insulin glargine (LANTUS PEN) injection 20 Units  20 Units Subcutaneous QAM AC   20 Units at 07/17/23 0922     ketorolac (TORADOL) injection 15 mg  15 mg Intravenous Q6H PRN   15 mg at 07/17/23 0523     lidocaine (LMX4) cream   Topical Q1H PRN         lidocaine 1 % 0.1-1 mL  0.1-1 mL Other Q1H PRN         lisinopril (ZESTRIL) tablet 20 mg  20 mg Oral Daily   20 mg at 07/17/23 0834     magnesium hydroxide (MILK OF MAGNESIA) suspension 30 mL  30 mL Oral Daily PRN         melatonin tablet 3 mg  3 mg Oral At Bedtime PRN         naloxone (NARCAN) injection 0.2 mg  0.2 mg Intravenous Q2 Min PRN        Or     naloxone (NARCAN) injection 0.4 mg  0.4 mg Intravenous Q2 Min PRN        Or     naloxone (NARCAN) injection 0.2 mg  0.2 mg Intramuscular Q2 Min PRN        Or     naloxone (NARCAN) injection 0.4 mg  0.4 mg Intramuscular Q2 Min PRN         nicotine (COMMIT) lozenge 2-4 mg  2-4 mg Buccal Q1H PRN         nicotine (NICODERM CQ) 21 MG/24HR 24 hr patch 1 patch  1 patch Transdermal Daily   1 patch at 07/17/23 0834     nicotine Patch in Place   Transdermal Q8H          ondansetron (ZOFRAN ODT) ODT tab 4 mg  4 mg Oral Q6H PRN        Or     ondansetron (ZOFRAN) injection 4 mg  4 mg Intravenous Q6H PRN         oxyCODONE (ROXICODONE) tablet 5-10 mg  5-10 mg Oral Q4H PRN   5 mg at 07/17/23 0834     piperacillin-tazobactam (ZOSYN) intermittent infusion 3.375 g  3.375 g Intravenous Q6H 100 mL/hr at 07/17/23 1013 3.375 g at 07/17/23 1013     prochlorperazine (COMPAZINE) injection 10 mg  10 mg Intravenous Q6H PRN        Or     prochlorperazine (COMPAZINE) tablet 10 mg  10 mg Oral Q6H PRN        Or     prochlorperazine (COMPAZINE) suppository 25 mg  25 mg Rectal Q12H PRN         senna-docusate (SENOKOT-S/PERICOLACE) 8.6-50 MG per tablet 1 tablet  1 tablet Oral BID        Or     senna-docusate (SENOKOT-S/PERICOLACE) 8.6-50 MG per tablet 2 tablet  2 tablet Oral BID         sodium chloride (PF) 0.9% PF flush 3 mL  3 mL Intracatheter Q8H   3 mL at 07/16/23 1429     sodium chloride (PF) 0.9% PF flush 3 mL  3 mL Intracatheter q1 min prn   3 mL at 07/17/23 0845     vancomycin (VANCOCIN) 1000 mg in dextrose 5% 200 mL PREMIX  1,000 mg Intravenous Q12H 200 mL/hr at 07/17/23 0845 1,000 mg at 07/17/23 0845     Current Outpatient Medications Ordered in Epic   Medication     alcohol swab prep pads     blood glucose (NO BRAND SPECIFIED) test strip     blood glucose calibration (NO BRAND SPECIFIED) solution     blood glucose monitoring (NO BRAND SPECIFIED) meter device kit     blood glucose monitoring (NO BRAND SPECIFIED) meter device kit     Sharps Container MISC     thin (NO BRAND SPECIFIED) lancets                  Physical Exam:   Vitals were reviewed  Patient Vitals for the past 8 hrs:   BP Temp Temp src Pulse Resp SpO2 Weight   07/17/23 0730 113/64 98  F (36.7  C) Temporal 78 20 96 % --   07/17/23 0651 -- -- -- -- -- -- 78.9 kg (174 lb)     GEN: NAD, lying in bed  EYES: EOMI  MOUTH: MMM  NECK: Supple  RESP: Unlabored breathing  SKIN: Warm  ABD: soft  NEURO: AAO  URO: Circumcised phallus.  Scrotal  erythema within marked lines.  Scrotal skin is warm only mildly tender with palpation.  No crepitus or eschar.  There is an area of granulation near the anus with no concerning findings.  2 areas of previous bruising appear to have opened up with bloody serosanguineous drainage.           Data:   No results found for: NTBNPI, NTBNP  Lab Results   Component Value Date    WBC 17.6 (H) 07/17/2023    WBC 20.4 (H) 07/16/2023    WBC 30.0 (H) 07/15/2023    HGB 10.3 (L) 07/17/2023    HGB 11.4 (L) 07/16/2023    HGB 14.7 07/15/2023    HCT 30.8 (L) 07/17/2023    HCT 33.6 (L) 07/16/2023    HCT 40.8 07/15/2023    MCV 89 07/17/2023    MCV 87 07/16/2023    MCV 83 07/15/2023     07/17/2023     07/16/2023     07/15/2023     Lab Results   Component Value Date    INR 1.05 04/25/2013     All cultures:  Recent Labs   Lab 07/15/23  1147 07/15/23  0639   CULTURE Culture in progress  1+ Gram negative bacilli*  3+ Streptococcus agalactiae (Group B Streptococcus)* No growth after 1 day  No growth after 1 day

## 2023-07-17 NOTE — CONSULTS
[facility]  Antimicrobial Stewardship Team (AST) Note             To:  Hospitalist  Unit: 603  Patient Name: Ubaldo Zamora  Allergies: NKAs     Brief Summary:  Patient is a 32 year old male with long-standing poorly controlled DM2 and history of DKA and dental infections who presented to [facility] with several days perineal pain radiating to the scrotum and down the inner thighs with associate N/V.  He is found to have a draining perineal cellulitis with sepsis (WBC 30, ANC 25.2) and non-acidotic hyperglycemia with ketosis.  CT Abd/Pelv showed perineal swelling without clear abscess or gas formation in addition to some gas noted in the gallbladder.      Assessment: Tmax = 100.2 (?from 101.4 on 7/15), WBC = 17.6 (?from 30.0 on 7/15). Abscess culture from Perineum is growing 1+ G (-) bacilli, 3+ Group B Strep, & 1+ Klebsiella pneumoniae. Cultures are negative for MRSA.     Current Antibiotic therapy: Zosyn 3.375 grams IV Q6hrs (D3), Vancomycin 1000 mg IV Q12hrs (D3), Clindamycin 900 mf IV q8hrs (D3).    Clinical Features/Vital Signs (VS):       Culture Results:        Imaging:     Recommendation/Interventions:      Highly unlikely needs vancomycin. Consider discontinuing IV vancomycin now or soon.    Discussed w/ ID Staff-Dr Andie Kim, PharmD, [facility]  Pharmacy Clinical Specialist  [phone]

## 2023-07-17 NOTE — PROGRESS NOTES
[facility]  PROGRESS NOTE    July 17, 2023    SUBJECTIVE:  Patient feels about the same as yesterday. No worsening symptoms. Still has some pain and discharge that has remained stable. Tolerating a diet. Afebrile. WBC 17.6.    OBJECTIVE:  Temp:  [98  F (36.7  C)-100.2  F (37.9  C)] 98  F (36.7  C)  Pulse:  [] 78  Resp:  [17-20] 20  BP: (113-154)/(64-84) 113/64  SpO2:  [96 %-100 %] 96 %    Intake/Output Summary (Last 24 hours) at 7/17/2023 1045  Last data filed at 7/16/2023 1258  Gross per 24 hour   Intake 400 ml   Output --   Net 400 ml       GENERAL:  Awake, alert, no acute distress, lying in bed  HEAD: Nomocephalic atraumatic  SCLERA: anicteric  EXTREMITIES: warm and well perfused  RECTAL: In the left anterior position, there is still an area of induration with some associated tenderness. Erythema looks to be improving. No drainage noted at the site. No obvious abscess. Scrotal erythema and tenderness also improving.     LABS:  Lab Results   Component Value Date    WBC 17.6 07/17/2023    WBC 9.8 05/15/2016     Lab Results   Component Value Date    HGB 10.3 07/17/2023    HGB 17.2 05/15/2016     Lab Results   Component Value Date    HCT 30.8 07/17/2023    HCT 47.1 05/15/2016     Lab Results   Component Value Date     07/17/2023     05/15/2016     Last Basic Metabolic Panel:  Lab Results   Component Value Date     07/17/2023     05/20/2016      Lab Results   Component Value Date    POTASSIUM 4.1 07/17/2023    POTASSIUM 3.9 06/01/2022    POTASSIUM 4.4 05/20/2016     Lab Results   Component Value Date    CHLORIDE 103 07/17/2023    CHLORIDE 108 06/01/2022    CHLORIDE 107 05/20/2016     Lab Results   Component Value Date    JACKIE 8.8 07/17/2023    JACKIE 9.1 05/20/2016     Lab Results   Component Value Date    CO2 24 07/17/2023    CO2 26 06/01/2022    CO2 26 05/20/2016     Lab Results   Component Value Date    BUN 11.9 07/17/2023    BUN 15 06/01/2022    BUN 14 05/20/2016     Lab  May Shepard is a 72 y.o.  male and presents with initial immigration physical. No Class A or B conditions. Distant hx of Active TB treatment. No symptoms currently or since treatment. Patient needs MMR and Tdap. Chief Complaint Patient presents with  Physical  
 
Subjective: Additional Concerns: none No Known Allergies No past medical history on file. No past surgical history on file. No family history on file. Social History Substance Use Topics  Smoking status: Not on file  Smokeless tobacco: Not on file  Alcohol use Not on file ROS General: negative for - chills, fatigue, fever, weight change Psych: negative for - anxiety, depression, irritability or mood swings ENT: negative for - headaches, hearing change, nasal congestion, oral lesions, sneezing or sore throat Heme/ Lymph: negative for - bleeding problems, bruising, pallor or swollen lymph nodes Endo: negative for - hot flashes, polydipsia/polyuria or temperature intolerance Resp: negative for - cough, shortness of breath or wheezing CV: negative for - chest pain, edema or palpitations GI: negative for - abdominal pain, change in bowel habits, constipation, diarrhea or nausea/vomiting Objective: 
Vitals:  
 09/20/18 1118 BP: 122/87 Pulse: 78 Resp: 17 Temp: 97.8 °F (36.6 °C) TempSrc: Oral  
SpO2: 98% Weight: 180 lb (81.6 kg) Height: 5' 9\" (1.753 m) PainSc:   0 - No pain PE Alert, well appearing, and in no distress, oriented to person, place, and time and normal appearing weight General appearance - alert, well appearing, and in no distress, oriented to person, place, and time and normal appearing weight Mental status - alert, oriented to person, place, and time, normal mood, behavior, speech, dress, motor activity, and thought processes Chest - clear to auscultation, no wheezes, rales or rhonchi, symmetric air entry Heart - normal rate, regular rhythm, normal S1, S2, no murmurs, rubs, clicks or gallops Extremities - peripheral pulses normal, no pedal edema, no clubbing or cyanosis LABS No results found for any previous visit. TESTS No results found for this or any previous visit. Assessment/Plan: 
 
Immigration physical  
Positive QuantiFERON-TB Gold test - no symptoms. Hx of treated active TB. No Class A or B conditions. - XR CHEST PA LAT; Future - negative - MMR and Tdap documented. Gono and RPR negative. Lab review: orders written for new lab studies as appropriate; see orders. I have discussed the diagnosis with the patient and the intended plan as seen in the above orders. The patient has received an after-visit summary and questions were answered concerning future plans. I have discussed medication side effects and warnings with the patient as well. I have reviewed the plan of care with the patient, accepted their input and they are in agreement with the treatment goals. F/U as needed.   
 
Rocio Romero MD 
 Results   Component Value Date    CR 1.02 07/17/2023    CR 0.66 05/20/2016     Lab Results   Component Value Date     07/17/2023     07/17/2023     06/01/2022     05/20/2016       ASSESSMENT/PLAN:  32 year old male who presented with perineal/scrotal infection, improving on clinical exam. No obvious abscess on exam today.    - Regular diet  - Continue IV antibiotics  - Continue to optimize glucose control  - Serial perineum exams  - Management per primary team  - No plans for surgery      For questions/paging, please contact the CRS office at [phone].    Kaley Severino PA-C  [facility]  Phone: [phone]      [facility] Attending Note    Patient seen and examined independently.  Agree with above assessment and plan.  32-year-old poorly controlled diabetic with hemoglobin A1c of 14.  Scrotal erythema and swelling as well as a sinus near the anal opening.  As well as erythema and ulcerations along the perineum.  White count improving from 30->17 on antibiotics.  Positive BM and flatus today.    Physical exam: There is a ulcerated lesion about 1 x 1 cm a centimeter anterior and to the left of the anal opening.  Small amount of purulent drainage.  No overt fluctuance here.  Anterior to this are 2 other ulcerated lesions with some induration along the base of the scrotum.    Plan:  Poorly controlled diabetic with perineal and scrotal infection.  Would defer to urology regarding operative debridement.  At this point hemodynamically stable on broad-spectrum antibiotics and tight glucose control.  If urology planning surgery we can make ourselves available to assess the perianal component.    Yuki Abebe MD  [facility]  [address], [address]  [place] [address]  T: [phone]  F: [phone]   [email or web address]

## 2023-07-17 NOTE — PLAN OF CARE
Goal Outcome Evaluation:    Pt A/Ox4, VSS on RA x tachycardic. Reporting pain 5-8, given IV dilaudid x2, PO oxycodone x2, IV toradol x2, tylenol x2 with relief per pt. Redness to scrotum, ata area; skin warm to touch, swollen and tender; redness remains in inside outline. IVF w/ KCl @ 150ml/hr w/ intermittent abx: vanco, cleocin, zosyn. Up independently in room, voiding adequately, no BM overnight. Tolerating mod carb diet, q4h B, 157, 194, sliding scale insulin given. Urology, SW following. Per urology note, continue abx, monitor cultures.

## 2023-07-17 NOTE — PLAN OF CARE
"/64 (BP Location: Left arm)   Pulse 78   Temp 98  F (36.7  C) (Temporal)   Resp 20   Ht 1.651 m (5' 5\")   Wt 78.9 kg (174 lb)   SpO2 96%   BMI 28.96 kg/m      Neuro: A/Ox4  Cardiac: WDL  Lungs: LCB  GI: Continent  : Continent-erythema/swollen/sensitive scrotum/perineal area  Pain: 6-8/10, managed with Dilaudid, Oxy, Tylenol, Toradol with minimal relief  IV: 2 PIV on R arm, SL  Meds: Dilaudid, Oxy, Tylenol, Toradol, Mateo patch  Labs/tests: WBC 17.6  Diet: Mod carb  Activity: Independent-walks frequently with girlfriend  Misc: [place]/Urology following, education on at-home glucometer at  needed, BG ACHS w/ CC & sliding scale insulin, WOC consult-wound on scrotum  Plan: Currently resting in bed, able to make need known.     "

## 2023-07-17 NOTE — CONSULTS
[facility] Nurse Inpatient Assessment     Consulted for: scrotum and perineum     Summary: Patient stated he found it as a lump that drained and he reports this happened x 2 days ago    Patient History (according to provider note(s):      Ubaldo Zamora is a 32 year old male with long-standing poorly controlled DM2 and history of DKA and dental infections who presented to [facility] with several days perineal pain radiating to the scrotum and down the inner thighs with associate N/V.  He is found to have a draining perineal cellulitis with sepsis (WBC 30, ANC 25.2) and non-acidotic hyperglycemia with ketosis.  CT Abd/Pelv showed perineal swelling without clear abscess or gas formation in addition to some gas noted in the gallbladder.  Received 1L NS.  Empirically started on Clindamycin, Zosyn, and Vanc.  Urology consulted; will see.  Hospitalists admitting to inpatient.       Perineal cellulitis with sepsis  Meets sepsis criteria based on WBC 30, ANC 25.2.  While patient noted to have a small draining area, CT Abd/Pelv notable for edema but negative for abscess or gas.  Ambulating but having a difficult time sitting or laying.  No recent trauma including poorly fitting clothing (wears boxers).  No history of perineal infections.    Assessment:      Areas visualized during today's visit: Focused: and Perineal area    Wound Location:  Left medial/posterior scrotum and perineal area    Date of last photo 7/17  Wound History: see above   Wound Base: 100 % dermis     Palpation of the wound bed: firm      Drainage: moderate     Description of drainage: serosanguinous     Measurements (length x width x depth, in cm)   Scrotum 3 x 3 x 0.2 cm  Perineum 1.5  x 1.2 cm  x  utd 100% eschar      Tunneling N/A     Undermining N/A  Periwound skin: edematous, irritant dermatitis and macerated      Color: red      Temperature: hot  Odor: mild  Pain: tension to hands, feet and body, burning      Treatment Plan:      Scrotum and perineum  wound(s): Every shift   Cleanse entire scrotum and perineal area with vashe moistened kerlix x 15 minutes  Apply perineal cleanser using todd dry cloths  ICE and elevate as he tolerates     Orders: Written    RECOMMEND PRIMARY TEAM ORDER: None, at this time  Education provided: plan of care, Infection prevention  and Off-loading pressure  Discussed plan of care with: Patient and Nurse  [facility] nurse follow-up plan: weekly  Notify [facility] if wound(s) deteriorate.  Nursing to notify the Provider(s) and re-consult the [facility] Nurse if new skin concern.    DATA:     Current support surface: Standard  Low air loss (CRISSY pump, Isolibrium, Pulsate, skin guard, etc)  Containment of urine/stool: Continent of bladder, Continent of bowel and Incontinent pad in bed  BMI: Body mass index is 28.96 kg/m .   Active diet order: Orders Placed This Encounter      Advance Diet as Tolerated: Fully Advanced to diet(s) per Provider order; Moderate Consistent Carb (60 g CHO per Meal) Diet     Output: No intake/output data recorded.     Labs: Recent Labs   Lab 07/17/23  0847 07/16/23  0548 07/15/23  0550   ALBUMIN  --   --  4.3   HGB 10.3*   < > 14.7   WBC 17.6*   < > 30.0*   A1C  --   --  14.6*    < > = values in this interval not displayed.     Pressure injury risk assessment:   Sensory Perception: 4-->no impairment  Moisture: 4-->rarely moist  Activity: 3-->walks occasionally  Mobility: 4-->no limitation  Nutrition: 3-->adequate  Friction and Shear: 3-->no apparent problem  Chris Score: 21    IAM Salas RN CWOCN  Pager no longer is use, please contact through [email or web address] group: [facility] Nurse [facility]  Department Phone: [phone]

## 2023-07-18 LAB
ANION GAP SERPL CALCULATED.3IONS-SCNC: 10 MMOL/L (ref 7–15)
BACTERIA ABSC ANAEROBE+AEROBE CULT: ABNORMAL
BACTERIA ABSC ANAEROBE+AEROBE CULT: ABNORMAL
BUN SERPL-MCNC: 10.6 MG/DL (ref 6–20)
CALCIUM SERPL-MCNC: 9.1 MG/DL (ref 8.6–10)
CHLORIDE SERPL-SCNC: 101 MMOL/L (ref 98–107)
CREAT SERPL-MCNC: 1.21 MG/DL (ref 0.67–1.17)
DEPRECATED HCO3 PLAS-SCNC: 24 MMOL/L (ref 22–29)
ERYTHROCYTE [DISTWIDTH] IN BLOOD BY AUTOMATED COUNT: 12.1 % (ref 10–15)
GFR SERPL CREATININE-BSD FRML MDRD: 83 ML/MIN/1.73M2
GLUCOSE BLDC GLUCOMTR-MCNC: 139 MG/DL (ref 70–99)
GLUCOSE BLDC GLUCOMTR-MCNC: 151 MG/DL (ref 70–99)
GLUCOSE BLDC GLUCOMTR-MCNC: 168 MG/DL (ref 70–99)
GLUCOSE BLDC GLUCOMTR-MCNC: 170 MG/DL (ref 70–99)
GLUCOSE BLDC GLUCOMTR-MCNC: 209 MG/DL (ref 70–99)
GLUCOSE SERPL-MCNC: 166 MG/DL (ref 70–99)
HCT VFR BLD AUTO: 31.6 % (ref 40–53)
HGB BLD-MCNC: 11 G/DL (ref 13.3–17.7)
MCH RBC QN AUTO: 30 PG (ref 26.5–33)
MCHC RBC AUTO-ENTMCNC: 34.8 G/DL (ref 31.5–36.5)
MCV RBC AUTO: 86 FL (ref 78–100)
PLATELET # BLD AUTO: 251 10E3/UL (ref 150–450)
POTASSIUM SERPL-SCNC: 3.7 MMOL/L (ref 3.4–5.3)
RBC # BLD AUTO: 3.67 10E6/UL (ref 4.4–5.9)
SODIUM SERPL-SCNC: 135 MMOL/L (ref 136–145)
WBC # BLD AUTO: 14 10E3/UL (ref 4–11)

## 2023-07-18 PROCEDURE — 36415 COLL VENOUS BLD VENIPUNCTURE: CPT | Performed by: PHYSICIAN ASSISTANT

## 2023-07-18 PROCEDURE — 80048 BASIC METABOLIC PNL TOTAL CA: CPT | Performed by: PHYSICIAN ASSISTANT

## 2023-07-18 PROCEDURE — 85014 HEMATOCRIT: CPT | Performed by: PHYSICIAN ASSISTANT

## 2023-07-18 PROCEDURE — 250N000013 HC RX MED GY IP 250 OP 250 PS 637: Performed by: INTERNAL MEDICINE

## 2023-07-18 PROCEDURE — 250N000011 HC RX IP 250 OP 636: Mod: JZ | Performed by: PHYSICIAN ASSISTANT

## 2023-07-18 PROCEDURE — 99233 SBSQ HOSP IP/OBS HIGH 50: CPT | Performed by: INTERNAL MEDICINE

## 2023-07-18 PROCEDURE — 120N000001 HC R&B MED SURG/OB

## 2023-07-18 PROCEDURE — 250N000013 HC RX MED GY IP 250 OP 250 PS 637: Performed by: PHYSICIAN ASSISTANT

## 2023-07-18 RX ADMIN — ACETAMINOPHEN 1000 MG: 500 TABLET, FILM COATED ORAL at 01:36

## 2023-07-18 RX ADMIN — PIPERACILLIN SODIUM AND TAZOBACTAM SODIUM 3.38 G: 3; .375 INJECTION, SOLUTION INTRAVENOUS at 17:31

## 2023-07-18 RX ADMIN — KETOROLAC TROMETHAMINE 15 MG: 15 INJECTION, SOLUTION INTRAMUSCULAR; INTRAVENOUS at 01:29

## 2023-07-18 RX ADMIN — Medication 3 MG: at 22:05

## 2023-07-18 RX ADMIN — PIPERACILLIN SODIUM AND TAZOBACTAM SODIUM 3.38 G: 3; .375 INJECTION, SOLUTION INTRAVENOUS at 04:29

## 2023-07-18 RX ADMIN — CLINDAMYCIN PHOSPHATE 900 MG: 900 INJECTION, SOLUTION INTRAVENOUS at 10:42

## 2023-07-18 RX ADMIN — KETOROLAC TROMETHAMINE 15 MG: 15 INJECTION, SOLUTION INTRAMUSCULAR; INTRAVENOUS at 20:00

## 2023-07-18 RX ADMIN — KETOROLAC TROMETHAMINE 15 MG: 15 INJECTION, SOLUTION INTRAMUSCULAR; INTRAVENOUS at 08:30

## 2023-07-18 RX ADMIN — SENNOSIDES AND DOCUSATE SODIUM 1 TABLET: 50; 8.6 TABLET ORAL at 20:00

## 2023-07-18 RX ADMIN — PIPERACILLIN SODIUM AND TAZOBACTAM SODIUM 3.38 G: 3; .375 INJECTION, SOLUTION INTRAVENOUS at 10:41

## 2023-07-18 RX ADMIN — ACETAMINOPHEN 1000 MG: 500 TABLET, FILM COATED ORAL at 12:15

## 2023-07-18 RX ADMIN — CLINDAMYCIN PHOSPHATE 900 MG: 900 INJECTION, SOLUTION INTRAVENOUS at 01:43

## 2023-07-18 RX ADMIN — LISINOPRIL 20 MG: 20 TABLET ORAL at 08:24

## 2023-07-18 RX ADMIN — ACETAMINOPHEN 500 MG: 500 TABLET, FILM COATED ORAL at 20:00

## 2023-07-18 RX ADMIN — CLINDAMYCIN PHOSPHATE 900 MG: 900 INJECTION, SOLUTION INTRAVENOUS at 18:09

## 2023-07-18 RX ADMIN — OXYCODONE HYDROCHLORIDE 10 MG: 5 TABLET ORAL at 23:22

## 2023-07-18 RX ADMIN — OXYCODONE HYDROCHLORIDE 5 MG: 5 TABLET ORAL at 05:56

## 2023-07-18 RX ADMIN — OXYCODONE HYDROCHLORIDE 10 MG: 5 TABLET ORAL at 12:15

## 2023-07-18 RX ADMIN — ONDANSETRON 4 MG: 2 INJECTION INTRAMUSCULAR; INTRAVENOUS at 01:26

## 2023-07-18 RX ADMIN — NICOTINE 1 PATCH: 21 PATCH, EXTENDED RELEASE TRANSDERMAL at 08:22

## 2023-07-18 ASSESSMENT — ACTIVITIES OF DAILY LIVING (ADL)
ADLS_ACUITY_SCORE: 23
ADLS_ACUITY_SCORE: 23
ADLS_ACUITY_SCORE: 20
ADLS_ACUITY_SCORE: 23
ADLS_ACUITY_SCORE: 23
ADLS_ACUITY_SCORE: 20

## 2023-07-18 NOTE — PROGRESS NOTES
Urology Progress Note    Name: Ubaldo Zamora    MRN: [id number]   YOB: 1990  Date of Admission: 7/15/2023              Impression and Plan:   Impression / Plan:   Ubaldo Zamora is a 32 year old male with perineal cellulitis      -Continue IV abx tailoring to C&S.  -Monitor for worsening leukocytosis, ecchymosis, fevers, crepitus, eschar.   -Recommend repeat imaging to evaluate for abscess if    condition declines  -Stressed with the patient about the need to work on diabetes and establish with primary care provider as well as possible endocrinology due to concern for recurrent infections with poorly controlled blood sugar    Will discuss with Dr. Alarcon    Thank you for the opportunity to participate in the care of Ubaldo Zamora.     ADAM Trujillo, CNP  [facility] - Department of Urology  [phone]            Interval History:   Ubaldo Zamora is a 32 year old male with cellulitis of the perineum and poorly controlled diabetes. WBC count improving today, down to 14 from 17.6. Patient reports pain somewhat improved from yesterday and feels more sore now. Swelling and erythema persist. Hemoglobin A1c of 14.6%. No crepitus or eschar. Patient is on IV Abx. Afebrile, 98.6 F.    History is obtained from patient & EMR.          Past Medical History:     Past Medical History:   Diagnosis Date     Diabetes (H)     type 2 diabetes     Diabetes mellitus type 2 with complications (H)     history of DKA, recurring infections            Past Surgical History:     Past Surgical History:   Procedure Laterality Date     GENITOURINARY SURGERY      scrotum     TONSILLECTOMY, ADENOIDECTOMY, COMBINED              Social History:     Social History     Tobacco Use     Smoking status: Every Day     Packs/day: 1.00     Types: Cigarettes     Smokeless tobacco: Never   Substance Use Topics     Alcohol use: No     Alcohol/week: 0.0 standard drinks of alcohol            Family History:     Family History    Problem Relation Age of Onset     Depression Mother      Coronary Artery Disease Mother      Heart Disease Mother      Diabetes Mother      Hypertension Mother      Stomach Problem Mother      Obesity Mother      Depression Father      Hypertension Father      Alcoholism Father      Hyperlipidemia Father      Mental Illness Father      Asthma Father      Depression Maternal Grandmother      Hyperlipidemia Maternal Grandmother      Colon Cancer Maternal Grandmother      Diabetes Maternal Grandmother      Depression Maternal Grandfather      Hyperlipidemia Maternal Grandfather      Coronary Artery Disease Maternal Grandfather      Diabetes Maternal Grandfather      Hyperlipidemia Paternal Grandmother      Coronary Artery Disease Paternal Grandmother      Diabetes Paternal Grandmother      Diabetes Paternal Grandfather             Allergies:   No Known Allergies         Medications:     Current Facility-Administered Medications   Medication     acetaminophen (TYLENOL) tablet 500-1,000 mg     bisacodyl (DULCOLAX) suppository 10 mg     clindamycin (CLEOCIN) 900 mg in 50 mL NS intermittent infusion     glucose gel 15-30 g    Or     dextrose 50 % injection 25-50 mL    Or     glucagon injection 1 mg     glucose gel 15-30 g    Or     dextrose 50 % injection 25-50 mL    Or     glucagon injection 1 mg     HYDROmorphone (PF) (DILAUDID) injection 0.5-1 mg     insulin aspart (NovoLOG) injection (RAPID ACTING)     insulin aspart (NovoLOG) injection (RAPID ACTING)     insulin aspart (NovoLOG) injection (RAPID ACTING)     insulin glargine (LANTUS PEN) injection 20 Units     ketorolac (TORADOL) injection 15 mg     lidocaine (LMX4) cream     lidocaine 1 % 0.1-1 mL     lisinopril (ZESTRIL) tablet 20 mg     magnesium hydroxide (MILK OF MAGNESIA) suspension 30 mL     melatonin tablet 3 mg     naloxone (NARCAN) injection 0.2 mg    Or     naloxone (NARCAN) injection 0.4 mg    Or     naloxone (NARCAN) injection 0.2 mg    Or     naloxone  (NARCAN) injection 0.4 mg     nicotine (COMMIT) lozenge 2-4 mg     nicotine (NICODERM CQ) 21 MG/24HR 24 hr patch 1 patch     nicotine Patch in Place     ondansetron (ZOFRAN ODT) ODT tab 4 mg    Or     ondansetron (ZOFRAN) injection 4 mg     oxyCODONE (ROXICODONE) tablet 5-10 mg     piperacillin-tazobactam (ZOSYN) intermittent infusion 3.375 g     prochlorperazine (COMPAZINE) injection 10 mg    Or     prochlorperazine (COMPAZINE) tablet 10 mg    Or     prochlorperazine (COMPAZINE) suppository 25 mg     senna-docusate (SENOKOT-S/PERICOLACE) 8.6-50 MG per tablet 1 tablet    Or     senna-docusate (SENOKOT-S/PERICOLACE) 8.6-50 MG per tablet 2 tablet     sodium chloride (PF) 0.9% PF flush 3 mL     sodium chloride (PF) 0.9% PF flush 3 mL             Review of Systems:    ROS: See HPI for pertinent details.  Remainder of 10-point ROS negative.         Physical Exam:   VS:  T: 98.6    HR: 86    BP: 139/86    RR: 16     GEN:  Alert.  NAD. Pt is not diaphoretic.   HEENT:  Sclerae anicteric.    CV:  No obvious jugular venous distension  LUNGS: No respiratory distress, breathing comfortably wo accessory muscle use.  ABD:  Soft.  NT.  ND.  No rebound or guarding.      :  Marked scrotal swelling and erythema. No crepitus or eschar. 2 areas of previous bruising appear to have opened, no drainage.  EXT:  Warm, well perfused.  SKIN:  Warm.  Dry.   NEURO:  CN grossly intact.               Data:   All laboratory data reviewed:    No results found for: PSA  Recent Labs   Lab 07/18/23  0837 07/17/23  0847 07/16/23  0548 07/15/23  0550   WBC 14.0* 17.6* 20.4* 30.0*   HGB 11.0* 10.3* 11.4* 14.7    199 210 240     Recent Labs   Lab 07/18/23  0837 07/18/23  0755 07/18/23  0128 07/17/23  2120 07/17/23  1208 07/17/23  0847 07/16/23  0608 07/16/23  0548 07/15/23  0749 07/15/23  0550   *  --   --   --   --  135*  --  132*  --  131*   POTASSIUM 3.7  --   --   --   --  4.1  --  3.8  --  3.9   CHLORIDE 101  --   --   --   --  103   --  98  --  92*   CO2 24  --   --   --   --  24  --  21*  --  24   BUN 10.6  --   --   --   --  11.9  --  10.6  --  13.6   CR 1.21*  --   --   --   --  1.02  --  0.88  --  1.01   * 170* 168* 196*   < > 184*  200*   < > 214*   < > 533*   JACKIE 9.1  --   --   --   --  8.8  --  8.5*  --  9.7    < > = values in this interval not displayed.     Recent Labs   Lab 07/15/23  0651   COLOR Light Yellow   APPEARANCE Clear   URINEGLC >=1000*   URINEBILI Negative   URINEKETONE 20*   SG 1.034   URINEPH 5.5   PROTEIN 30*   NITRITE Negative   LEUKEST Negative   RBCU 22*   WBCU <1       Abscess Aerobic Bacterial Culture 1+ Klebsiella pneumoniae Abnormal        3+ Streptococcus agalactiae (Group B Streptococcus) Abnormal     This organism is susceptible to ampicillin, penicillin, vancomycin and the cephalosporins. If treatment is required and your patient is allergic to penicillin, contact the microbiology lab within 5 days to request susceptibility testing.        Resulting Agency: [facility]     Susceptibility     Klebsiella pneumoniae     JOAO     Ampicillin >=32 ug/mL Resistant 1     Ampicillin/ Sulbactam 8 ug/mL Susceptible     Cefepime <=1 ug/mL Susceptible     Ceftazidime <=1 ug/mL Susceptible     Ceftriaxone <=1 ug/mL Susceptible     Ciprofloxacin <=0.25 ug/mL Susceptible     Gentamicin <=1 ug/mL Susceptible     Levofloxacin <=0.12 ug/mL Susceptible     Meropenem <=0.25 ug/mL Susceptible     Piperacillin/Tazobactam 8 ug/mL Susceptible     Tobramycin <=1 ug/mL Susceptible     Trimethoprim/Sulfamethoxazole <=1/19 ug/mL Susceptible

## 2023-07-18 NOTE — PLAN OF CARE
"        Alert and oriented x4  Independent in room  Girlfriend prsent in room  Pain managed with Acetaminophen, Dilaudid, Oxycodone  PIV x2      WBC 17.6    education on at-home glucometer at  needed      /70 (BP Location: Left arm)   Pulse 83   Temp 97.3  F (36.3  C) (Temporal)   Resp 18   Ht 1.651 m (5' 5\")   Wt 78.9 kg (174 lb)   SpO2 96%   BMI 28.96 kg/m        "

## 2023-07-18 NOTE — PROGRESS NOTES
[facility]  Hospitalist Progress Note  Douglas Mccoy MD 07/18/2023    Reason for Stay (Diagnosis): Scrotal/perineal cellulitis         Assessment and Plan:      Summary of Stay: Ubaldo Zamora is a 32 year old male with long-standing poorly controlled DM2 and history of DKA and dental infections who presented to [facility] with several days perineal pain radiating to the scrotum and down the inner thighs with associate N/V.  He is found to have a draining perineal cellulitis with sepsis (WBC 30, ANC 25.2) and non-acidotic hyperglycemia with ketosis.  CT Abd/Pelv showed perineal swelling without clear abscess or gas formation in addition to some gas noted in the gallbladder.  Empirically started on Clindamycin, Zosyn, and Vanc.  Urology/colorectal surgery consulted.  At this time surgery is not felt to be indicated and patient stable/improved with IV antibiotics.  No fever and WBC count is improving     Plans today:  - continue IV Zosyn and IV clindamycin  - increase Lantus to 23 units daily  - appreciate CRS and urology input      Perineal cellulitis with sepsis  - CT Abd/Pelv notable for edema but negative for abscess or gas. - Seen by both urology and colorectal surgery and not needing any surgical exploration at this point.    - Culture was taken by urology exploring the wound and is growing klebsiella and strep species   - treated with IV vanco (7/15-7/17)  - treated with IV Zosyn and IV clinda (7/15-present)      Longstanding poorly controlled DM2 complicated by recurring infections and medication noncompliance  - HgbA1c 14 with poor management  - started on Lantus and Novolog insulin this admit with improved control.    - Does not have a glucometer at home.  Glucometer with associated test strips, lancets, alcohol swabs, and sharps container ordered in the discharge navigator.  Additionally, endocrinology and diabetes educator referrals also placed in the discharge navigator.  Social work to  "see about financial issues.  A long discussion about the consequences of uncontrolled diabetes and the long-term including renal failure, blindness, impotence, etc was had with patient by several providers (myself included)         HTN  - continue on lisinopril.           Tobacco dependence  Smokes ~1ppd.  Agreeable to nicotine replacement.  Tobacco cessation strongly encouraged, in part because of his underlying DM and predisposition to ASCVD (mother had early cardiac disease).      Noncompliance/financial issues/no insurance:  - Patient has been noncompliant with taking care of himself due to the fact he has no insurance.    - SW consulted     Clinically Significant Risk Factors Present on Admission          # Hypertension: Home medication list includes antihypertensive(s)      # Overweight: Estimated body mass index is 26.63 kg/m  as calculated from the following:    Height as of this encounter: 1.651 m (5' 5\").    Weight as of this encounter: 72.6 kg (160 lb).             Diet: Advance Diet as Tolerated: Fully Advanced to diet(s) per Provider order; Moderate Consistent Carb (60 g CHO per Meal) Diet    DVT Prophylaxis: Ambulate every shift  Boss Catheter: Not present  Lines: None     Cardiac Monitoring: None  DISPO:  Home anticipated after adequate IV antibiotic course and improvement in cellulitis; at least another 2-3 days in the hospital anticipated             Interval History (Subjective):      Pain improved.  Still notes significant scrotal swelling                  Physical Exam:      Last Vital Signs:  /86 (BP Location: Left arm)   Pulse 86   Temp 98.6  F (37  C) (Temporal)   Resp 16   Ht 1.651 m (5' 5\")   Wt 78.9 kg (174 lb)   SpO2 97%   BMI 28.96 kg/m        Intake/Output Summary (Last 24 hours) at 7/18/2023 5411  Last data filed at 7/18/2023 0138  Gross per 24 hour   Intake 3 ml   Output 100 ml   Net -97 ml       Constitutional: Awake, alert, cooperative, no apparent distress "   Respiratory: Clear to auscultation bilaterally, no crackles or wheezing   Cardiovascular: Regular rate and rhythm, normal S1 and S2, and no murmur noted   Abdomen: Normal bowel sounds, soft, non-distended, non-tender   Skin: No rashes, no cyanosis, dry to touch   Neuro: Alert and oriented x3, no weakness, numbness, memory loss   Extremities: No edema, normal range of motion   Other(s): redness receding from previous outline in perineum.  Scrotum remains enlarged but stable in size.  Less induration of perineum.  Shallow ulcers of scrotum/perineum unchanged       All other systems: Negative          Medications:      All current medications were reviewed with changes reflected in problem list.         Data:      All new lab and imaging data was reviewed.   Labs:  Recent Labs   Lab 07/18/23  1009 07/18/23  0837 07/18/23  0755 07/17/23  1208 07/17/23  0847 07/16/23  0608 07/16/23  0548   NA  --  135*  --   --  135*  --  132*   POTASSIUM  --  3.7  --   --  4.1  --  3.8   CHLORIDE  --  101  --   --  103  --  98   CO2  --  24  --   --  24  --  21*   ANIONGAP  --  10  --   --  8  --  13   * 166* 170*   < > 184*  200*   < > 214*   BUN  --  10.6  --   --  11.9  --  10.6   CR  --  1.21*  --   --  1.02  --  0.88   GFRESTIMATED  --  83  --   --  >90  --  >90   JACKIE  --  9.1  --   --  8.8  --  8.5*    < > = values in this interval not displayed.     Recent Labs   Lab 07/18/23  0837   WBC 14.0*   HGB 11.0*   HCT 31.6*   MCV 86         Imaging:   No results found for this or any previous visit (from the past 24 hour(s)).

## 2023-07-18 NOTE — PROGRESS NOTES
[facility]  PROGRESS NOTE    July 18, 2023    SUBJECTIVE:  Patient feels slightly improved. Still with pain and discharge. Having BM's and flatus. WBC down trending to 14.0.    OBJECTIVE:  Temp:  [97.3  F (36.3  C)-98.8  F (37.1  C)] 98.6  F (37  C)  Pulse:  [] 86  Resp:  [16-20] 16  BP: (134-172)/(70-95) 139/86  SpO2:  [96 %-99 %] 97 %    Intake/Output Summary (Last 24 hours) at 7/18/2023 1225  Last data filed at 7/18/2023 0138  Gross per 24 hour   Intake 3 ml   Output 100 ml   Net -97 ml       GENERAL:  Awake, alert, no acute distress, sitting up in bed  HEAD: Nomocephalic atraumatic  SCLERA: anicteric  EXTREMITIES: warm and well perfused  RECTAL: In the left anterior there is an ulcerated lesion with some purulent discharge and exudate. When probed, this seemed more superficial and tracked anteriorly. There is another lesion more anterior than this with some induration. Scrotal swelling and edema.    LABS:  Lab Results   Component Value Date    WBC 14.0 07/18/2023    WBC 9.8 05/15/2016     Lab Results   Component Value Date    HGB 11.0 07/18/2023    HGB 17.2 05/15/2016     Lab Results   Component Value Date    HCT 31.6 07/18/2023    HCT 47.1 05/15/2016     Lab Results   Component Value Date     07/18/2023     05/15/2016     Last Basic Metabolic Panel:  Lab Results   Component Value Date     07/18/2023     05/20/2016      Lab Results   Component Value Date    POTASSIUM 3.7 07/18/2023    POTASSIUM 3.9 06/01/2022    POTASSIUM 4.4 05/20/2016     Lab Results   Component Value Date    CHLORIDE 101 07/18/2023    CHLORIDE 108 06/01/2022    CHLORIDE 107 05/20/2016     Lab Results   Component Value Date    JACKIE 9.1 07/18/2023    JACKIE 9.1 05/20/2016     Lab Results   Component Value Date    CO2 24 07/18/2023    CO2 26 06/01/2022    CO2 26 05/20/2016     Lab Results   Component Value Date    BUN 10.6 07/18/2023    BUN 15 06/01/2022    BUN 14 05/20/2016     Lab Results   Component  Value Date    CR 1.21 07/18/2023    CR 0.66 05/20/2016     Lab Results   Component Value Date     07/18/2023     07/18/2023     06/01/2022     05/20/2016       ASSESSMENT/PLAN:  32 year old male who presented with perineal/scrotal infection.     - Regular diet  - Continue IV antibiotics  - Continue to optimize glucose control  - Defer to urology regarding operative debridement. If urology planning surgery we can make ourselves available to assess the perianal component.    For questions/paging, please contact the CRS office at [phone].    Kaley Severino PA-C  [facility]  Phone: [phone]      [facility] Staff  I performed a history and physical examination of the patient and discussed their management with the physician assistant. I reviewed the physician assistants note and agree with the documented findings and plan of care.     Albina Vale MD, FACS    [facility]  [address] [address]  T: [phone]  F: [phone]

## 2023-07-18 NOTE — PLAN OF CARE
Goal Outcome Evaluation:      Plan of Care Reviewed With: patient, significant other    Overall Patient Progress: no changeOverall Patient Progress: no change    A/Ox4, VSS except elevated BP, on RA. Severe pain episode controlled with toradol and Tylenol. Pt had an episode of emesis which was undigested food. PRN zofran given. . Scrotal edema 3-4+. Voiding adequately. [facility] out of Vashe for wound cares, pt used perineal cleanser and soft wipes independently. Plan to go home when abx course finished.

## 2023-07-18 NOTE — PLAN OF CARE
Goal Outcome Evaluation:      Plan of Care Reviewed With: patient, significant other    Overall Patient Progress: improvingOverall Patient Progress: improving       A/Ox4, VSS except elevated BP, on RA. Philippe reg diet, no n/v this shift. Voiding. Sugar checks, lantus and aspart administered. PRN tylenol, oxy and toradol for pain. Scrotal edema 3-4+. Pt had shower this shift. Vashe requested from [facility], has been out of stock. Wound wash and soft wipes provided, pt is independently cleansing wounds. Girlfriend was assisting at the bedside this morning. IV Zosyn and Clindamycin. Plan to go home when abx course finished.

## 2023-07-19 LAB
ANION GAP SERPL CALCULATED.3IONS-SCNC: 10 MMOL/L (ref 7–15)
BUN SERPL-MCNC: 7.8 MG/DL (ref 6–20)
CALCIUM SERPL-MCNC: 8.9 MG/DL (ref 8.6–10)
CHLORIDE SERPL-SCNC: 100 MMOL/L (ref 98–107)
CREAT SERPL-MCNC: 1.1 MG/DL (ref 0.67–1.17)
DEPRECATED HCO3 PLAS-SCNC: 25 MMOL/L (ref 22–29)
ERYTHROCYTE [DISTWIDTH] IN BLOOD BY AUTOMATED COUNT: 12.2 % (ref 10–15)
GFR SERPL CREATININE-BSD FRML MDRD: >90 ML/MIN/1.73M2
GLUCOSE BLDC GLUCOMTR-MCNC: 168 MG/DL (ref 70–99)
GLUCOSE BLDC GLUCOMTR-MCNC: 176 MG/DL (ref 70–99)
GLUCOSE BLDC GLUCOMTR-MCNC: 199 MG/DL (ref 70–99)
GLUCOSE BLDC GLUCOMTR-MCNC: 237 MG/DL (ref 70–99)
GLUCOSE BLDC GLUCOMTR-MCNC: 357 MG/DL (ref 70–99)
GLUCOSE SERPL-MCNC: 346 MG/DL (ref 70–99)
HCT VFR BLD AUTO: 28.9 % (ref 40–53)
HGB BLD-MCNC: 9.8 G/DL (ref 13.3–17.7)
MCH RBC QN AUTO: 29.3 PG (ref 26.5–33)
MCHC RBC AUTO-ENTMCNC: 33.9 G/DL (ref 31.5–36.5)
MCV RBC AUTO: 87 FL (ref 78–100)
PLATELET # BLD AUTO: 259 10E3/UL (ref 150–450)
POTASSIUM SERPL-SCNC: 3.5 MMOL/L (ref 3.4–5.3)
RBC # BLD AUTO: 3.34 10E6/UL (ref 4.4–5.9)
SODIUM SERPL-SCNC: 135 MMOL/L (ref 136–145)
WBC # BLD AUTO: 10.7 10E3/UL (ref 4–11)

## 2023-07-19 PROCEDURE — 36415 COLL VENOUS BLD VENIPUNCTURE: CPT | Performed by: PHYSICIAN ASSISTANT

## 2023-07-19 PROCEDURE — 120N000001 HC R&B MED SURG/OB

## 2023-07-19 PROCEDURE — 85027 COMPLETE CBC AUTOMATED: CPT | Performed by: PHYSICIAN ASSISTANT

## 2023-07-19 PROCEDURE — 99231 SBSQ HOSP IP/OBS SF/LOW 25: CPT | Performed by: UROLOGY

## 2023-07-19 PROCEDURE — 250N000011 HC RX IP 250 OP 636: Performed by: PHYSICIAN ASSISTANT

## 2023-07-19 PROCEDURE — 250N000013 HC RX MED GY IP 250 OP 250 PS 637: Performed by: PHYSICIAN ASSISTANT

## 2023-07-19 PROCEDURE — 80048 BASIC METABOLIC PNL TOTAL CA: CPT | Performed by: PHYSICIAN ASSISTANT

## 2023-07-19 PROCEDURE — 250N000013 HC RX MED GY IP 250 OP 250 PS 637: Performed by: INTERNAL MEDICINE

## 2023-07-19 PROCEDURE — 99233 SBSQ HOSP IP/OBS HIGH 50: CPT | Performed by: INTERNAL MEDICINE

## 2023-07-19 RX ADMIN — OXYCODONE HYDROCHLORIDE 10 MG: 5 TABLET ORAL at 09:59

## 2023-07-19 RX ADMIN — HYDROMORPHONE HYDROCHLORIDE 0.5 MG: 1 INJECTION, SOLUTION INTRAMUSCULAR; INTRAVENOUS; SUBCUTANEOUS at 07:48

## 2023-07-19 RX ADMIN — HYDROMORPHONE HYDROCHLORIDE 1 MG: 1 INJECTION, SOLUTION INTRAMUSCULAR; INTRAVENOUS; SUBCUTANEOUS at 20:41

## 2023-07-19 RX ADMIN — HYDROMORPHONE HYDROCHLORIDE 0.5 MG: 1 INJECTION, SOLUTION INTRAMUSCULAR; INTRAVENOUS; SUBCUTANEOUS at 02:01

## 2023-07-19 RX ADMIN — PIPERACILLIN SODIUM AND TAZOBACTAM SODIUM 3.38 G: 3; .375 INJECTION, SOLUTION INTRAVENOUS at 19:16

## 2023-07-19 RX ADMIN — CLINDAMYCIN PHOSPHATE 900 MG: 900 INJECTION, SOLUTION INTRAVENOUS at 10:05

## 2023-07-19 RX ADMIN — CLINDAMYCIN PHOSPHATE 900 MG: 900 INJECTION, SOLUTION INTRAVENOUS at 01:49

## 2023-07-19 RX ADMIN — OXYCODONE HYDROCHLORIDE 10 MG: 5 TABLET ORAL at 04:43

## 2023-07-19 RX ADMIN — OXYCODONE HYDROCHLORIDE 10 MG: 5 TABLET ORAL at 18:44

## 2023-07-19 RX ADMIN — PIPERACILLIN SODIUM AND TAZOBACTAM SODIUM 3.38 G: 3; .375 INJECTION, SOLUTION INTRAVENOUS at 23:40

## 2023-07-19 RX ADMIN — LISINOPRIL 20 MG: 20 TABLET ORAL at 07:48

## 2023-07-19 RX ADMIN — PIPERACILLIN SODIUM AND TAZOBACTAM SODIUM 3.38 G: 3; .375 INJECTION, SOLUTION INTRAVENOUS at 12:09

## 2023-07-19 RX ADMIN — PIPERACILLIN SODIUM AND TAZOBACTAM SODIUM 3.38 G: 3; .375 INJECTION, SOLUTION INTRAVENOUS at 00:15

## 2023-07-19 RX ADMIN — HYDROMORPHONE HYDROCHLORIDE 1 MG: 1 INJECTION, SOLUTION INTRAMUSCULAR; INTRAVENOUS; SUBCUTANEOUS at 18:03

## 2023-07-19 RX ADMIN — ACETAMINOPHEN 500 MG: 500 TABLET, FILM COATED ORAL at 22:07

## 2023-07-19 RX ADMIN — OXYCODONE HYDROCHLORIDE 5 MG: 5 TABLET ORAL at 14:07

## 2023-07-19 RX ADMIN — HYDROMORPHONE HYDROCHLORIDE 1 MG: 1 INJECTION, SOLUTION INTRAMUSCULAR; INTRAVENOUS; SUBCUTANEOUS at 12:11

## 2023-07-19 RX ADMIN — KETOROLAC TROMETHAMINE 15 MG: 15 INJECTION, SOLUTION INTRAMUSCULAR; INTRAVENOUS at 20:47

## 2023-07-19 RX ADMIN — PIPERACILLIN SODIUM AND TAZOBACTAM SODIUM 3.38 G: 3; .375 INJECTION, SOLUTION INTRAVENOUS at 06:08

## 2023-07-19 RX ADMIN — CLINDAMYCIN PHOSPHATE 900 MG: 900 INJECTION, SOLUTION INTRAVENOUS at 18:03

## 2023-07-19 ASSESSMENT — ACTIVITIES OF DAILY LIVING (ADL)
ADLS_ACUITY_SCORE: 23

## 2023-07-19 NOTE — PLAN OF CARE
Goal Outcome Evaluation:  Vital signs stable.  Swelling, redness to scrotum and drainage from perineal area,.Dressing done per WOC recommendations.  Pain controlled with tylenol, iv tordol.  Blood glucose monitored.  Pt on iv zosyn and clindamycin.    Plan of Care Reviewed With: patient

## 2023-07-19 NOTE — PROGRESS NOTES
[facility] Urology Progress Note          Assessment and Plan:     Assessment:    Calculus of gallbladder without cholecystitis without obstruction    Cellulitis of perineum    Hyperglycemia    Poorly controlled diabetes mellitus (H)      Plan:   -Continue IV antibiotics.  Transition to culture specific as available and oral when clinically appropriate.  -Continue to monitor leukocytosis-now resolved.  -Would recommend continuing with serial examinations to look for any concerning changes.  -If lack of improvement on conservative management with antibiotics, have low threshold for repeat imaging to look for areas amendable to aspiration or incision and drainage.  -Continue to work on diabetic control.  Stressed with the patient about the need to work on diabetes and establish with primary care provider as well as possible endocrinology due to concern for recurrent infections with poorly controlled blood sugar.  -Continue with dressing changes per WOC.  -We will continue to follow along.  Anticipate discharge on PO antibiotics in 1-2 days.    Gay Mcclellan PA-C   [facility] Urology  [phone]               Interval History:     Doing better. Denies N/V/F/C/SOB/CP.  Significant other at bedside.  Scrotal swelling persists.  Appears to be just slightly increased due to penis being more retracted into the swelling.  The area near the anus does show some granulation tissue as does the area in the middle of the perineum.  At the base of the scrotum, there appears to be superficial serosanguineous drainage.  No eschar or crepitus noted.  Leukocytosis resolved, WBC 10.7.  Hemoglobin 9.9.  Patient is afebrile without tachycardia.  On IV clindamycin and Zosyn.  Culture showed 3+ group B strep and 1+ Klebsiella pneumoniae.            Review of Systems:     The 5 point Review of Systems is negative other than noted in the HPI             Medications:     Current Facility-Administered Medications Ordered in  Epic   Medication Dose Route Frequency Last Rate Last Admin     acetaminophen (TYLENOL) tablet 500-1,000 mg  500-1,000 mg Oral 4x Daily PRN   500 mg at 07/18/23 2000     bisacodyl (DULCOLAX) suppository 10 mg  10 mg Rectal Daily PRN         clindamycin (CLEOCIN) 900 mg in 50 mL NS intermittent infusion  900 mg Intravenous Q8H   900 mg at 07/19/23 1005     glucose gel 15-30 g  15-30 g Oral Q15 Min PRN        Or     dextrose 50 % injection 25-50 mL  25-50 mL Intravenous Q15 Min PRN        Or     glucagon injection 1 mg  1 mg Subcutaneous Q15 Min PRN         glucose gel 15-30 g  15-30 g Oral Q15 Min PRN        Or     dextrose 50 % injection 25-50 mL  25-50 mL Intravenous Q15 Min PRN        Or     glucagon injection 1 mg  1 mg Subcutaneous Q15 Min PRN         HYDROmorphone (PF) (DILAUDID) injection 0.5-1 mg  0.5-1 mg Intravenous Q2H PRN   1 mg at 07/19/23 1211     insulin aspart (NovoLOG) injection (RAPID ACTING)  1-7 Units Subcutaneous TID AC   2 Units at 07/19/23 1000     insulin aspart (NovoLOG) injection (RAPID ACTING)  1-5 Units Subcutaneous At Bedtime   1 Units at 07/18/23 2205     insulin aspart (NovoLOG) injection (RAPID ACTING)   Subcutaneous TID AC   Given at 07/19/23 1000     insulin glargine (LANTUS PEN) injection 23 Units  23 Units Subcutaneous QAM AC   23 Units at 07/19/23 1001     ketorolac (TORADOL) injection 15 mg  15 mg Intravenous Q6H PRN   15 mg at 07/18/23 2000     lidocaine (LMX4) cream   Topical Q1H PRN         lidocaine 1 % 0.1-1 mL  0.1-1 mL Other Q1H PRN         lisinopril (ZESTRIL) tablet 20 mg  20 mg Oral Daily   20 mg at 07/19/23 0748     magnesium hydroxide (MILK OF MAGNESIA) suspension 30 mL  30 mL Oral Daily PRN         melatonin tablet 3 mg  3 mg Oral At Bedtime PRN   3 mg at 07/18/23 2205     naloxone (NARCAN) injection 0.2 mg  0.2 mg Intravenous Q2 Min PRN        Or     naloxone (NARCAN) injection 0.4 mg  0.4 mg Intravenous Q2 Min PRN        Or     naloxone (NARCAN) injection 0.2 mg   0.2 mg Intramuscular Q2 Min PRN        Or     naloxone (NARCAN) injection 0.4 mg  0.4 mg Intramuscular Q2 Min PRN         nicotine (COMMIT) lozenge 2-4 mg  2-4 mg Buccal Q1H PRN         nicotine (NICODERM CQ) 21 MG/24HR 24 hr patch 1 patch  1 patch Transdermal Daily   1 patch at 07/18/23 0822     nicotine Patch in Place   Transdermal Q8H         ondansetron (ZOFRAN ODT) ODT tab 4 mg  4 mg Oral Q6H PRN        Or     ondansetron (ZOFRAN) injection 4 mg  4 mg Intravenous Q6H PRN   4 mg at 07/18/23 0126     oxyCODONE (ROXICODONE) tablet 5-10 mg  5-10 mg Oral Q4H PRN   10 mg at 07/19/23 0959     piperacillin-tazobactam (ZOSYN) intermittent infusion 3.375 g  3.375 g Intravenous Q6H 100 mL/hr at 07/19/23 1209 3.375 g at 07/19/23 1209     prochlorperazine (COMPAZINE) injection 10 mg  10 mg Intravenous Q6H PRN        Or     prochlorperazine (COMPAZINE) tablet 10 mg  10 mg Oral Q6H PRN        Or     prochlorperazine (COMPAZINE) suppository 25 mg  25 mg Rectal Q12H PRN         senna-docusate (SENOKOT-S/PERICOLACE) 8.6-50 MG per tablet 1 tablet  1 tablet Oral BID   1 tablet at 07/18/23 2000    Or     senna-docusate (SENOKOT-S/PERICOLACE) 8.6-50 MG per tablet 2 tablet  2 tablet Oral BID         sodium chloride (PF) 0.9% PF flush 3 mL  3 mL Intracatheter Q8H   3 mL at 07/19/23 0748     sodium chloride (PF) 0.9% PF flush 3 mL  3 mL Intracatheter q1 min prn   3 mL at 07/18/23 2001     Current Outpatient Medications Ordered in Epic   Medication     alcohol swab prep pads     blood glucose (NO BRAND SPECIFIED) test strip     blood glucose calibration (NO BRAND SPECIFIED) solution     blood glucose monitoring (NO BRAND SPECIFIED) meter device kit     blood glucose monitoring (NO BRAND SPECIFIED) meter device kit     Sharps Container MISC     thin (NO BRAND SPECIFIED) lancets                  Physical Exam:   Vitals were reviewed  Patient Vitals for the past 8 hrs:   BP Temp Temp src Pulse Resp SpO2 Weight   07/19/23 0738 137/73 98.1   F (36.7  C) Oral 85 16 97 % --   07/19/23 0500 -- -- -- -- -- -- 78.6 kg (173 lb 3.2 oz)     GEN: NAD, lying in bed  EYES: EOMI  MOUTH: MMM  NECK: Supple  RESP: Unlabored breathing  SKIN: Warm  ABD: soft  NEURO: AAO  URO: Circumcised phallus.  The area near the anus does show some granulation tissue as does the area in the middle of the perineum.  At the base of the scrotum, there appears to be superficial serosanguineous drainage.  No eschar or crepitus noted.           Data:   No results found for: NTBNPI, NTBNP  Lab Results   Component Value Date    WBC 10.7 07/19/2023    WBC 14.0 (H) 07/18/2023    WBC 17.6 (H) 07/17/2023    HGB 9.8 (L) 07/19/2023    HGB 11.0 (L) 07/18/2023    HGB 10.3 (L) 07/17/2023    HCT 28.9 (L) 07/19/2023    HCT 31.6 (L) 07/18/2023    HCT 30.8 (L) 07/17/2023    MCV 87 07/19/2023    MCV 86 07/18/2023    MCV 89 07/17/2023     07/19/2023     07/18/2023     07/17/2023     Lab Results   Component Value Date    INR 1.05 04/25/2013     All cultures:  Recent Labs   Lab 07/15/23  1147 07/15/23  0639   CULTURE 1+ Klebsiella pneumoniae*  3+ Streptococcus agalactiae (Group B Streptococcus)* No growth after 4 days  No growth after 4 days

## 2023-07-19 NOTE — PROGRESS NOTES
COLON & RECTAL SURGERY  PROGRESS NOTE    July 19, 2023    SUBJECTIVE:  Patient feels like his pain has been improving. He still notes some purulent discharge. Has been urinating, passing gas, and having bowel movements.     OBJECTIVE:  Temp:  [98.1  F (36.7  C)-98.4  F (36.9  C)] 98.1  F (36.7  C)  Pulse:  [80-87] 85  Resp:  [14-16] 16  BP: (137-159)/(73-89) 137/73  SpO2:  [96 %-97 %] 97 %    Intake/Output Summary (Last 24 hours) at 7/19/2023 0851  Last data filed at 7/18/2023 2000  Gross per 24 hour   Intake 575 ml   Output --   Net 575 ml       GENERAL:  Awake, alert, no acute distress, lying in bed  HEAD: Nomocephalic atraumatic  SCLERA: anicteric  EXTREMITIES: warm and well perfused  RECTAL: In the left anterior there is an ulcerated lesion with some purulent discharge and exudate. This seems more superficial and tracks anteriorly. There is another lesion more anterior than this with some induration, but is softer. Scrotal swelling and edema.    LABS:  Lab Results   Component Value Date    WBC 14.0 07/18/2023    WBC 9.8 05/15/2016     Lab Results   Component Value Date    HGB 11.0 07/18/2023    HGB 17.2 05/15/2016     Lab Results   Component Value Date    HCT 31.6 07/18/2023    HCT 47.1 05/15/2016     Lab Results   Component Value Date     07/18/2023     05/15/2016     Last Basic Metabolic Panel:  Lab Results   Component Value Date     07/18/2023     05/20/2016      Lab Results   Component Value Date    POTASSIUM 3.7 07/18/2023    POTASSIUM 3.9 06/01/2022    POTASSIUM 4.4 05/20/2016     Lab Results   Component Value Date    CHLORIDE 101 07/18/2023    CHLORIDE 108 06/01/2022    CHLORIDE 107 05/20/2016     Lab Results   Component Value Date    JACKIE 9.1 07/18/2023    JACKIE 9.1 05/20/2016     Lab Results   Component Value Date    CO2 24 07/18/2023    CO2 26 06/01/2022    CO2 26 05/20/2016     Lab Results   Component Value Date    BUN 10.6 07/18/2023    BUN 15 06/01/2022    BUN 14 05/20/2016      Lab Results   Component Value Date    CR 1.21 07/18/2023    CR 0.66 05/20/2016     Lab Results   Component Value Date     07/19/2023     06/01/2022     05/20/2016       ASSESSMENT/PLAN:  32 year old male who presented with perineal/scrotal infection.     - Regular diet  - Continue IV antibiotics  - Continue to optimize glucose control  - Defer to urology regarding operative debridement. If urology planning surgery we can make ourselves available to assess the perianal component.       For questions/paging, please contact the CRS office at [phone].    Kaley Severino PA-C  [facility]  Phone: [phone]      [facility] Attending Note    Patient seen and examined independently.  Agree with above assessment and plan.  Less pain. Eating and having BM without FI, continued perianal drainage  Wound with purulent discharge minimal induration  Plan  abx and wound care.  Tight glucose control    Yuki Abebe MD  [facility]  [address], [address]  [place] [address]  T: [phone]  F: [phone]   [email or web address]

## 2023-07-19 NOTE — PROGRESS NOTES
A/O, Up independently to the bathroom,lung sounds clear, vss, tolerating mod carb diet, Scrotum edema +3, wound care done-elevated. Pain controlled with prn Oxy/Iv dilaudid. Voiding independently in the bathroom.Continues on Cleocin/Zosy. Plan to continue on abx for 1-2 days before discharge will continue with poc.

## 2023-07-19 NOTE — PROGRESS NOTES
[facility]  Hospitalist Progress Note  Douglas Mccoy MD 07/19/2023    Reason for Stay (Diagnosis): scrotal/perineal cellulitis         Assessment and Plan:      Summary of Stay: Ubaldo Zamora is a 32 year old male with long-standing poorly controlled DM2 and history of DKA and dental infections who presented to [facility] with several days perineal pain radiating to the scrotum and down the inner thighs with associate N/V.  He is found to have a draining perineal cellulitis with sepsis (WBC 30, ANC 25.2) and non-acidotic hyperglycemia with ketosis.  CT Abd/Pelv showed perineal swelling without clear abscess or gas formation in addition to some gas noted in the gallbladder.  Empirically started on Clindamycin, Zosyn, and Vanc.  Urology/colorectal surgery consulted.  At this time surgery is not felt to be indicated and patient stable/improved with IV antibiotics.  No fever and WBC count is improving     Plans today:  - continue IV Zosyn and IV clindamycin  - increased Lantus to 23 units daily  - Pt is on day 5 of IV antibiotics and is showing clinical improvement.  ? Transition to PO antibiotics with discharge home soon        Perineal cellulitis with sepsis  - CT Abd/Pelv notable for edema but negative for abscess or gas. - Seen by both urology and colorectal surgery and not needing any surgical exploration at this point.    - Culture was taken by urology exploring the wound and is growing klebsiella and strep species   - treated with IV vanco (7/15-7/17)  - treated with IV Zosyn and IV clinda (7/15-present)      Longstanding poorly controlled DM2 complicated by recurring infections and medication noncompliance  - HgbA1c 14 with poor management  - started on Lantus and Novolog insulin this admit with improved control.    - Does not have a glucometer at home.  Glucometer with associated test strips, lancets, alcohol swabs, and sharps container ordered in the discharge navigator.  Additionally,  "endocrinology and diabetes educator referrals also placed in the discharge navigator.  Social work to see about financial issues.  A long discussion about the consequences of uncontrolled diabetes and the long-term including renal failure, blindness, impotence, etc was had with patient by several providers (myself included)         HTN  - continue on lisinopril.           Tobacco dependence  Smokes ~1ppd.  Agreeable to nicotine replacement.  Tobacco cessation strongly encouraged, in part because of his underlying DM and predisposition to ASCVD (mother had early cardiac disease).       Noncompliance/financial issues/no insurance:  - Patient has been noncompliant with taking care of himself due to the fact he has no insurance.    - SW consulted     Clinically Significant Risk Factors Present on Admission          # Hypertension: Home medication list includes antihypertensive(s)      # Overweight: Estimated body mass index is 26.63 kg/m  as calculated from the following:    Height as of this encounter: 1.651 m (5' 5\").    Weight as of this encounter: 72.6 kg (160 lb).             Diet: Advance Diet as Tolerated: Fully Advanced to diet(s) per Provider order; Moderate Consistent Carb (60 g CHO per Meal) Diet    DVT Prophylaxis: Ambulate every shift  Boss Catheter: Not present  Lines: None     Cardiac Monitoring: None  DISPO:  Home anticipated after adequate IV antibiotic course and improvement in cellulitis; another 1-2 days in house anticipated            Interval History (Subjective):      Scrotal swelling decreasing.  Pain minimal.  No new concerns.  Eager to discharge                  Physical Exam:      Last Vital Signs:  /73 (BP Location: Left arm)   Pulse 85   Temp 98.1  F (36.7  C) (Oral)   Resp 16   Ht 1.651 m (5' 5\")   Wt 78.6 kg (173 lb 3.2 oz)   SpO2 97%   BMI 28.82 kg/m        Intake/Output Summary (Last 24 hours) at 7/19/2023 1213  Last data filed at 7/18/2023 2000  Gross per 24 hour   Intake " 575 ml   Output --   Net 575 ml       Constitutional: Awake, alert, cooperative, no apparent distress   Respiratory: Clear to auscultation bilaterally, no crackles or wheezing   Cardiovascular: Regular rate and rhythm, normal S1 and S2, and no murmur noted   Abdomen: Normal bowel sounds, soft, non-distended, non-tender   Skin: No rashes, no cyanosis, dry to touch   Neuro: Alert and oriented x3, no weakness, numbness, memory loss   Extremities: No edema, normal range of motion   Other(s): Sig other present at bedside    Minimal redness of perineum.  Scrotal swelling decreased.  Shallow ulcers of perineum and scrotum persist   All other systems: Negative          Medications:      All current medications were reviewed with changes reflected in problem list.         Data:      All new lab and imaging data was reviewed.   Labs:  Recent Labs   Lab 07/19/23  1010 07/19/23  0801 07/19/23  0152 07/18/23  1009 07/18/23  0837 07/17/23  1208 07/17/23  0847   *  --   --   --  135*  --  135*   POTASSIUM 3.5  --   --   --  3.7  --  4.1   CHLORIDE 100  --   --   --  101  --  103   CO2 25  --   --   --  24  --  24   ANIONGAP 10  --   --   --  10  --  8   * 199* 176*   < > 166*   < > 184*  200*   BUN 7.8  --   --   --  10.6  --  11.9   CR 1.10  --   --   --  1.21*  --  1.02   GFRESTIMATED >90  --   --   --  83  --  >90   JACKIE 8.9  --   --   --  9.1  --  8.8    < > = values in this interval not displayed.     Recent Labs   Lab 07/19/23  1010   WBC 10.7   HGB 9.8*   HCT 28.9*   MCV 87         Imaging:   No results found for this or any previous visit (from the past 24 hour(s)).

## 2023-07-20 VITALS
DIASTOLIC BLOOD PRESSURE: 90 MMHG | SYSTOLIC BLOOD PRESSURE: 147 MMHG | HEIGHT: 65 IN | BODY MASS INDEX: 29.07 KG/M2 | HEART RATE: 81 BPM | TEMPERATURE: 98.2 F | OXYGEN SATURATION: 96 % | WEIGHT: 174.5 LBS | RESPIRATION RATE: 16 BRPM

## 2023-07-20 LAB
BACTERIA BLD CULT: NO GROWTH
BACTERIA BLD CULT: NO GROWTH
GLUCOSE BLDC GLUCOMTR-MCNC: 213 MG/DL (ref 70–99)
GLUCOSE BLDC GLUCOMTR-MCNC: 256 MG/DL (ref 70–99)
GLUCOSE BLDC GLUCOMTR-MCNC: 260 MG/DL (ref 70–99)

## 2023-07-20 PROCEDURE — 250N000013 HC RX MED GY IP 250 OP 250 PS 637: Performed by: INTERNAL MEDICINE

## 2023-07-20 PROCEDURE — G0463 HOSPITAL OUTPT CLINIC VISIT: HCPCS

## 2023-07-20 PROCEDURE — 250N000013 HC RX MED GY IP 250 OP 250 PS 637: Performed by: PHYSICIAN ASSISTANT

## 2023-07-20 PROCEDURE — 250N000011 HC RX IP 250 OP 636: Performed by: PHYSICIAN ASSISTANT

## 2023-07-20 PROCEDURE — 250N000013 HC RX MED GY IP 250 OP 250 PS 637: Performed by: HOSPITALIST

## 2023-07-20 PROCEDURE — 99231 SBSQ HOSP IP/OBS SF/LOW 25: CPT | Performed by: PHYSICIAN ASSISTANT

## 2023-07-20 PROCEDURE — 99238 HOSP IP/OBS DSCHRG MGMT 30/<: CPT | Performed by: INTERNAL MEDICINE

## 2023-07-20 RX ORDER — OXYCODONE HYDROCHLORIDE 5 MG/1
5 TABLET ORAL EVERY 6 HOURS PRN
Qty: 12 TABLET | Refills: 0 | Status: SHIPPED | OUTPATIENT
Start: 2023-07-20 | End: 2023-07-23

## 2023-07-20 RX ORDER — LISINOPRIL 20 MG/1
20 TABLET ORAL DAILY
Qty: 30 TABLET | Refills: 1 | Status: SHIPPED | OUTPATIENT
Start: 2023-07-20 | End: 2023-08-15

## 2023-07-20 RX ORDER — INSULIN ASPART 100 [IU]/ML
INJECTION, SOLUTION INTRAVENOUS; SUBCUTANEOUS
Qty: 15 ML | Refills: 1 | Status: SHIPPED | OUTPATIENT
Start: 2023-07-20 | End: 2023-08-15

## 2023-07-20 RX ADMIN — CLINDAMYCIN PHOSPHATE 900 MG: 900 INJECTION, SOLUTION INTRAVENOUS at 10:46

## 2023-07-20 RX ADMIN — ANORECTAL OINTMENT: 15.7; .44; 24; 20.6 OINTMENT TOPICAL at 10:46

## 2023-07-20 RX ADMIN — OXYCODONE HYDROCHLORIDE 5 MG: 5 TABLET ORAL at 06:27

## 2023-07-20 RX ADMIN — PIPERACILLIN SODIUM AND TAZOBACTAM SODIUM 3.38 G: 3; .375 INJECTION, SOLUTION INTRAVENOUS at 06:22

## 2023-07-20 RX ADMIN — HYDROMORPHONE HYDROCHLORIDE 1 MG: 1 INJECTION, SOLUTION INTRAMUSCULAR; INTRAVENOUS; SUBCUTANEOUS at 09:44

## 2023-07-20 RX ADMIN — AMOXICILLIN AND CLAVULANATE POTASSIUM 1 TABLET: 875; 125 TABLET, FILM COATED ORAL at 10:48

## 2023-07-20 RX ADMIN — OXYCODONE HYDROCHLORIDE 10 MG: 5 TABLET ORAL at 10:45

## 2023-07-20 RX ADMIN — CLINDAMYCIN PHOSPHATE 900 MG: 900 INJECTION, SOLUTION INTRAVENOUS at 01:19

## 2023-07-20 RX ADMIN — OXYCODONE HYDROCHLORIDE 10 MG: 5 TABLET ORAL at 00:52

## 2023-07-20 RX ADMIN — LISINOPRIL 20 MG: 20 TABLET ORAL at 09:42

## 2023-07-20 ASSESSMENT — ACTIVITIES OF DAILY LIVING (ADL)
ADLS_ACUITY_SCORE: 23

## 2023-07-20 NOTE — DISCHARGE INSTRUCTIONS
Wound Care instructions  Scrotum and perineum  wound(s): 3 times daily   Cleanse entire scrotum and perineal area with vashe moistened kerlix x 15 minutes  Apply perineal cleanser using todd dry cloths  Apply Calmoseptine barrier paste  ICE and elevate as you tolerate   Can purchase wound care products vashe and Calmoseptine on Amazon

## 2023-07-20 NOTE — PLAN OF CARE
Goal Outcome Evaluation:      Plan of Care Reviewed With: patient    Overall Patient Progress: improvingOverall Patient Progress: improving       Pt A&O. Ambulates independently in room. VSS. Afebrile. Denied nausea and SOB. Pain managed with Iv dilaudid/IV toradol, and Oxy. Wound with moderate drainage. Voiding.  Discharge pending.

## 2023-07-20 NOTE — PLAN OF CARE
A&Ox4. VSS. Ind. Blood sugars monitored. Wound care. Passing gas and voiding. Painful but prns effective. Education materials provided on diabetes/carb counting. Also encouraged patient to call the endocrinology and diabetes referrals ASAP to help facilitate good diabetes management at home. AVS done. Meds given for discharge.

## 2023-07-20 NOTE — CONSULTS
[facility] Nurse Inpatient Assessment     Consulted for: scrotum and perineum     Summary: Patient stated he found it as a lump that drained and he reports this happened x 2 days ago    Patient History (according to provider note(s):      Ubaldo Zamora is a 32 year old male with long-standing poorly controlled DM2 and history of DKA and dental infections who presented to [facility] with several days perineal pain radiating to the scrotum and down the inner thighs with associate N/V.  He is found to have a draining perineal cellulitis with sepsis (WBC 30, ANC 25.2) and non-acidotic hyperglycemia with ketosis.  CT Abd/Pelv showed perineal swelling without clear abscess or gas formation in addition to some gas noted in the gallbladder.  Received 1L NS.  Empirically started on Clindamycin, Zosyn, and Vanc.  Urology consulted; will see.  Hospitalists admitting to inpatient.       Perineal cellulitis with sepsis  Meets sepsis criteria based on WBC 30, ANC 25.2.  While patient noted to have a small draining area, CT Abd/Pelv notable for edema but negative for abscess or gas.  Ambulating but having a difficult time sitting or laying.  No recent trauma including poorly fitting clothing (wears boxers).  No history of perineal infections.    Assessment:      Areas visualized during today's visit: Focused: and Perineal area    Wound Location:  Left medial/posterior scrotum and perineal area     Date of last photo 7/17  Wound History: see above   Wound Base: 100 % dermis     Palpation of the wound bed: firm      Drainage: moderate     Description of drainage: serosanguinous     Measurements (length x width x depth, in cm)   Scrotum 4 x 5 x 0.2 cm  Perineum 5  x 2 cm  x  utd 100% yellow slough     Tunneling N/A     Undermining N/A  Periwound skin: edematous      Color: red      Temperature:warm  Odor: mild  Pain: tension to hands, feet and body, burning states it is a 7     Treatment Plan:     Scrotum and  perineum  wound(s): Every shift   Cleanse entire scrotum and perineal area with vashe moistened kerlix x 15 minutes  Apply perineal cleanser using todd dry cloths  Apply Calmoseptine barrier paste   ICE and elevate as he tolerates     Orders: Updated    RECOMMEND PRIMARY TEAM ORDER: urology clinic follow up or wound clinic follow up   Education provided: plan of care, Infection prevention  and Off-loading pressure  Discussed plan of care with: Patient and Family on not picking any bumps in the perineal area, manage your blood sugars or you will get more of these. Can purchase these products for wound care on Amazon.   [facility] nurse follow-up plan: weekly  Notify [facility] if wound(s) deteriorate.  Nursing to notify the Provider(s) and re-consult the [facility] Nurse if new skin concern.    DATA:     Current support surface: Standard  Low air loss (CRISSY pump, Isolibrium, Pulsate, skin guard, etc)  Containment of urine/stool: Continent of bladder, Continent of bowel and Incontinent pad in bed  BMI: Body mass index is 29.04 kg/m .   Active diet order: Orders Placed This Encounter      Advance Diet as Tolerated: Fully Advanced to diet(s) per Provider order; Moderate Consistent Carb (60 g CHO per Meal) Diet     Output: No intake/output data recorded.     Labs:   Recent Labs   Lab 07/19/23  1010 07/16/23  0548 07/15/23  0550   ALBUMIN  --   --  4.3   HGB 9.8*   < > 14.7   WBC 10.7   < > 30.0*   A1C  --   --  14.6*    < > = values in this interval not displayed.     Pressure injury risk assessment:   Sensory Perception: 4-->no impairment  Moisture: 3-->occasionally moist  Activity: 3-->walks occasionally  Mobility: 4-->no limitation  Nutrition: 3-->adequate  Friction and Shear: 3-->no apparent problem  Chris Score: 20    IAM Salas RN CWOCN  Pager no longer is use, please contact through [employer] group: [facility] Nurse Cassandra  Department Phone: [phone]

## 2023-07-20 NOTE — DISCHARGE SUMMARY
"[facility]  Hospitalist Discharge Summary      Date of Admission:  7/15/2023  Date of Discharge:  7/20/2023  1:30 PM  Discharging Provider: Douglas Mccoy MD  Discharge Service: Hospitalist Service    Discharge Diagnoses   Perineal/scrotal cellulitis with sepsis  - improved with non-operative management/antibiotics this admission      Longstanding poorly controlled DM2 complicated by recurring infections and medication noncompliance  - HgbA1c 14   - started on Lantus and Novolog insulin this admit with improved control.        HTN      Tobacco dependence     Noncompliance/financial issues/no insurance:  - Patient has been noncompliant with taking care of himself due lack of insurance.    - SW consulted to assist with financial concerns this admission         Clinically Significant Risk Factors     # DMII: A1C = 14.6 % (Ref range: <5.7 %) within past 6 months  # Overweight: Estimated body mass index is 29.04 kg/m  as calculated from the following:    Height as of this encounter: 1.651 m (5' 5\").    Weight as of this encounter: 79.2 kg (174 lb 8 oz).       Follow-ups Needed After Discharge   Follow-up Appointments     Follow-up and recommended labs and tests       Establish care with a primary provider    Check and record your blood glucose three times a day.  Recommend to see   your primary provider in 5-7 days for reevaluation of your infection and   also assessment of your diabetic control.  Bring the results of your blood   glucose to your primary provider visit so your insulin dose can be   adjusted as needed            Unresulted Labs Ordered in the Past 30 Days of this Admission     No orders found from 6/15/2023 to 7/16/2023.          Discharge Disposition   Discharged to home  Condition at discharge: Stable    Hospital Course   32 year old male with long-standing poorly controlled DM2 and history of DKA and dental infections who presented to [facility] with several days perineal pain " radiating to the scrotum and down the inner thighs.  He was found to have a draining perineal cellulitis with sepsis (WBC 30, ANC 25.2) and non-acidotic hyperglycemia with ketosis.  CT Abd/Pelv showed perineal swelling without clear abscess or gas formation in addition to some gas noted in the gallbladder.  Empirically treated with Clindamycin and Zosyn.  Urology/colorectal surgery consulted and non-operative management was recommended.  The patient had no fever and WBC count normalized while hospitalized with significant improvement/resolution of cellulitis and improving scrotal edema.  He appeared stable for discharge today.      The patient's DM was uncontrolled on admission.  He was started on insulin and a glucometer ordered for discharge.  He was strongly encouraged to better manage his DM and follow up with a primary provider for insulin titration and management going forward.      Consultations This Hospital Stay   PHARMACY TO DOSE VANCO  COLORECTAL SURGERY IP CONSULT  PHARMACY TO DOSE VANCO  SOCIAL WORK IP CONSULT  WOUND OSTOMY CONTINENCE NURSE  IP CONSULT    Code Status   Full Code    Time Spent on this Encounter   I, Douglas Mccoy MD, personally saw the patient today and spent less than or equal to 30 minutes discharging this patient.       Douglas Mccoy MD  [facility]  [address] [place] [address]  Phone: [phone]  Fax: [phone]  ______________________________________________________________________    Physical Exam   Vital Signs: Temp: 98.2  F (36.8  C) Temp src: Temporal BP: (!) 147/90 Pulse: 81   Resp: 16 SpO2: 96 % O2 Device: None (Room air)    Weight: 174 lbs 8 oz  Awake, alert, interactive.  Sig other present at bedside  No perineal redness.  Decreased scrotal swelling.         Primary Care Physician   Rell Byrd    Discharge Orders      Diabetes Educator Referral      Adult Endocrinology  Referral      Reason for your  hospital stay    Scrotal cellulitis, uncontrolled diabetes     Follow-up and recommended labs and tests     Establish care with a primary provider    Check and record your blood glucose three times a day.  Recommend to see your primary provider in 5-7 days for reevaluation of your infection and also assessment of your diabetic control.  Bring the results of your blood glucose to your primary provider visit so your insulin dose can be adjusted as needed     Activity    Your activity upon discharge: activity as tolerated     Diet    Follow this diet upon discharge: diabetic diet       Significant Results and Procedures   Results for orders placed or performed during the hospital encounter of 07/15/23   CT Abdomen Pelvis w Contrast    Narrative    EXAM: CT ABDOMEN AND PELVIS WITH CONTRAST  LOCATION: [facility]  DATE: 07/15/2023    INDICATION: Abdominal pain, vomiting, perineal infection, please extend down to mid thighs to get entire scrotal   perineal region.  COMPARISON: None.  TECHNIQUE: CT scan of the abdomen and pelvis was performed following injection of IV contrast. Multiplanar reformats were obtained. Dose reduction techniques were used.  CONTRAST: 80 mL Isovue 370.    FINDINGS:   LOWER CHEST: Normal.    HEPATOBILIARY: Cholelithiasis. Gas in the gallbladder lumen presumably from prior sphincterotomy.    PANCREAS: Normal.    SPLEEN: Normal.    ADRENAL GLANDS: Normal.    KIDNEYS/BLADDER: Normal.    BOWEL: Normal.    LYMPH NODES: Normal.    VASCULATURE: Unremarkable.    PELVIC ORGANS: There is edema on the left side of the perineum and scrotum. No gas in the soft tissues. No abscess.    MUSCULOSKELETAL: Normal.      Impression    IMPRESSION:   1.  Edema in the scrotum and left side of the perineum without evidence of abscess or gas.  2.  Cholelithiasis.             Discharge Medications   Discharge Medication List as of 7/20/2023 12:56 PM      START taking these medications    Details    amoxicillin-clavulanate (AUGMENTIN) 875-125 MG tablet Take 1 tablet by mouth 2 times daily for 7 days, Disp-14 tablet, R-0, E-Prescribe      insulin aspart (NOVOLOG FLEXPEN) 100 UNIT/ML pen 1 units per 10 gram carb unit before breakfast, lunch and dinner, Disp-15 mL, R-1, E-Prescribe      insulin glargine (LANTUS PEN) 100 UNIT/ML pen Inject 27 Units Subcutaneous every morning (before breakfast), Disp-15 mL, R-1, E-PrescribeIf Lantus is not covered by insurance, may substitute Basaglar or Semglee or other insulin glargine product per insurance preference at same dose and frequency.         oxyCODONE (ROXICODONE) 5 MG tablet Take 1 tablet (5 mg) by mouth every 6 hours as needed for pain, Disp-12 tablet, R-0, Local Print      Sharps Container MISC 1 Units See Admin Instructions .  Use sharps container as instructed to dispose of needles and lancets., Disp-1 each, R-11, E-Prescribe         CONTINUE these medications which have CHANGED    Details   !! blood glucose monitoring (NO BRAND SPECIFIED) meter device kit Use to test blood sugar with meals, at bedtime, and PRNDisp-1 kit, [id number] lancets, test strips, alcohol swabs, calibration kit, and sharps dispenser.      !! blood glucose monitoring (NO BRAND SPECIFIED) meter device kit Use to test blood sugar as directed.Disp-1 kit, [id number]      lisinopril (ZESTRIL) 20 MG tablet Take 1 tablet (20 mg) by mouth daily, Disp-30 tablet, R-1, E-Prescribe       !! - Potential duplicate medications found. Please discuss with provider.      CONTINUE these medications which have NOT CHANGED    Details   alcohol swab prep pads Use to swab area of injection/kelechi as directed.Disp-100 each, [id number]      blood glucose (NO BRAND SPECIFIED) test strip Use to test blood sugar daily or as directed. To accompany: Blood Glucose Monitor Brands: per insurance., Disp-300 strip, R-11, E-Prescribe      blood glucose calibration (NO BRAND SPECIFIED) solution To accompany:  Blood Glucose Monitor Brands: per insurance., Disp-1 each, R-3, E-Prescribe      thin (NO BRAND SPECIFIED) lancets Use with lanceting device. To accompany: Blood Glucose Monitor Brands: per insurance., Disp-100 each, R-6, E-Prescribe           Allergies   No Known Allergies

## 2023-07-20 NOTE — PROGRESS NOTES
I saw and examined this patient today    Scrotal swelling continues to decrease daily.  Minimal redness/induration of scrotum/perineum.      Is on day 6 of IV antibiotics    Looks stable for transition to oral antibiotics and discharge home today    Will discharge on Augmentin x 1 week    Importance of diabetic management going forward again stressed with pt today

## 2023-07-20 NOTE — PROGRESS NOTES
[facility]rology Progress Note          Assessment and Plan:     Assessment:    Calculus of gallbladder without cholecystitis without obstruction    Cellulitis of perineum    Hyperglycemia    Poorly controlled diabetes mellitus (H)      Plan:   -Continue with antibiotics.  -Would recommend continuing with serial examinations to look for any concerning changes.  -Continue to work on diabetic control.  Stressed with the patient about the need to work on diabetes and establish with primary care provider as well as possible endocrinology due to concern for recurrent infections with poorly controlled blood sugar.  -Continue with dressing changes per WOC.  -Okay to discharge from urology perspective.  -Our office will contact patient for wound check in 2-3 weeks.  Will plan on signing off.  Please contact us with any urologic concerns.    Gay Mcclellan PA-C   [facility]  [phone]               Interval History:     Doing well. Denies N/V/F/C/SOB/CP.  Significant other at bedside.  Circumcised phallus.  The area near the anus does show some granulation tissue as does the area in the middle of the perineum. At the base of the scrotum, slight bruising and open area with bloody serosanguinous drainage.  No eschar or crepitus.  Decreased scrotal swelling.  Non-tender.   Patient is afebrile without tachycardia.  On IV clindamycin and Zosyn, plan to discharge with Augmentin. Culture showed 3+ group B strep and 1+ Klebsiella pneumoniae.            Review of Systems:     The 5 point Review of Systems is negative other than noted in the HPI             Medications:     Current Facility-Administered Medications Ordered in Epic   Medication Dose Route Frequency Last Rate Last Admin     acetaminophen (TYLENOL) tablet 500-1,000 mg  500-1,000 mg Oral 4x Daily PRN   500 mg at 07/19/23 2207     bisacodyl (DULCOLAX) suppository 10 mg  10 mg Rectal Daily PRN         clindamycin (CLEOCIN) 900 mg in 50 mL NS  intermittent infusion  900 mg Intravenous Q8H   900 mg at 07/20/23 1046     glucose gel 15-30 g  15-30 g Oral Q15 Min PRN        Or     dextrose 50 % injection 25-50 mL  25-50 mL Intravenous Q15 Min PRN        Or     glucagon injection 1 mg  1 mg Subcutaneous Q15 Min PRN         glucose gel 15-30 g  15-30 g Oral Q15 Min PRN        Or     dextrose 50 % injection 25-50 mL  25-50 mL Intravenous Q15 Min PRN        Or     glucagon injection 1 mg  1 mg Subcutaneous Q15 Min PRN         HYDROmorphone (PF) (DILAUDID) injection 0.5-1 mg  0.5-1 mg Intravenous Q2H PRN   1 mg at 07/20/23 0944     insulin aspart (NovoLOG) injection (RAPID ACTING)  1-7 Units Subcutaneous TID AC   3 Units at 07/20/23 0936     insulin aspart (NovoLOG) injection (RAPID ACTING)  1-5 Units Subcutaneous At Bedtime   4 Units at 07/19/23 2208     insulin aspart (NovoLOG) injection (RAPID ACTING)   Subcutaneous TID AC   5 Units at 07/20/23 0937     insulin glargine (LANTUS PEN) injection 27 Units  27 Units Subcutaneous QAM AC   27 Units at 07/20/23 0937     ketorolac (TORADOL) injection 15 mg  15 mg Intravenous Q6H PRN   15 mg at 07/19/23 2047     lidocaine (LMX4) cream   Topical Q1H PRN         lidocaine 1 % 0.1-1 mL  0.1-1 mL Other Q1H PRN         lisinopril (ZESTRIL) tablet 20 mg  20 mg Oral Daily   20 mg at 07/20/23 0942     magnesium hydroxide (MILK OF MAGNESIA) suspension 30 mL  30 mL Oral Daily PRN         melatonin tablet 3 mg  3 mg Oral At Bedtime PRN   3 mg at 07/18/23 2205     menthol-zinc oxide (CALMOSEPTINE) 0.44-20.6 % ointment OINT   Topical TID   Given at 07/20/23 1046     naloxone (NARCAN) injection 0.2 mg  0.2 mg Intravenous Q2 Min PRN        Or     naloxone (NARCAN) injection 0.4 mg  0.4 mg Intravenous Q2 Min PRN        Or     naloxone (NARCAN) injection 0.2 mg  0.2 mg Intramuscular Q2 Min PRN        Or     naloxone (NARCAN) injection 0.4 mg  0.4 mg Intramuscular Q2 Min PRN         nicotine (COMMIT) lozenge 2-4 mg  2-4 mg Buccal Q1H PRN          nicotine (NICODERM CQ) 21 MG/24HR 24 hr patch 1 patch  1 patch Transdermal Daily   1 patch at 07/18/23 0822     nicotine Patch in Place   Transdermal Q8H         ondansetron (ZOFRAN ODT) ODT tab 4 mg  4 mg Oral Q6H PRN        Or     ondansetron (ZOFRAN) injection 4 mg  4 mg Intravenous Q6H PRN   4 mg at 07/18/23 0126     oxyCODONE (ROXICODONE) tablet 5-10 mg  5-10 mg Oral Q4H PRN   10 mg at 07/20/23 1045     prochlorperazine (COMPAZINE) injection 10 mg  10 mg Intravenous Q6H PRN        Or     prochlorperazine (COMPAZINE) tablet 10 mg  10 mg Oral Q6H PRN        Or     prochlorperazine (COMPAZINE) suppository 25 mg  25 mg Rectal Q12H PRN         senna-docusate (SENOKOT-S/PERICOLACE) 8.6-50 MG per tablet 1 tablet  1 tablet Oral BID   1 tablet at 07/18/23 2000    Or     senna-docusate (SENOKOT-S/PERICOLACE) 8.6-50 MG per tablet 2 tablet  2 tablet Oral BID         sodium chloride (PF) 0.9% PF flush 3 mL  3 mL Intracatheter Q8H   3 mL at 07/20/23 0942     sodium chloride (PF) 0.9% PF flush 3 mL  3 mL Intracatheter q1 min prn   3 mL at 07/18/23 2001     Current Outpatient Medications Ordered in Epic   Medication     alcohol swab prep pads     amoxicillin-clavulanate (AUGMENTIN) 875-125 MG tablet     blood glucose (NO BRAND SPECIFIED) test strip     blood glucose calibration (NO BRAND SPECIFIED) solution     blood glucose monitoring (NO BRAND SPECIFIED) meter device kit     blood glucose monitoring (NO BRAND SPECIFIED) meter device kit     insulin aspart (NOVOLOG FLEXPEN) 100 UNIT/ML pen     [START ON 7/21/2023] insulin glargine (LANTUS PEN) 100 UNIT/ML pen     lisinopril (ZESTRIL) 20 MG tablet     oxyCODONE (ROXICODONE) 5 MG tablet     Sharps Container MISC     thin (NO BRAND SPECIFIED) lancets                  Physical Exam:   Vitals were reviewed  Patient Vitals for the past 8 hrs:   BP Temp Temp src Pulse Resp SpO2 Weight   07/20/23 0810 (!) 147/90 -- -- -- -- -- --   07/20/23 0808 (!) 148/91 98.2  F (36.8  C)  Temporal 81 16 96 % --   07/20/23 0609 -- -- -- -- -- -- 79.2 kg (174 lb 8 oz)     GEN: NAD, sitting up in bed  EYES: EOMI  MOUTH: MMM  NECK: Supple  RESP: Unlabored breathing  SKIN: Warm  ABD: soft  NEURO: AAO  URO: Circumcised phallus.  The area near the anus does show some granulation tissue as does the area in the middle of the perineum. At the base of the scrotum, slight bruising and open area with bloody serosanguinous drainage.  No eschar or crepitus.  Decreased scrotal swelling.  Non-tender.           Data:   No results found for: NTBNPI, NTBNP  Lab Results   Component Value Date    WBC 10.7 07/19/2023    WBC 14.0 (H) 07/18/2023    WBC 17.6 (H) 07/17/2023    HGB 9.8 (L) 07/19/2023    HGB 11.0 (L) 07/18/2023    HGB 10.3 (L) 07/17/2023    HCT 28.9 (L) 07/19/2023    HCT 31.6 (L) 07/18/2023    HCT 30.8 (L) 07/17/2023    MCV 87 07/19/2023    MCV 86 07/18/2023    MCV 89 07/17/2023     07/19/2023     07/18/2023     07/17/2023     Lab Results   Component Value Date    INR 1.05 04/25/2013     All cultures:  Recent Labs   Lab 07/15/23  1147 07/15/23  0639   CULTURE 1+ Klebsiella pneumoniae*  3+ Streptococcus agalactiae (Group B Streptococcus)* No growth after 4 days  No growth after 4 days

## 2023-07-20 NOTE — PROGRESS NOTES
[facility]ON [facility] RECTAL SURGERY  PROGRESS NOTE    July 20, 2023    SUBJECTIVE:  Still with pain and drainage but feels like he is improving. +BM/gas.     OBJECTIVE:  Temp:  [96.3  F (35.7  C)-99  F (37.2  C)] 98.2  F (36.8  C)  Pulse:  [71-81] 81  Resp:  [16] 16  BP: (133-148)/(57-91) 147/90  SpO2:  [94 %-99 %] 96 %  No intake or output data in the 24 hours ending 07/20/23 0855    GENERAL:  Awake, alert, no acute distress, lying in bed  HEAD: Nomocephalic atraumatic  SCLERA: anicteric  EXTREMITIES: warm and well perfused  RECTAL: In the left anterior there is a lesion with some purulent discharge and exudate. This tracks anteriorly. There is another lesion more anterior with some purulent drainage. No further induration. Scrotal swelling and edema.    LABS:  Lab Results   Component Value Date    WBC 10.7 07/19/2023    WBC 9.8 05/15/2016     Lab Results   Component Value Date    HGB 9.8 07/19/2023    HGB 17.2 05/15/2016     Lab Results   Component Value Date    HCT 28.9 07/19/2023    HCT 47.1 05/15/2016     Lab Results   Component Value Date     07/19/2023     05/15/2016     Last Basic Metabolic Panel:  Lab Results   Component Value Date     07/19/2023     05/20/2016      Lab Results   Component Value Date    POTASSIUM 3.5 07/19/2023    POTASSIUM 3.9 06/01/2022    POTASSIUM 4.4 05/20/2016     Lab Results   Component Value Date    CHLORIDE 100 07/19/2023    CHLORIDE 108 06/01/2022    CHLORIDE 107 05/20/2016     Lab Results   Component Value Date    JACKIE 8.9 07/19/2023    JACKIE 9.1 05/20/2016     Lab Results   Component Value Date    CO2 25 07/19/2023    CO2 26 06/01/2022    CO2 26 05/20/2016     Lab Results   Component Value Date    BUN 7.8 07/19/2023    BUN 15 06/01/2022    BUN 14 05/20/2016     Lab Results   Component Value Date    CR 1.10 07/19/2023    CR 0.66 05/20/2016     Lab Results   Component Value Date     07/20/2023     06/01/2022     05/20/2016       ASSESSMENT/PLAN:  32  year old male who presented with perineal/scrotal infection.     - Regular diet  - Continue IV antibiotics  - Continue to optimize glucose control  - Will defer to urology regarding operative debridement. If urology planning surgery, we can make ourselves available to assess the perianal component. We will otherwise follow peripherally. Please call with any further questions or concerns.      For questions/paging, please contact the CRS office at [phone].    Kaley Severino PA-C  [facility]  Phone: [phone]

## 2023-07-25 ENCOUNTER — TELEPHONE (OUTPATIENT)
Dept: UROLOGY | Facility: CLINIC | Age: 33
End: 2023-07-25

## 2023-07-25 NOTE — TELEPHONE ENCOUNTER
----- Message from Gay Mcclellan PA-C sent at 7/20/2023 11:26 AM CDT -----  Patient should have a wound check in 2-3 weeks.  (Scrotal infection)

## 2023-07-26 ENCOUNTER — VIRTUAL VISIT (OUTPATIENT)
Dept: EDUCATION SERVICES | Facility: CLINIC | Age: 33
End: 2023-07-26

## 2023-07-26 DIAGNOSIS — E11.65 POORLY CONTROLLED DIABETES MELLITUS (H): Primary | ICD-10-CM

## 2023-07-26 PROCEDURE — G0108 DIAB MANAGE TRN  PER INDIV: HCPCS | Mod: VID | Performed by: DIETITIAN, REGISTERED

## 2023-07-26 NOTE — PROGRESS NOTES
"Diabetes Self-Management Education & Support    Presents for: Individual review    Type of Service: Telephone Visit    Originating Location (Patient Location): Home (virtual visit not possible, had a phone visit)  Distant Location (Provider Location): [facility]  Mode of Communication:  Telephone    Telephone Visit Start Time:  11:17am  Telephone Visit End Time (telephone visit stop time): 12:15pm    How would patient like to obtain AVS? Maxine    ASSESSMENT:  -type 2? Diabetes, patient reports diagnosed age 18, 15 years ago  -recent a1c of 14.6 (in 2016 10.1)  -currently being managed with Lantus and Novolog (when first diagnosed was on Metformin, put on insulin some time after that but has not been taking insulin for years \"my own stupidity\")  -not on aspirin nor statin therapy  -diabetes complications:  DKA.  History of pancreatitis.  -last seen by diabetes education ?  -home BG monitoring reveals:  62% in target since being released from hospital    Primary concern: states has dietary questions.  Significant other is with today.  Reports he is counting carbohydrates and aiming for 30-80 grams of carbohydrate/meal and dosing Novolog based on that.      Discussed:  target BG, onset/peak/duration of basal and bolus insulin, basic carbohydrate counting, meal plan, impact of activity/exercise (as he starts to feel better and risk of hypoglycemia), hypoglycemia symptoms and treatment, cost of medications (he has no insurance, will limit visits to a minimum until he is able to get on insurance).  Patient expressed understanding.      Patient's most recent   Lab Results   Component Value Date    A1C 14.6 07/15/2023    A1C 10.1 05/20/2016    HEMOGLOBINA1 6.7 07/15/2010     is not meeting goal of <7.0    Diabetes knowledge and skills assessment:   Patient is knowledgeable in diabetes management concepts related to: Taking Medication    Continue education with the following diabetes management " concepts: Healthy Eating, Being Active, Monitoring, Taking Medication, Problem Solving, Reducing Risks, and Healthy Coping    Based on learning assessment above, most appropriate setting for further diabetes education would be: Individual setting.      PLAN     Aim to eat 3 meals/day, no longer than 4-5 hours between meals (except for the 12 hour fast overnight).  Use calorieking.com to help with carbohydrate counting  Aim to be as active as possible.  Ideally 20-30 minutes 5 days/week.  Take Lantus 27 units at breakfast and Novolog as prescibed.  Test your BG 3-4 time/day alternating testing times: fasting or before or 2 hrs after the start of the meal.  Record.  If you have 2 or more readings less than    6.  Follow up with Rell Byrd as directed.  7.  Follow up with Yuki Hendrix on 8/15/23 at 1pm.   8.  Follow up with Diabetes Education 8/16/23 at 8am.      60 minutes    See Care Plan for co-developed, patient-state behavior change goals.  AVS provided for patient today.    Education Materials Provided:  Carbohydrate Counting and My Plate Planner      SUBJECTIVE/OBJECTIVE:  Presents for: Individual review  Accompanied by: Self, Significant other  Diabetes education in the past 24mo: No  Focus of Visit: Taking Medication, Monitoring  Diabetes type: Other (states was diagnosed T2 when 18 years old)  Date of diagnosis: 15 years ago (?2008)  Disease course: Getting harder to manage (just learning more about how to manage)  Diabetes management related comments/concerns: wants dietary information  Difficulty affording diabetes medication?: Yes  Difficulty affording diabetes testing supplies?: Yes  Other concerns:: None  Cultural Influences/Ethnic Background:  Not  or     Diabetes Symptoms & Complications:  Fatigue: Yes  Neuropathy: Yes  Polydipsia: No  Polyphagia: No  Polyuria: No  Visual change: Yes  Slow healing wounds: Yes  Weight trend: Stable       Patient Problem List and Family Medical History  "reviewed for relevant medical history, current medical status, and diabetes risk factors.    Vitals:  There were no vitals taken for this visit.  Estimated body mass index is 29.04 kg/m  as calculated from the following:    Height as of 7/15/23: 1.651 m (5' 5\").    Weight as of 7/20/23: 79.2 kg (174 lb 8 oz).   Last 3 BP:   BP Readings from Last 3 Encounters:   07/20/23 (!) 147/90   10/03/22 (!) 136/95   06/01/22 (!) 166/94       History   Smoking Status    Every Day    Packs/day: 1.00    Types: Cigarettes   Smokeless Tobacco    Never       Labs:  Lab Results   Component Value Date    A1C 14.6 07/15/2023    A1C 10.1 05/20/2016    HEMOGLOBINA1 6.7 07/15/2010     Lab Results   Component Value Date     07/20/2023     06/01/2022     05/20/2016     Lab Results   Component Value Date    LDL  06/01/2022      Comment:      Cannot estimate LDL when triglyceride exceeds 400 mg/dL    LDL 86 06/01/2022    LDL  05/20/2016     Cannot estimate LDL when triglyceride exceeds 400 mg/dL     05/20/2016     HDL Cholesterol   Date Value Ref Range Status   05/20/2016 22 (L) >39 mg/dL Final     Direct Measure HDL   Date Value Ref Range Status   06/01/2022 25 (L) >=40 mg/dL Final   ]  GFR Estimate   Date Value Ref Range Status   07/19/2023 >90 >60 mL/min/1.73m2 Final   05/20/2016 >90  Non  GFR Calc   >60 mL/min/1.7m2 Final     GFR Estimate If Black   Date Value Ref Range Status   05/20/2016 >90   GFR Calc   >60 mL/min/1.7m2 Final     Lab Results   Component Value Date    CR 1.10 07/19/2023    CR 0.66 05/20/2016     No results found for: MICROALBUMIN    Healthy Eating:  Healthy Eating Assessed Today: Yes  Cultural/Yarsanism diet restrictions?: No  Meal planning/habits: Carb counting  How many times a week on average do you eat food made away from home (restaurant/take-out)?: 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))  Meals include: Breakfast, Lunch, Dinner  Breakfast: 9-10am:  chepel " of cereal or cup of yogurt, diet soda or water (aims for 30-40 grams of carb)  Lunch: 2pm: bologna sandwich or hungryman meal (chicken & potatoes) (aims for 60-70 grams of carb), diet pop  Dinner: 7-8pm: (aims for 60-80 grams of carb), diet pop  Snacks: HS: turkey meat stick, water  Beverages: Diet soda, Water    Being Active:  Being Active Assessed Today: Yes  Exercise:: Currently not exercising (works at [employer] so on feet all shift)  Barrier to exercise: None    Monitoring:  Monitoring Assessed Today: Yes  Did patient bring glucose meter to appointment? : Yes  Blood Glucose Meter: Accu-chek  Times checking blood sugar at home (number): 3 (3-4)  Times checking blood sugar at home (per): Day    Date Breakfast  Lunch  Dinner  Bedtime    Before After Before After Before After    7/26/23 125         7/25 126  79  84  152   7/24 113  193  99     7/23 135  130  93     7/22 186  178  106     7/21 120  116  180  193   7/20   135  180  145   62% in target, ranging       Taking Medications:  Diabetes Medication(s)       Insulin       insulin aspart (NOVOLOG FLEXPEN) 100 UNIT/ML pen    1 units per 10 gram carb unit before breakfast, lunch and dinner     insulin glargine (LANTUS PEN) 100 UNIT/ML pen    Inject 27 Units Subcutaneous every morning (before breakfast)            Taking Medication Assessed Today: Yes  Current Treatments: Insulin Injections  Dose schedule: Pre-breakfast, Pre-lunch, Pre-dinner  Given by: Patient, Significant other  Injection/Infusion sites: Arms, Abdomen (states rotates sites and changes needle everytime)    Problem Solving:                 Reducing Risks:  Reducing Risks Assessed Today: Yes  Diabetes Risks: Family History  CAD Risks: Diabetes Mellitus, Male sex, Family history  Has dilated eye exam at least once a year?: Yes  Sees dentist every 6 months?: No  Feet checked by healthcare provider in the last year?: Yes    Healthy Coping:  Healthy Coping Assessed Today: Yes  Emotional response  to diabetes: Acceptance, Ready to learn  Informal Support system:: Significant other  Stage of change: ACTION (Actively working towards change)  Patient Activation Measure Survey Score:      5/20/2016     4:32 PM   JOYCE Score (Last Two)   JOYCE Raw Score 41   Activation Score 63.2   JOYCE Level 3         Care Plan and Education Provided:  Care Plan: Diabetes   Updates made by Rosa Wilson since 7/26/2023 12:00 AM        Problem: HbA1C Not In Goal         Goal: Establish Regular Follow-Ups with PCP         Task: Discuss with PCP the recommended timing for patient's next follow up visit(s)    Responsible User: Rosa Wilson        Task: Discuss schedule for PCP visits with patient    Responsible User: Rosa Wilson        Goal: Get HbA1C Level in Goal         Task: Educate patient on diabetes education self-management topics    Responsible User: Rosa Wilson        Task: Educate patient on benefits of regular glucose monitoring    Responsible User: Rosa Wilson        Task: Refer patient to appropriate extended care team member, as needed (Medication Therapy Management, Behavioral Health, Physical Therapy, etc.)    Responsible User: Rosa Wilson        Task: Discuss diabetes treatment plan with patient    Responsible User: Rosa Wilson        Problem: Diabetes Self-Management Education Needed to Optimize Self-Care Behaviors         Goal: Understand diabetes pathophysiology and disease progression         Task: Provide education on diabetes pathophysiology and disease progression specfic to patient's diabetes type    Responsible User: Rosa Wilson        Goal: Healthy Eating - follow a healthy eating pattern for diabetes    This Visit's Progress: 70%        Task: Provide education on portion control and consistency in amount, composition and timing of food intake    Responsible User: Rosa Wilson        Task: Provide education on managing carbohydrate intake (carbohydrate counting, plate  planning method, etc.)    Responsible User: Rosa Wilson        Task: Provide education on weight management    Responsible User: Rosa Wilson        Task: Provide education on heart healthy eating    Responsible User: Rosa Wilson        Task: Provide education on eating out    Responsible User: Rosa Wilson        Task: Develop individualized healthy eating plan with patient    Responsible User: Rosa Wilson        Goal: Being Active - get regular physical activity, working up to at least 150 minutes per week         Task: Provide education on relationship of activity to glucose and precautions to take if at risk for low glucose    Responsible User: Rosa Wilson        Task: Discuss barriers to physical activity with patient    Responsible User: Rosa Wilson        Task: Develop physical activity plan with patient    Responsible User: Rosa Wilson        Task: Explore community resources including walking groups, assistance programs, and home videos    Responsible User: Rosa Wilson        Goal: Monitoring - monitor glucose and ketones as directed    This Visit's Progress: 80%        Task: Provide education on blood glucose monitoring (purpose, proper technique, frequency, glucose targets, interpreting results, when to use glucose control solution, sharps disposal)    Responsible User: Rosa Wilson        Task: Provide education on continuous glucose monitoring (sensor placement, use of bev or /reader, understanding glucose trends, alerts and alarms, differences between sensor glucose and blood glucose)    Responsible User: Rosa Wilson        Task: Provide education on ketone monitoring (when to monitor, frequency, etc.)    Responsible User: Rosa Wilson        Goal: Taking Medication - patient is consistently taking medications as directed    This Visit's Progress: 100%        Task: Provide education on action of prescribed medication, including when to  take and possible side effects    Responsible User: Rosa Wilson        Task: Provide education on insulin and injectable diabetes medications, including administration, storage, site selection and rotation for injection sites Completed 7/26/2023   Responsible User: Rosa Wilson        Task: Discuss barriers to medication adherence with patient and provide management technique ideas as appropriate    Responsible User: Rosa Wilson        Task: Provide education on frequency and refill details of medications    Responsible User: Rosa Wilson        Goal: Problem Solving - know how to prevent and manage short-term diabetes complications    This Visit's Progress: 30%        Task: Provide education on high blood glucose - causes, signs/symptoms, prevention and treatment Completed 7/26/2023   Responsible User: Rosa Wilson        Task: Provide education on low blood glucose - causes, signs/symptoms, prevention, treatment, carrying a carbohydrate source at all times, and medical identification    Responsible User: Rosa Wilson        Task: Provide education on safe travel with diabetes    Responsible User: Rosa Wilson        Task: Provide education on how to care for diabetes on sick days    Responsible User: Rosa Wilson        Task: Provide education on when to call a health care provider    Responsible User: Rosa Wilson        Goal: Reducing Risks - know how to prevent and treat long-term diabetes complications         Task: Provide education on major complications of diabetes, prevention, early diagnostic measures and treatment of complications    Responsible User: Rosa Wilson        Task: Provide education on recommended care for dental, eye and foot health    Responsible User: Rosa Wilson        Task: Provide education on Hemoglobin A1c - goals and relationship to blood glucose levels    Responsible User: Rosa Wilson        Task: Provide education on  recommendations for heart health - lipid levels and goals, blood pressure and goals, and aspirin therapy, if indicated    Responsible User: Rosa Wilson        Task: Provide education on tobacco cessation    Responsible User: Rosa Wilson        Goal: Healthy Coping - use available resources to cope with the challenges of managing diabetes         Task: Discuss recognizing feelings about having diabetes    Responsible User: Rosa Wilson        Task: Provide education on the benefits of making appropriate lifestyle changes    Responsible User: Rosa Wilson        Task: Provide education on benefits of utilizing support systems    Responsible User: Rosa Wilson        Task: Discuss methods for coping with stress    Responsible User: Rosa Wilson        Task: Provide education on when to seek professional counseling    Responsible User: Rosa Wilson RD, [facility], 3:04 PM, 7/26/2023      Time Spent: 60 minutes  Encounter Type: Individual    Any diabetes medication dose changes were made via the CDE Protocol per the patient's referring provider. A copy of this encounter was shared with the provider.

## 2023-07-26 NOTE — LETTER
"    7/26/2023         RE: Ubaldo Zamora  [id number] [facility] [id number]        Dear Colleague,    Thank you for referring your patient, Ubaldo Zamora, to the [facility]. Please see a copy of my visit note below.    Diabetes Self-Management Education & Support    Presents for: Individual review    Type of Service: Telephone Visit    Originating Location (Patient Location): Home (virtual visit not possible, had a phone visit)  Distant Location (Provider Location): [facility]  Mode of Communication:  Telephone    Telephone Visit Start Time:  11:17am  Telephone Visit End Time (telephone visit stop time): 12:15pm    How would patient like to obtain AVS? MyChart    ASSESSMENT:  -type 2? Diabetes, patient reports diagnosed age 18, 15 years ago  -recent a1c of 14.6 (in 2016 10.1)  -currently being managed with Lantus and Novolog (when first diagnosed was on Metformin, put on insulin some time after that but has not been taking insulin for years \"my own stupidity\")  -not on aspirin nor statin therapy  -diabetes complications:  DKA.  History of pancreatitis.  -last seen by diabetes education ?  -home BG monitoring reveals:  62% in target since being released from hospital    Primary concern: states has dietary questions.  Significant other is with today.  Reports he is counting carbohydrates and aiming for 30-80 grams of carbohydrate/meal and dosing Novolog based on that.      Discussed:  target BG, onset/peak/duration of basal and bolus insulin, basic carbohydrate counting, meal plan, impact of activity/exercise (as he starts to feel better and risk of hypoglycemia), hypoglycemia symptoms and treatment, cost of medications (he has no insurance, will limit visits to a minimum until he is able to get on insurance).  Patient expressed understanding.      Patient's most recent   Lab Results   Component Value Date    A1C 14.6 07/15/2023    A1C 10.1 05/20/2016    " HEMOGLOBINA1 6.7 07/15/2010     is not meeting goal of <7.0    Diabetes knowledge and skills assessment:   Patient is knowledgeable in diabetes management concepts related to: Taking Medication    Continue education with the following diabetes management concepts: Healthy Eating, Being Active, Monitoring, Taking Medication, Problem Solving, Reducing Risks, and Healthy Coping    Based on learning assessment above, most appropriate setting for further diabetes education would be: Individual setting.      PLAN     Aim to eat 3 meals/day, no longer than 4-5 hours between meals (except for the 12 hour fast overnight).  Use calorieking.com to help with carbohydrate counting  Aim to be as active as possible.  Ideally 20-30 minutes 5 days/week.  Take Lantus 27 units at breakfast and Novolog as prescibed.  Test your BG 3-4 time/day alternating testing times: fasting or before or 2 hrs after the start of the meal.  Record.  If you have 2 or more readings less than    6.  Follow up with Rell Byrd as directed.  7.  Follow up with Yuki Hendrix on 8/15/23 at 1pm.   8.  Follow up with Diabetes Education 8/16/23 at 8am.      60 minutes    See Care Plan for co-developed, patient-state behavior change goals.  AVS provided for patient today.    Education Materials Provided:  Carbohydrate Counting and My Plate Planner      SUBJECTIVE/OBJECTIVE:  Presents for: Individual review  Accompanied by: Self, Significant other  Diabetes education in the past 24mo: No  Focus of Visit: Taking Medication, Monitoring  Diabetes type: Other (states was diagnosed T2 when 18 years old)  Date of diagnosis: 15 years ago (?2008)  Disease course: Getting harder to manage (just learning more about how to manage)  Diabetes management related comments/concerns: wants dietary information  Difficulty affording diabetes medication?: Yes  Difficulty affording diabetes testing supplies?: Yes  Other concerns:: None  Cultural Influences/Ethnic Background:  Not  " or     Diabetes Symptoms & Complications:  Fatigue: Yes  Neuropathy: Yes  Polydipsia: No  Polyphagia: No  Polyuria: No  Visual change: Yes  Slow healing wounds: Yes  Weight trend: Stable       Patient Problem List and Family Medical History reviewed for relevant medical history, current medical status, and diabetes risk factors.    Vitals:  There were no vitals taken for this visit.  Estimated body mass index is 29.04 kg/m  as calculated from the following:    Height as of 7/15/23: 1.651 m (5' 5\").    Weight as of 7/20/23: 79.2 kg (174 lb 8 oz).   Last 3 BP:   BP Readings from Last 3 Encounters:   07/20/23 (!) 147/90   10/03/22 (!) 136/95   06/01/22 (!) 166/94       History   Smoking Status     Every Day     Packs/day: 1.00     Types: Cigarettes   Smokeless Tobacco     Never       Labs:  Lab Results   Component Value Date    A1C 14.6 07/15/2023    A1C 10.1 05/20/2016    HEMOGLOBINA1 6.7 07/15/2010     Lab Results   Component Value Date     07/20/2023     06/01/2022     05/20/2016     Lab Results   Component Value Date    LDL  06/01/2022      Comment:      Cannot estimate LDL when triglyceride exceeds 400 mg/dL    LDL 86 06/01/2022    LDL  05/20/2016     Cannot estimate LDL when triglyceride exceeds 400 mg/dL     05/20/2016     HDL Cholesterol   Date Value Ref Range Status   05/20/2016 22 (L) >39 mg/dL Final     Direct Measure HDL   Date Value Ref Range Status   06/01/2022 25 (L) >=40 mg/dL Final   ]  GFR Estimate   Date Value Ref Range Status   07/19/2023 >90 >60 mL/min/1.73m2 Final   05/20/2016 >90  Non  GFR Calc   >60 mL/min/1.7m2 Final     GFR Estimate If Black   Date Value Ref Range Status   05/20/2016 >90   GFR Calc   >60 mL/min/1.7m2 Final     Lab Results   Component Value Date    CR 1.10 07/19/2023    CR 0.66 05/20/2016     No results found for: MICROALBUMIN    Healthy Eating:  Healthy Eating Assessed Today: Yes  Cultural/Islam " diet restrictions?: No  Meal planning/habits: Carb counting  How many times a week on average do you eat food made away from home (restaurant/take-out)?: 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))  Meals include: Breakfast, Lunch, Dinner  Breakfast: 9-10am:  bowl of cereal or cup of yogurt, diet soda or water (aims for 30-40 grams of carb)  Lunch: 2pm: bologna sandwich or hungryman meal (chicken & potatoes) (aims for 60-70 grams of carb), diet pop  Dinner: 7-8pm: (aims for 60-80 grams of carb), diet pop  Snacks: HS: turkey meat stick, water  Beverages: Diet soda, Water    Being Active:  Being Active Assessed Today: Yes  Exercise:: Currently not exercising (works at [employer] so on feet all shift)  Barrier to exercise: None    Monitoring:  Monitoring Assessed Today: Yes  Did patient bring glucose meter to appointment? : Yes  Blood Glucose Meter: Accu-chek  Times checking blood sugar at home (number): 3 (3-4)  Times checking blood sugar at home (per): Day    Date Breakfast  Lunch  Dinner  Bedtime    Before After Before After Before After    7/26/23 125         7/25 126  79  84  152   7/24 113  193  99     7/23 135  130  93     7/22 186  178  106     7/21 120  116  180  193   7/20   135  180  145   62% in target, ranging       Taking Medications:  Diabetes Medication(s)       Insulin       insulin aspart (NOVOLOG FLEXPEN) 100 UNIT/ML pen    1 units per 10 gram carb unit before breakfast, lunch and dinner     insulin glargine (LANTUS PEN) 100 UNIT/ML pen    Inject 27 Units Subcutaneous every morning (before breakfast)            Taking Medication Assessed Today: Yes  Current Treatments: Insulin Injections  Dose schedule: Pre-breakfast, Pre-lunch, Pre-dinner  Given by: Patient, Significant other  Injection/Infusion sites: Arms, Abdomen (states rotates sites and changes needle everytime)    Problem Solving:                 Reducing Risks:  Reducing Risks Assessed Today: Yes  Diabetes Risks: Family History  CAD  Risks: Diabetes Mellitus, Male sex, Family history  Has dilated eye exam at least once a year?: Yes  Sees dentist every 6 months?: No  Feet checked by healthcare provider in the last year?: Yes    Healthy Coping:  Healthy Coping Assessed Today: Yes  Emotional response to diabetes: Acceptance, Ready to learn  Informal Support system:: Significant other  Stage of change: ACTION (Actively working towards change)  Patient Activation Measure Survey Score:      5/20/2016     4:32 PM   JOYCE Score (Last Two)   JOYCE Raw Score 41   Activation Score 63.2   JOYCE Level 3         Care Plan and Education Provided:  Care Plan: Diabetes   Updates made by Rosa Wilson since 7/26/2023 12:00 AM        Problem: HbA1C Not In Goal         Goal: Establish Regular Follow-Ups with PCP         Task: Discuss with PCP the recommended timing for patient's next follow up visit(s)    Responsible User: Rosa Wilson        Task: Discuss schedule for PCP visits with patient    Responsible User: Rosa Wilson        Goal: Get HbA1C Level in Goal         Task: Educate patient on diabetes education self-management topics    Responsible User: Rosa Wilson        Task: Educate patient on benefits of regular glucose monitoring    Responsible User: Rosa Wilson        Task: Refer patient to appropriate extended care team member, as needed (Medication Therapy Management, Behavioral Health, Physical Therapy, etc.)    Responsible User: Rosa Wilson        Task: Discuss diabetes treatment plan with patient    Responsible User: Rosa Wilson        Problem: Diabetes Self-Management Education Needed to Optimize Self-Care Behaviors         Goal: Understand diabetes pathophysiology and disease progression         Task: Provide education on diabetes pathophysiology and disease progression specfic to patient's diabetes type    Responsible User: Rosa Wilson        Goal: Healthy Eating - follow a healthy eating pattern for diabetes    This  Visit's Progress: 70%        Task: Provide education on portion control and consistency in amount, composition and timing of food intake    Responsible User: Rosa Wilson        Task: Provide education on managing carbohydrate intake (carbohydrate counting, plate planning method, etc.)    Responsible User: Rosa Wilson        Task: Provide education on weight management    Responsible User: Rosa Wilson        Task: Provide education on heart healthy eating    Responsible User: Rosa Wilson        Task: Provide education on eating out    Responsible User: Rosa Wilson        Task: Develop individualized healthy eating plan with patient    Responsible User: Rosa Wilson        Goal: Being Active - get regular physical activity, working up to at least 150 minutes per week         Task: Provide education on relationship of activity to glucose and precautions to take if at risk for low glucose    Responsible User: Rosa Wilson        Task: Discuss barriers to physical activity with patient    Responsible User: Rosa Wilson        Task: Develop physical activity plan with patient    Responsible User: Rosa Wilson        Task: Explore community resources including walking groups, assistance programs, and home videos    Responsible User: Rosa Wilson        Goal: Monitoring - monitor glucose and ketones as directed    This Visit's Progress: 80%        Task: Provide education on blood glucose monitoring (purpose, proper technique, frequency, glucose targets, interpreting results, when to use glucose control solution, sharps disposal)    Responsible User: Rosa Wilson        Task: Provide education on continuous glucose monitoring (sensor placement, use of bev or /reader, understanding glucose trends, alerts and alarms, differences between sensor glucose and blood glucose)    Responsible User: Rosa Wilson        Task: Provide education on ketone monitoring (when to  monitor, frequency, etc.)    Responsible User: Rosa Wilson        Goal: Taking Medication - patient is consistently taking medications as directed    This Visit's Progress: 100%        Task: Provide education on action of prescribed medication, including when to take and possible side effects    Responsible User: Rosa Wilson        Task: Provide education on insulin and injectable diabetes medications, including administration, storage, site selection and rotation for injection sites Completed 7/26/2023   Responsible User: Rosa Wilson        Task: Discuss barriers to medication adherence with patient and provide management technique ideas as appropriate    Responsible User: Rosa Wilson        Task: Provide education on frequency and refill details of medications    Responsible User: Rosa Wilson        Goal: Problem Solving - know how to prevent and manage short-term diabetes complications    This Visit's Progress: 30%        Task: Provide education on high blood glucose - causes, signs/symptoms, prevention and treatment Completed 7/26/2023   Responsible User: Rosa Wilson        Task: Provide education on low blood glucose - causes, signs/symptoms, prevention, treatment, carrying a carbohydrate source at all times, and medical identification    Responsible User: Rosa Wilson        Task: Provide education on safe travel with diabetes    Responsible User: Rosa Wilson        Task: Provide education on how to care for diabetes on sick days    Responsible User: Rosa Wilson        Task: Provide education on when to call a health care provider    Responsible User: Rosa Wilson        Goal: Reducing Risks - know how to prevent and treat long-term diabetes complications         Task: Provide education on major complications of diabetes, prevention, early diagnostic measures and treatment of complications    Responsible User: Rosa Wilson        Task: Provide education on  recommended care for dental, eye and foot health    Responsible User: Rosa Wilson        Task: Provide education on Hemoglobin A1c - goals and relationship to blood glucose levels    Responsible User: Rosa Wilson        Task: Provide education on recommendations for heart health - lipid levels and goals, blood pressure and goals, and aspirin therapy, if indicated    Responsible User: Rosa Wilson        Task: Provide education on tobacco cessation    Responsible User: Rosa Wilson        Goal: Healthy Coping - use available resources to cope with the challenges of managing diabetes         Task: Discuss recognizing feelings about having diabetes    Responsible User: Rosa Wilson        Task: Provide education on the benefits of making appropriate lifestyle changes    Responsible User: Rosa Wilson        Task: Provide education on benefits of utilizing support systems    Responsible User: Rosa Wislon        Task: Discuss methods for coping with stress    Responsible User: oRsa Wilson        Task: Provide education on when to seek professional counseling    Responsible User: Rosa Wilson RD, [facility], 3:04 PM, 7/26/2023      Time Spent: 60 minutes  Encounter Type: Individual    Any diabetes medication dose changes were made via the CDE Protocol per the patient's referring provider. A copy of this encounter was shared with the provider.

## 2023-08-15 ENCOUNTER — OFFICE VISIT (OUTPATIENT)
Dept: ENDOCRINOLOGY | Facility: CLINIC | Age: 33
End: 2023-08-15
Attending: PHYSICIAN ASSISTANT

## 2023-08-15 ENCOUNTER — TELEPHONE (OUTPATIENT)
Dept: ENDOCRINOLOGY | Facility: CLINIC | Age: 33
End: 2023-08-15

## 2023-08-15 ENCOUNTER — TELEPHONE (OUTPATIENT)
Dept: UROLOGY | Facility: CLINIC | Age: 33
End: 2023-08-15

## 2023-08-15 VITALS
OXYGEN SATURATION: 99 % | TEMPERATURE: 98.3 F | RESPIRATION RATE: 16 BRPM | BODY MASS INDEX: 28.39 KG/M2 | DIASTOLIC BLOOD PRESSURE: 91 MMHG | HEART RATE: 83 BPM | SYSTOLIC BLOOD PRESSURE: 137 MMHG | HEIGHT: 65 IN | WEIGHT: 170.4 LBS

## 2023-08-15 DIAGNOSIS — E11.628 TYPE 2 DIABETES MELLITUS WITH OTHER SKIN COMPLICATION, WITHOUT LONG-TERM CURRENT USE OF INSULIN (H): ICD-10-CM

## 2023-08-15 DIAGNOSIS — E11.65 TYPE 2 DIABETES MELLITUS WITH HYPERGLYCEMIA, WITH LONG-TERM CURRENT USE OF INSULIN (H): Primary | ICD-10-CM

## 2023-08-15 DIAGNOSIS — Z79.4 TYPE 2 DIABETES MELLITUS WITH HYPERGLYCEMIA, WITH LONG-TERM CURRENT USE OF INSULIN (H): Primary | ICD-10-CM

## 2023-08-15 PROCEDURE — 99204 OFFICE O/P NEW MOD 45 MIN: CPT | Performed by: PHYSICIAN ASSISTANT

## 2023-08-15 RX ORDER — LISINOPRIL 20 MG/1
20 TABLET ORAL DAILY
Qty: 90 TABLET | Refills: 1 | Status: SHIPPED | OUTPATIENT
Start: 2023-08-15 | End: 2024-02-19

## 2023-08-15 RX ORDER — INSULIN ASPART 100 [IU]/ML
INJECTION, SOLUTION INTRAVENOUS; SUBCUTANEOUS
Qty: 15 ML | Refills: 1 | Status: SHIPPED | OUTPATIENT
Start: 2023-08-15

## 2023-08-15 ASSESSMENT — ENCOUNTER SYMPTOMS
EYE WATERING: 0
ALTERED TEMPERATURE REGULATION: 0
EXERCISE INTOLERANCE: 0
HYPOTENSION: 0
DOUBLE VISION: 0
INCREASED ENERGY: 1
POOR WOUND HEALING: 0
SKIN CHANGES: 0
SYNCOPE: 0
POLYDIPSIA: 0
SLEEP DISTURBANCES DUE TO BREATHING: 0
POLYPHAGIA: 1
FEVER: 0
WEIGHT LOSS: 0
EYE REDNESS: 0
DECREASED APPETITE: 0
PALPITATIONS: 1
WEIGHT GAIN: 1
LIGHT-HEADEDNESS: 0
FATIGUE: 1
CHILLS: 0
EYE PAIN: 0
ORTHOPNEA: 0
EYE IRRITATION: 0
NAIL CHANGES: 0
NIGHT SWEATS: 0
HYPERTENSION: 1
HALLUCINATIONS: 0
LEG PAIN: 1

## 2023-08-15 NOTE — PROGRESS NOTES
Assessment/Plan :   Type 2 DM. Connor has had uncontrolled diabetes for many years. He is understands the risks and complications associated with long term hyperglycemia. He is motivated to get his blood sugars under better control. He has been taking Lantus every morning, as directed. He has also been really working hard to carb count and adjust the insulin aspart as needed. He feels like the 1 unit(s) for every 10 grams of carbs was too much, so he decreased it to 1 unit(s) for every 15 grams. He is doing a great job at monitoring his blood sugars consistently, I encouraged him to keep up the good work. We discussed CGMS therapy and I will give him a sample of the FreeStyle Laura 2. He will download the bev later tonight, if he has any problems, he is to contact our office. He currently is without insurance. I would like to check a c-peptide level but I will wait for follow-up laboratory testing. He is hoping to have insurance within the month. We will plan on a follow-up appt in 2 mos.       I have independently reviewed and interpreted labs, imaging as indicated.      Chief complaint:  Ubaldo is a 33 year old male who comes to our office to establish care for uncontrolled Type 2 DM.    I have reviewed Care Everywhere including [facility], [facility], [facility], [facility], [facility] , [facility], [place] lab reports, imaging reports and provider notes as indicated.      HISTORY OF PRESENT ILLNESS  Connor was originally diagnosed with diabetes in 2008 at the age of 18. He states that he was started on metformin at the time of diagnosis. The metformin seemed to work for a while but, over time, his blood sugars started to increase and he was started on insulin. He admits that over the years he has not been consistent in administering his insulin. He always took metformin but he was hit or miss with insulin. He has also not been consistent with glucose monitoring.    Recently, he developed pain  and swelling in his scrotum and he went to the emergency room for further evaluation. He was found to have perineal cellulitis with sepsis. His blood sugar at the time of arrival was over 500 mg/dl. He was admitted to the hospital where he was given antibiotics to manage the infection and MDI insulin therapy was initiated. He was discharged with a prescription for Lantus 27 unit(s) daily and insulin aspart before meals based on a carb ratio of 1 unit(s) for every 10 grams of carbs. He was also given a new prescription for a glucometer. He has remained off metformin and he has continued with MDI insulin therapy. He is currently monitoring his blood sugars 3x daily.    Connor has had problems with blurred vision and numbness/tingling in his feet. He has been told, in the past, that he has some changes indicative of retinopathy. His vision has gotten blurrier over the last month. He has also struggled with numbness that occurs in certain positions. Due to ongoing scrotal swelling and pain, he often rests his ankle on his knee, and then he'll develop numbness in his foot. He often wakes up with his hand numb. This seems to have worsened over the last few months. Lastly, he does have microalbuminuria and he currently takes lisinopril.     Endocrine relevant labs are as follows:   Latest Reference Range & Units 07/15/23 05:50   Hemoglobin A1C <5.7 % 14.6 (H)   (H): Data is abnormally high     Latest Reference Range & Units 07/15/23 06:51   Albumin Urine mg/g Cr 0.00 - 17.00 mg/g Cr 726.32 (H)   (H): Data is abnormally high     Latest Reference Range & Units 07/15/23 06:51   Albumin Urine mg/L mg/L 276.0       REVIEW OF SYSTEMS  Answers submitted by the patient for this visit:  Symptoms you have experienced in the last 30 days (Submitted on 8/15/2023)  General Symptoms: Yes  Skin Symptoms: Yes  HENT Symptoms: No  EYE SYMPTOMS: Yes  HEART SYMPTOMS: Yes  LUNG SYMPTOMS: No  INTESTINAL SYMPTOMS: No  URINARY SYMPTOMS:  No  REPRODUCTIVE SYMPTOMS: No  SKELETAL SYMPTOMS: No  BLOOD SYMPTOMS: No  NERVOUS SYSTEM SYMPTOMS: No  MENTAL HEALTH SYMPTOMS: No  Please answer the questions below to tell us what conditions you are experiencing: (Submitted on 8/15/2023)  Fever: No  Loss of appetite: No  Weight loss: No  Weight gain: Yes  Fatigue: Yes  Night sweats: No  Chills: No  Increased stress: No  Excessive hunger: Yes  Excessive thirst: No  Feeling hot or cold when others believe the temperature is normal: No  Loss of height: No  Post-operative complications: No  Surgical site pain: No  Hallucinations: No  Change in or Loss of Energy: Yes  Hyperactivity: No  Confusion: No   (Submitted on 8/15/2023)  Changes in hair: No  Changes in moles/birth marks: No  Itching: Yes  Rashes: Yes  Changes in nails: No  Acne: No  Change in facial hair: No  Warts: No  Non-healing sores: No  Scarring: No  Flaking of skin: Yes  Color changes of hands/feet in cold : No  Sun sensitivity: No  Skin thickening: No  Please answer the questions below to tell us what conditions you are experiencing: (Submitted on 8/15/2023)  Eye pain: No  Vision loss: Yes  Dry eyes: No  Watery eyes: No  Eye bulging: No  Double vision: No  Flashing of lights: No  Spots: No  Floaters: Yes  Redness: No  Crossed eyes: No  Tunnel Vision: No  Yellowing of eyes: No  Eye irritation: No  Please answer the questions below to tell us what condition you are experiencing: (Submitted on 8/15/2023)  Chest pain or pressure: No  Fast or irregular heartbeat: Yes  Pain in legs with walking: Yes  Trouble breathing while lying down: No  Fingers or toes appear blue: No  High blood pressure: Yes  Low blood pressure: No  Fainting: No  Murmurs: No  Pacemaker: No  Varicose veins: No  Edema or swelling: No  Wake up at night with shortness of breath: No  Light-headedness: No  Exercise intolerance: No    Endocrine: positive for diabetes  Skin: positive for cellulitis, negative for, hair loss on scalp, acne on face,  hyperpigmentation  Eyes: positive for visual blurring, negative for, redness, tearing, itching  Ears/Nose/Throat: negative  Respiratory: No shortness of breath, dyspnea on exertion, cough, or hemoptysis  Cardiovascular: negative for, irregular heart beat, lower extremity edema, and exercise intolerance  Gastrointestinal: negative for, nausea, vomiting, constipation, and diarrhea  Genitourinary: negative for, nocturia, dysuria, frequency, and urgency  Musculoskeletal: positive for fatigue, negative for, muscular weakness, and nocturnal cramping  Neurologic: positive for local weakness, numbness or tingling of hands, and numbness or tingling of feet  Psychiatric: negative  Hematologic/Lymphatic/Immunologic: negative    Past Medical History  Past Medical History:   Diagnosis Date    Diabetes (H)     type 2 diabetes    Diabetes mellitus type 2 with complications (H)     history of DKA, recurring infections       Medications  Current Outpatient Medications   Medication Sig Dispense Refill    alcohol swab prep pads Use to swab area of injection/kelechi as directed. 100 each 3    blood glucose (NO BRAND SPECIFIED) test strip Use to test blood sugar daily or as directed. To accompany: Blood Glucose Monitor Brands: per insurance. 300 strip 11    blood glucose calibration (NO BRAND SPECIFIED) solution To accompany: Blood Glucose Monitor Brands: per insurance. 1 each 3    blood glucose monitoring (NO BRAND SPECIFIED) meter device kit Use to test blood sugar with meals, at bedtime, and PRN 1 kit 0    blood glucose monitoring (NO BRAND SPECIFIED) meter device kit Use to test blood sugar as directed. 1 kit 0    Continuous Blood Gluc  (FREESTYLE GEOVANY 2 READER) EDDIE Use to read blood sugars as per 's instructions. 1 each 0    Continuous Blood Gluc Sensor (FREESTYLE GEOVANY 2 SENSOR) MISC Change every 14 days. 2 each 5    Glucagon (GVOKE HYPOPEN) 1 MG/0.2ML pen Inject the contents of 1 device under the skin into  lower abdomen, outer thigh, or outer upper arm as needed for hypoglycemia. If no response after 15 minutes, additional 1 mg dose from a new device may be injected while waiting for emergency assistance. 0.4 mL 1    insulin aspart (NOVOLOG FLEXPEN) 100 UNIT/ML pen 1 units per 15 gram carb unit before breakfast, lunch and dinner 15 mL 1    insulin glargine (LANTUS PEN) 100 UNIT/ML pen Inject 27 Units Subcutaneous every morning (before breakfast) 15 mL 1    lisinopril (ZESTRIL) 20 MG tablet Take 1 tablet (20 mg) by mouth daily 90 tablet 1    Sharps Container MISC 1 Units See Admin Instructions .  Use sharps container as instructed to dispose of needles and lancets. 1 each 11    thin (NO BRAND SPECIFIED) lancets Use with lanceting device. To accompany: Blood Glucose Monitor Brands: per insurance. 100 each 6       Allergies  No Known Allergies      Family History  family history includes Alcoholism in his father; Asthma in his father; Colon Cancer in his maternal grandmother; Coronary Artery Disease in his maternal grandfather, mother, and paternal grandmother; Depression in his father, maternal grandfather, maternal grandmother, and mother; Diabetes in his maternal grandfather, maternal grandmother, mother, paternal grandfather, and paternal grandmother; Heart Disease in his mother; Hyperlipidemia in his father, maternal grandfather, maternal grandmother, and paternal grandmother; Hypertension in his father and mother; Lung Cancer in his father; Mental Illness in his father; Obesity in his mother; Stomach Problem in his mother.    Social History  Social History     Tobacco Use    Smoking status: Every Day     Packs/day: 1.00     Types: Cigarettes    Smokeless tobacco: Never   Vaping Use    Vaping Use: Former   Substance Use Topics    Alcohol use: No     Alcohol/week: 0.0 standard drinks of alcohol    Drug use: Not Currently     Types: Marijuana       Physical Exam  BP (!) 137/91 (BP Location: Left arm, Patient Position:  "Chair, Cuff Size: Adult Regular)   Pulse 83   Temp 98.3  F (36.8  C) (Tympanic)   Resp 16   Ht 1.651 m (5' 5\")   Wt 77.3 kg (170 lb 6.4 oz)   SpO2 99%   BMI 28.36 kg/m    Body mass index is 28.36 kg/m .  GENERAL :  In no apparent distress. He is accompanied by his significant other  SKIN: Normal color, normal temperature, texture.  No hirsutism, alopecia or purple striae.     EYES: PERRL, EOMI, No scleral icterus,  No proptosis, conjunctival redness, stare, retraction  NECK: No visible masses. No palpable adenopathy, or masses. No carotid bruits.   THYROID:  Normal, nontender, smooth / firm texture,  no nodules, no Bruit   RESP: Lungs clear to auscultation bilaterally  CARDIAC: Regular rate and rhythm, normal S1 S2, without murmurs, rubs or gallops    NEURO: awake, alert, responds appropriately to questions.  Cranial nerves intact.   Moves all extremities; Gait normal.  No tremor of the outstretched hand.   EXTREMITIES: No clubbing, cyanosis or edema.    DATA REVIEW  Date Morning Noon Evening   08/15  163    08/14 147 166 134   08/13 149 136 105   08/12 146 244 133   08/11 165 123 187   08/10 188 154 147   08/09 97 211/159 165     Current [address]  mg/dl      "

## 2023-08-15 NOTE — LETTER
8/15/2023         RE: Ubaldo Zamora  [address] [address] [address]        Dear Colleague,    Thank you for referring your patient, Ubaldo Zamora, to the [facility]. Please see a copy of my visit note below.    Assessment/Plan :   Type 2 DM. Connor has had uncontrolled diabetes for many years. He is understands the risks and complications associated with long term hyperglycemia. He is motivated to get his blood sugars under better control. He has been taking Lantus every morning, as directed. He has also been really working hard to carb count and adjust the insulin aspart as needed. He feels like the 1 unit(s) for every 10 grams of carbs was too much, so he decreased it to 1 unit(s) for every 15 grams. He is doing a great job at monitoring his blood sugars consistently, I encouraged him to keep up the good work. We discussed CGMS therapy and I will give him a sample of the FreeStyle Laura 2. He will download the bev later tonight, if he has any problems, he is to contact our office. He currently is without insurance. I would like to check a c-peptide level but I will wait for follow-up laboratory testing. He is hoping to have insurance within the month. We will plan on a follow-up appt in 2 mos.       I have independently reviewed and interpreted labs, imaging as indicated.      Chief complaint:  Ubaldo is a 33 year old male who comes to our office to establish care for uncontrolled Type 2 DM.    I have reviewed Care Everywhere including [facility], [facility], [facility], [facility], [place], [facility], [facility] , [facility], [place] lab reports, imaging reports and provider notes as indicated.      HISTORY OF PRESENT ILLNESS  Connor was originally diagnosed with diabetes in 2008 at the age of 18. He states that he was started on metformin at the time of diagnosis. The metformin seemed to work for a while but, over time, his blood sugars started to increase and he was  started on insulin. He admits that over the years he has not been consistent in administering his insulin. He always took metformin but he was hit or miss with insulin. He has also not been consistent with glucose monitoring.    Recently, he developed pain and swelling in his scrotum and he went to the emergency room for further evaluation. He was found to have perineal cellulitis with sepsis. His blood sugar at the time of arrival was over 500 mg/dl. He was admitted to the hospital where he was given antibiotics to manage the infection and MDI insulin therapy was initiated. He was discharged with a prescription for Lantus 27 unit(s) daily and insulin aspart before meals based on a carb ratio of 1 unit(s) for every 10 grams of carbs. He was also given a new prescription for a glucometer. He has remained off metformin and he has continued with MDI insulin therapy. He is currently monitoring his blood sugars 3x daily.    Connor has had problems with blurred vision and numbness/tingling in his feet. He has been told, in the past, that he has some changes indicative of retinopathy. His vision has gotten blurrier over the last month. He has also struggled with numbness that occurs in certain positions. Due to ongoing scrotal swelling and pain, he often rests his ankle on his knee, and then he'll develop numbness in his foot. He often wakes up with his hand numb. This seems to have worsened over the last few months. Lastly, he does have microalbuminuria and he currently takes lisinopril.     Endocrine relevant labs are as follows:   Latest Reference Range & Units 07/15/23 05:50   Hemoglobin A1C <5.7 % 14.6 (H)   (H): Data is abnormally high     Latest Reference Range & Units 07/15/23 06:51   Albumin Urine mg/g Cr 0.00 - 17.00 mg/g Cr 726.32 (H)   (H): Data is abnormally high     Latest Reference Range & Units 07/15/23 06:51   Albumin Urine mg/L mg/L 276.0       REVIEW OF SYSTEMS  Answers submitted by the patient for this  visit:  Symptoms you have experienced in the last 30 days (Submitted on 8/15/2023)  General Symptoms: Yes  Skin Symptoms: Yes  HENT Symptoms: No  EYE SYMPTOMS: Yes  HEART SYMPTOMS: Yes  LUNG SYMPTOMS: No  INTESTINAL SYMPTOMS: No  URINARY SYMPTOMS: No  REPRODUCTIVE SYMPTOMS: No  SKELETAL SYMPTOMS: No  BLOOD SYMPTOMS: No  NERVOUS SYSTEM SYMPTOMS: No  MENTAL HEALTH SYMPTOMS: No  Please answer the questions below to tell us what conditions you are experiencing: (Submitted on 8/15/2023)  Fever: No  Loss of appetite: No  Weight loss: No  Weight gain: Yes  Fatigue: Yes  Night sweats: No  Chills: No  Increased stress: No  Excessive hunger: Yes  Excessive thirst: No  Feeling hot or cold when others believe the temperature is normal: No  Loss of height: No  Post-operative complications: No  Surgical site pain: No  Hallucinations: No  Change in or Loss of Energy: Yes  Hyperactivity: No  Confusion: No   (Submitted on 8/15/2023)  Changes in hair: No  Changes in moles/birth marks: No  Itching: Yes  Rashes: Yes  Changes in nails: No  Acne: No  Change in facial hair: No  Warts: No  Non-healing sores: No  Scarring: No  Flaking of skin: Yes  Color changes of hands/feet in cold : No  Sun sensitivity: No  Skin thickening: No  Please answer the questions below to tell us what conditions you are experiencing: (Submitted on 8/15/2023)  Eye pain: No  Vision loss: Yes  Dry eyes: No  Watery eyes: No  Eye bulging: No  Double vision: No  Flashing of lights: No  Spots: No  Floaters: Yes  Redness: No  Crossed eyes: No  Tunnel Vision: No  Yellowing of eyes: No  Eye irritation: No  Please answer the questions below to tell us what condition you are experiencing: (Submitted on 8/15/2023)  Chest pain or pressure: No  Fast or irregular heartbeat: Yes  Pain in legs with walking: Yes  Trouble breathing while lying down: No  Fingers or toes appear blue: No  High blood pressure: Yes  Low blood pressure: No  Fainting: No  Murmurs: No  Pacemaker:  No  Varicose veins: No  Edema or swelling: No  Wake up at night with shortness of breath: No  Light-headedness: No  Exercise intolerance: No    Endocrine: positive for diabetes  Skin: positive for cellulitis, negative for, hair loss on scalp, acne on face, hyperpigmentation  Eyes: positive for visual blurring, negative for, redness, tearing, itching  Ears/Nose/Throat: negative  Respiratory: No shortness of breath, dyspnea on exertion, cough, or hemoptysis  Cardiovascular: negative for, irregular heart beat, lower extremity edema, and exercise intolerance  Gastrointestinal: negative for, nausea, vomiting, constipation, and diarrhea  Genitourinary: negative for, nocturia, dysuria, frequency, and urgency  Musculoskeletal: positive for fatigue, negative for, muscular weakness, and nocturnal cramping  Neurologic: positive for local weakness, numbness or tingling of hands, and numbness or tingling of feet  Psychiatric: negative  Hematologic/Lymphatic/Immunologic: negative    Past Medical History  Past Medical History:   Diagnosis Date     Diabetes (H)     type 2 diabetes     Diabetes mellitus type 2 with complications (H)     history of DKA, recurring infections       Medications  Current Outpatient Medications   Medication Sig Dispense Refill     alcohol swab prep pads Use to swab area of injection/kelechi as directed. 100 each 3     blood glucose (NO BRAND SPECIFIED) test strip Use to test blood sugar daily or as directed. To accompany: Blood Glucose Monitor Brands: per insurance. 300 strip 11     blood glucose calibration (NO BRAND SPECIFIED) solution To accompany: Blood Glucose Monitor Brands: per insurance. 1 each 3     blood glucose monitoring (NO BRAND SPECIFIED) meter device kit Use to test blood sugar with meals, at bedtime, and PRN 1 kit 0     blood glucose monitoring (NO BRAND SPECIFIED) meter device kit Use to test blood sugar as directed. 1 kit 0     Continuous Blood Gluc  (FREESTYLE GEOVANY 2 READER)  EDDIE Use to read blood sugars as per 's instructions. 1 each 0     Continuous Blood Gluc Sensor (FREESTYLE GEOVANY 2 SENSOR) MISC Change every 14 days. 2 each 5     Glucagon (GVOKE HYPOPEN) 1 MG/0.2ML pen Inject the contents of 1 device under the skin into lower abdomen, outer thigh, or outer upper arm as needed for hypoglycemia. If no response after 15 minutes, additional 1 mg dose from a new device may be injected while waiting for emergency assistance. 0.4 mL 1     insulin aspart (NOVOLOG FLEXPEN) 100 UNIT/ML pen 1 units per 15 gram carb unit before breakfast, lunch and dinner 15 mL 1     insulin glargine (LANTUS PEN) 100 UNIT/ML pen Inject 27 Units Subcutaneous every morning (before breakfast) 15 mL 1     lisinopril (ZESTRIL) 20 MG tablet Take 1 tablet (20 mg) by mouth daily 90 tablet 1     Sharps Container MISC 1 Units See Admin Instructions .  Use sharps container as instructed to dispose of needles and lancets. 1 each 11     thin (NO BRAND SPECIFIED) lancets Use with lanceting device. To accompany: Blood Glucose Monitor Brands: per insurance. 100 each 6       Allergies  No Known Allergies      Family History  family history includes Alcoholism in his father; Asthma in his father; Colon Cancer in his maternal grandmother; Coronary Artery Disease in his maternal grandfather, mother, and paternal grandmother; Depression in his father, maternal grandfather, maternal grandmother, and mother; Diabetes in his maternal grandfather, maternal grandmother, mother, paternal grandfather, and paternal grandmother; Heart Disease in his mother; Hyperlipidemia in his father, maternal grandfather, maternal grandmother, and paternal grandmother; Hypertension in his father and mother; Lung Cancer in his father; Mental Illness in his father; Obesity in his mother; Stomach Problem in his mother.    Social History  Social History     Tobacco Use     Smoking status: Every Day     Packs/day: 1.00     Types: Cigarettes      "Smokeless tobacco: Never   Vaping Use     Vaping Use: Former   Substance Use Topics     Alcohol use: No     Alcohol/week: 0.0 standard drinks of alcohol     Drug use: Not Currently     Types: Marijuana       Physical Exam  BP (!) 137/91 (BP Location: Left arm, Patient Position: Chair, Cuff Size: Adult Regular)   Pulse 83   Temp 98.3  F (36.8  C) (Tympanic)   Resp 16   Ht 1.651 m (5' 5\")   Wt 77.3 kg (170 lb 6.4 oz)   SpO2 99%   BMI 28.36 kg/m    Body mass index is 28.36 kg/m .  GENERAL :  In no apparent distress. He is accompanied by his significant other  SKIN: Normal color, normal temperature, texture.  No hirsutism, alopecia or purple striae.     EYES: PERRL, EOMI, No scleral icterus,  No proptosis, conjunctival redness, stare, retraction  NECK: No visible masses. No palpable adenopathy, or masses. No carotid bruits.   THYROID:  Normal, nontender, smooth / firm texture,  no nodules, no Bruit   RESP: Lungs clear to auscultation bilaterally  CARDIAC: Regular rate and rhythm, normal S1 S2, without murmurs, rubs or gallops    NEURO: awake, alert, responds appropriately to questions.  Cranial nerves intact.   Moves all extremities; Gait normal.  No tremor of the outstretched hand.   EXTREMITIES: No clubbing, cyanosis or edema.    DATA REVIEW  Date Morning Noon Evening   08/15  163    08/14 147 166 134   08/13 149 136 105   08/12 146 244 133   08/11 165 123 187   08/10 188 154 147   08/09 97 211/159 165     Current Ave  mg/dl        Again, thank you for allowing me to participate in the care of your patient.        Sincerely,        Yuki Hendrix PA-C  "

## 2023-08-15 NOTE — TELEPHONE ENCOUNTER
We need a max daily dose for the Novolog please.      Thank you,  Paloma Hanna, [facility]  [phone]

## 2023-08-16 ENCOUNTER — VIRTUAL VISIT (OUTPATIENT)
Dept: EDUCATION SERVICES | Facility: CLINIC | Age: 33
End: 2023-08-16

## 2023-08-16 DIAGNOSIS — E11.65 POORLY CONTROLLED DIABETES MELLITUS (H): Primary | ICD-10-CM

## 2023-08-16 PROCEDURE — G0108 DIAB MANAGE TRN  PER INDIV: HCPCS | Mod: VID | Performed by: DIETITIAN, REGISTERED

## 2023-08-16 NOTE — LETTER
8/16/2023         RE: Ubaldo Zamora  [id number] [address] [facility] [address]        Dear Colleague,    Thank you for referring your patient, Ubaldo Zamora, to the [facility]. Please see a copy of my visit note below.    Diabetes Self-Management Education & Support    Presents for: Follow-up    Type of service:  Video Visit    If the video visit is dropped, the video visit invitation should be resent by: Text to cell phone: [phone]    Originating Location (pt. Location): Home  Distant Location (provider location): [facility]  Mode of Communication:  Video Conference via [employer]    Video Start Time:  8:00am  Video End Time (time video stopped): 8:37am    How would patient like to obtain AVS? MyChart      ASSESSMENT:  -type 2 diabetes for ~15 years  -recent a1c of 14.6  -currently being managed with Lantus (27 units) and Novolog (average of 20 units/day)  -not on aspirin nor statin therapy  -diabetes complications:  DKA  -history: pancreatitis  -last seen by diabetes education 7/26/23  -home BG monitoring reveals:  27% in target, ranging , (per download yesterday average )    Primary concern: No significant concerns.  Feeling much better.  Having a few lows, early morning or late at night or at work.  Treating with a pudding cup.  Was given sample Libre2 and plans to start it.  Just switched ICR for Novolog yesterday from 1 unit per 10 grams of carbohydrate to 1 unit per 15 grams.        TDD ~47 units/day.        Discussed:  basic physiology, natural progression of type 2, target BG and a1c, mechanism of action of medication, basic carbohydrate counting, meal plan, hypoglycemia symptoms and treatment, use of cgms, care team, resources.  Patient expressed understanding.      Patient's most recent   Lab Results   Component Value Date    A1C 14.6 07/15/2023    A1C 10.1 05/20/2016    HEMOGLOBINA1 6.7 07/15/2010     is not meeting goal of  <7.0    Diabetes knowledge and skills assessment:   Patient is knowledgeable in diabetes management concepts related to: Monitoring and Taking Medication    Continue education with the following diabetes management concepts: Healthy Eating, Being Active, Monitoring, Taking Medication, Problem Solving, Reducing Risks, and Healthy Coping    Based on learning assessment above, most appropriate setting for further diabetes education would be: Individual setting.      PLAN     Continue the great work of carbohydrate counting! Aiming for healthy balanced meals 3 times/day.    Aim to be as active as possible.  Ideally 20-30 minutes 5 days/week.  Continue to take Lantus 27 units as prescribed.    Continue Novolog 1 unit per 15 grams of carbohydrate.    Test your BG 3-4 time/day alternating testing times: before and/or 2 hrs after the start of the meal, until you start the Libre2.  Download [employer] bev.  Accept invitation to share data through [employer].  Start entering events into the Laura (how much insulin injecting and carbohydrate intake).    6.  Follow up with Rell Byrd as directed.  7.  Follow up with endocrinology on 10/16/23 as scheduled.  8.  Follow up with Diabetes Education 10/25/23 at 8am (video visit).      See Care Plan for co-developed, patient-state behavior change goals.  Patient instructions able to be accessed via [employer].    Education Materials Provided:  No new materials provided today      SUBJECTIVE/OBJECTIVE:  Presents for: Follow-up  Accompanied by: Self, Significant other  Diabetes education in the past 24mo: Yes  Focus of Visit: Taking Medication, Monitoring  Diabetes type: Other (states was diagnosed T2 when 18 years old)  Date of diagnosis: 15 years ago (?2008)  Disease course: Getting harder to manage (just learning more about how to manage)  Difficulty affording diabetes medication?: Yes  Difficulty affording diabetes testing supplies?: Yes  Other concerns:: None  Cultural  "Influences/Ethnic Background:  Not  or       Diabetes Symptoms & Complications:  Fatigue: Yes  Neuropathy: Yes  Polydipsia: No  Polyphagia: No  Polyuria: No  Visual change: Yes  Slow healing wounds: Yes  Weight trend: Stable       Patient Problem List and Family Medical History reviewed for relevant medical history, current medical status, and diabetes risk factors.    Vitals:  There were no vitals taken for this visit.  Estimated body mass index is 28.36 kg/m  as calculated from the following:    Height as of 8/15/23: 1.651 m (5' 5\").    Weight as of 8/15/23: 77.3 kg (170 lb 6.4 oz).   Last 3 BP:   BP Readings from Last 3 Encounters:   08/15/23 (!) 137/91   07/20/23 (!) 147/90   10/03/22 (!) 136/95       History   Smoking Status     Every Day     Packs/day: 1.00     Types: Cigarettes   Smokeless Tobacco     Never       Labs:  Lab Results   Component Value Date    A1C 14.6 07/15/2023    A1C 10.1 05/20/2016    HEMOGLOBINA1 6.7 07/15/2010     Lab Results   Component Value Date     07/20/2023     06/01/2022     05/20/2016     Lab Results   Component Value Date    LDL  06/01/2022      Comment:      Cannot estimate LDL when triglyceride exceeds 400 mg/dL    LDL 86 06/01/2022    LDL  05/20/2016     Cannot estimate LDL when triglyceride exceeds 400 mg/dL     05/20/2016     HDL Cholesterol   Date Value Ref Range Status   05/20/2016 22 (L) >39 mg/dL Final     Direct Measure HDL   Date Value Ref Range Status   06/01/2022 25 (L) >=40 mg/dL Final   ]  GFR Estimate   Date Value Ref Range Status   07/19/2023 >90 >60 mL/min/1.73m2 Final   05/20/2016 >90  Non  GFR Calc   >60 mL/min/1.7m2 Final     GFR Estimate If Black   Date Value Ref Range Status   05/20/2016 >90   GFR Calc   >60 mL/min/1.7m2 Final     Lab Results   Component Value Date    CR 1.10 07/19/2023    CR 0.66 05/20/2016     No results found for: MICROALBUMIN    Healthy Eating:  Healthy Eating " Assessed Today: Yes  Cultural/Moravian diet restrictions?: No  Meal planning/habits: Carb counting  How many times a week on average do you eat food made away from home (restaurant/take-out)?: 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))  Meals include: Breakfast, Lunch, Dinner  Breakfast: 9-10am:  bowl of cereal or cup of yogurt, diet soda or water (aims for 30-40 grams of carb)  Lunch: 2pm: bologna sandwich or hungryman meal (chicken & potatoes) (aims for 60-70 grams of carb), diet pop  Dinner: 7-8pm: (aims for 60-80 grams of carb), diet pop  Snacks: HS: turkey meat stick, water  Beverages: Diet soda, Water    Being Active:  Being Active Assessed Today: Yes  Exercise:: Currently not exercising (works at [employer] so on feet all shift)  Barrier to exercise: None    Monitoring:  Monitoring Assessed Today: Yes  Did patient bring glucose meter to appointment? : Yes  Blood Glucose Meter: Accu-chek  Times checking blood sugar at home (number): 3 (3-4)  Times checking blood sugar at home (per): Day    Date Breakfast  Lunch  Dinner  8pm  Bedtime    Before After Before After Before After    8/15   163 136   71   8/14 147  166  134     8/13 149  136  105     8/12 146 2 regular pops 244  133     8/11 165  123  187     8/10 188  154  147     8/9 97  211  159 165    27% in target, ranging       Taking Medications:  Diabetes Medication(s)       Diabetic Other       Glucagon (GVOKE HYPOPEN) 1 MG/0.2ML pen    Inject the contents of 1 device under the skin into lower abdomen, outer thigh, or outer upper arm as needed for hypoglycemia. If no response after 15 minutes, additional 1 mg dose from a new device may be injected while waiting for emergency assistance.      Insulin       insulin aspart (NOVOLOG FLEXPEN) 100 UNIT/ML pen    1 units per 15 gram carb unit before breakfast, lunch and dinner     insulin glargine (LANTUS PEN) 100 UNIT/ML pen    Inject 27 Units Subcutaneous every morning (before breakfast)            Taking  Medication Assessed Today: Yes  Current Treatments: Insulin Injections  Dose schedule: Pre-breakfast, Pre-lunch, Pre-dinner  Given by: Patient, Significant other  Injection/Infusion sites: Arms, Abdomen (states rotates sites and changes needle everytime)    Problem Solving:                 Reducing Risks:  Reducing Risks Assessed Today: Yes  Diabetes Risks: Family History  CAD Risks: Diabetes Mellitus, Male sex, Family history  Has dilated eye exam at least once a year?: Yes  Sees dentist every 6 months?: No  Feet checked by healthcare provider in the last year?: Yes    Healthy Coping:  Healthy Coping Assessed Today: Yes  Emotional response to diabetes: Acceptance, Ready to learn  Informal Support system:: Significant other  Stage of change: ACTION (Actively working towards change)  Patient Activation Measure Survey Score:      5/20/2016     4:32 PM   JOYCE Score (Last Two)   JOYCE Raw Score 41   Activation Score 63.2   JOYCE Level 3         Care Plan and Education Provided:  Care Plan: Diabetes   Updates made by Rosa Wilson since 8/16/2023 12:00 AM        Problem: Diabetes Self-Management Education Needed to Optimize Self-Care Behaviors         Goal: Monitoring - monitor glucose and ketones as directed    This Visit's Progress: 90%   Recent Progress: 80%        Goal: Taking Medication - patient is consistently taking medications as directed    This Visit's Progress: 100%   Recent Progress: 100%        Goal: Problem Solving - know how to prevent and manage short-term diabetes complications    This Visit's Progress: 60%   Recent Progress: 30%          Rosa Wilson RD, [facility], 8:55 AM, 8/16/2023      Time Spent: 37 minutes  Encounter Type: Individual    Any diabetes medication dose changes were made via the CDE Protocol per the patient's referring provider. A copy of this encounter was shared with the provider.

## 2023-08-16 NOTE — PROGRESS NOTES
Diabetes Self-Management Education & Support    Presents for: Follow-up    Type of service:  Video Visit    If the video visit is dropped, the video visit invitation should be resent by: Text to cell phone: [phone]    Originating Location (pt. Location): Home  Distant Location (provider location): [facility]  Mode of Communication:  Video Conference via [employer]    Video Start Time:  8:00am  Video End Time (time video stopped): 8:37am    How would patient like to obtain AVS? MyChart      ASSESSMENT:  -type 2 diabetes for ~15 years  -recent a1c of 14.6  -currently being managed with Lantus (27 units) and Novolog (average of 20 units/day)  -not on aspirin nor statin therapy  -diabetes complications:  DKA  -history: pancreatitis  -last seen by diabetes education 7/26/23  -home BG monitoring reveals:  27% in target, ranging , (per download yesterday average )    Primary concern: No significant concerns.  Feeling much better.  Having a few lows, early morning or late at night or at work.  Treating with a pudding cup.  Was given sample Libre2 and plans to start it.  Just switched ICR for Novolog yesterday from 1 unit per 10 grams of carbohydrate to 1 unit per 15 grams.        TDD ~47 units/day.        Discussed:  basic physiology, natural progression of type 2, target BG and a1c, mechanism of action of medication, basic carbohydrate counting, meal plan, hypoglycemia symptoms and treatment, use of cgms, care team, resources.  Patient expressed understanding.      Patient's most recent   Lab Results   Component Value Date    A1C 14.6 07/15/2023    A1C 10.1 05/20/2016    HEMOGLOBINA1 6.7 07/15/2010     is not meeting goal of <7.0    Diabetes knowledge and skills assessment:   Patient is knowledgeable in diabetes management concepts related to: Monitoring and Taking Medication    Continue education with the following diabetes management concepts: Healthy Eating, Being Active,  Monitoring, Taking Medication, Problem Solving, Reducing Risks, and Healthy Coping    Based on learning assessment above, most appropriate setting for further diabetes education would be: Individual setting.      PLAN     Continue the great work of carbohydrate counting! Aiming for healthy balanced meals 3 times/day.    Aim to be as active as possible.  Ideally 20-30 minutes 5 days/week.  Continue to take Lantus 27 units as prescribed.    Continue Novolog 1 unit per 15 grams of carbohydrate.    Test your BG 3-4 time/day alternating testing times: before and/or 2 hrs after the start of the meal, until you start the Libre2.  Download Libre2 bev.  Accept invitation to share data through [employer].  Start entering events into the Laura (how much insulin injecting and carbohydrate intake).    6.  Follow up with Rell Byrd as directed.  7.  Follow up with endocrinology on 10/16/23 as scheduled.  8.  Follow up with Diabetes Education 10/25/23 at 8am (video visit).      See Care Plan for co-developed, patient-state behavior change goals.  Patient instructions able to be accessed via [employer].    Education Materials Provided:  No new materials provided today      SUBJECTIVE/OBJECTIVE:  Presents for: Follow-up  Accompanied by: Self, Significant other  Diabetes education in the past 24mo: Yes  Focus of Visit: Taking Medication, Monitoring  Diabetes type: Other (states was diagnosed T2 when 18 years old)  Date of diagnosis: 15 years ago (?2008)  Disease course: Getting harder to manage (just learning more about how to manage)  Difficulty affording diabetes medication?: Yes  Difficulty affording diabetes testing supplies?: Yes  Other concerns:: None  Cultural Influences/Ethnic Background:  Not  or       Diabetes Symptoms & Complications:  Fatigue: Yes  Neuropathy: Yes  Polydipsia: No  Polyphagia: No  Polyuria: No  Visual change: Yes  Slow healing wounds: Yes  Weight trend: Stable       Patient Problem List and  "Family Medical History reviewed for relevant medical history, current medical status, and diabetes risk factors.    Vitals:  There were no vitals taken for this visit.  Estimated body mass index is 28.36 kg/m  as calculated from the following:    Height as of 8/15/23: 1.651 m (5' 5\").    Weight as of 8/15/23: 77.3 kg (170 lb 6.4 oz).   Last 3 BP:   BP Readings from Last 3 Encounters:   08/15/23 (!) 137/91   07/20/23 (!) 147/90   10/03/22 (!) 136/95       History   Smoking Status    Every Day    Packs/day: 1.00    Types: Cigarettes   Smokeless Tobacco    Never       Labs:  Lab Results   Component Value Date    A1C 14.6 07/15/2023    A1C 10.1 05/20/2016    HEMOGLOBINA1 6.7 07/15/2010     Lab Results   Component Value Date     07/20/2023     06/01/2022     05/20/2016     Lab Results   Component Value Date    LDL  06/01/2022      Comment:      Cannot estimate LDL when triglyceride exceeds 400 mg/dL    LDL 86 06/01/2022    LDL  05/20/2016     Cannot estimate LDL when triglyceride exceeds 400 mg/dL     05/20/2016     HDL Cholesterol   Date Value Ref Range Status   05/20/2016 22 (L) >39 mg/dL Final     Direct Measure HDL   Date Value Ref Range Status   06/01/2022 25 (L) >=40 mg/dL Final   ]  GFR Estimate   Date Value Ref Range Status   07/19/2023 >90 >60 mL/min/1.73m2 Final   05/20/2016 >90  Non  GFR Calc   >60 mL/min/1.7m2 Final     GFR Estimate If Black   Date Value Ref Range Status   05/20/2016 >90   GFR Calc   >60 mL/min/1.7m2 Final     Lab Results   Component Value Date    CR 1.10 07/19/2023    CR 0.66 05/20/2016     No results found for: MICROALBUMIN    Healthy Eating:  Healthy Eating Assessed Today: Yes  Cultural/Denominational diet restrictions?: No  Meal planning/habits: Carb counting  How many times a week on average do you eat food made away from home (restaurant/take-out)?: 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))  Meals include: Breakfast, Lunch, " Dinner  Breakfast: 9-10am:  bowl of cereal or cup of yogurt, diet soda or water (aims for 30-40 grams of carb)  Lunch: 2pm: bologna sandwich or hungryman meal (chicken & potatoes) (aims for 60-70 grams of carb), diet pop  Dinner: 7-8pm: (aims for 60-80 grams of carb), diet pop  Snacks: HS: turkey meat stick, water  Beverages: Diet soda, Water    Being Active:  Being Active Assessed Today: Yes  Exercise:: Currently not exercising (works at [employer] so on feet all shift)  Barrier to exercise: None    Monitoring:  Monitoring Assessed Today: Yes  Did patient bring glucose meter to appointment? : Yes  Blood Glucose Meter: Accu-chek  Times checking blood sugar at home (number): 3 (3-4)  Times checking blood sugar at home (per): Day    Date Breakfast  Lunch  Dinner  8pm  Bedtime    Before After Before After Before After    8/15   163 136   71   8/14 147  166  134     8/13 149  136  105     8/12 146 2 regular pops 244  133     8/11 165  123  187     8/10 188  154  147     8/9 97  211  159 165    27% in target, ranging       Taking Medications:  Diabetes Medication(s)       Diabetic Other       Glucagon (GVOKE HYPOPEN) 1 MG/0.2ML pen    Inject the contents of 1 device under the skin into lower abdomen, outer thigh, or outer upper arm as needed for hypoglycemia. If no response after 15 minutes, additional 1 mg dose from a new device may be injected while waiting for emergency assistance.      Insulin       insulin aspart (NOVOLOG FLEXPEN) 100 UNIT/ML pen    1 units per 15 gram carb unit before breakfast, lunch and dinner     insulin glargine (LANTUS PEN) 100 UNIT/ML pen    Inject 27 Units Subcutaneous every morning (before breakfast)            Taking Medication Assessed Today: Yes  Current Treatments: Insulin Injections  Dose schedule: Pre-breakfast, Pre-lunch, Pre-dinner  Given by: Patient, Significant other  Injection/Infusion sites: Arms, Abdomen (states rotates sites and changes needle everytime)    Problem  Solving:                 Reducing Risks:  Reducing Risks Assessed Today: Yes  Diabetes Risks: Family History  CAD Risks: Diabetes Mellitus, Male sex, Family history  Has dilated eye exam at least once a year?: Yes  Sees dentist every 6 months?: No  Feet checked by healthcare provider in the last year?: Yes    Healthy Coping:  Healthy Coping Assessed Today: Yes  Emotional response to diabetes: Acceptance, Ready to learn  Informal Support system:: Significant other  Stage of change: ACTION (Actively working towards change)  Patient Activation Measure Survey Score:      5/20/2016     4:32 PM   JOYCE Score (Last Two)   JOYCE Raw Score 41   Activation Score 63.2   JOYCE Level 3         Care Plan and Education Provided:  Care Plan: Diabetes   Updates made by Rosa Wilson since 8/16/2023 12:00 AM        Problem: Diabetes Self-Management Education Needed to Optimize Self-Care Behaviors         Goal: Monitoring - monitor glucose and ketones as directed    This Visit's Progress: 90%   Recent Progress: 80%        Goal: Taking Medication - patient is consistently taking medications as directed    This Visit's Progress: 100%   Recent Progress: 100%        Goal: Problem Solving - know how to prevent and manage short-term diabetes complications    This Visit's Progress: 60%   Recent Progress: 30%          Rosa Wilson RD, [facility], 8:55 AM, 8/16/2023      Time Spent: 37 minutes  Encounter Type: Individual    Any diabetes medication dose changes were made via the CDE Protocol per the patient's referring provider. A copy of this encounter was shared with the provider.

## 2023-08-20 ENCOUNTER — HEALTH MAINTENANCE LETTER (OUTPATIENT)
Age: 33
End: 2023-08-20

## 2023-08-25 ENCOUNTER — TELEPHONE (OUTPATIENT)
Dept: ENDOCRINOLOGY | Facility: CLINIC | Age: 33
End: 2023-08-25

## 2023-08-25 DIAGNOSIS — E11.65 TYPE 2 DIABETES MELLITUS WITH HYPERGLYCEMIA, WITH LONG-TERM CURRENT USE OF INSULIN (H): Primary | ICD-10-CM

## 2023-08-25 DIAGNOSIS — Z79.4 TYPE 2 DIABETES MELLITUS WITH HYPERGLYCEMIA, WITH LONG-TERM CURRENT USE OF INSULIN (H): Primary | ICD-10-CM

## 2023-08-25 RX ORDER — INSULIN LISPRO 100 [IU]/ML
INJECTION, SOLUTION INTRAVENOUS; SUBCUTANEOUS
Qty: 15 ML | Refills: 1 | Status: SHIPPED | OUTPATIENT
Start: 2023-08-25 | End: 2024-02-19

## 2023-08-25 NOTE — TELEPHONE ENCOUNTER
Epic shows Novolog order from recent discharge but pharmacy got verbal order from hospitalist to change to humalog (has a copay card available to bring to $35/month), while novolog is over $500 for box with discount card (Ubaldo is uninsured).  Please send new order for Humalog, thanks!    Corinne Julian, PharmD    [facility]  Phone:  [phone]  Fax:  [phone]

## 2023-08-28 ENCOUNTER — E-VISIT (OUTPATIENT)
Dept: URGENT CARE | Facility: CLINIC | Age: 33
End: 2023-08-28

## 2023-08-28 DIAGNOSIS — B35.6 JOCK ITCH: Primary | ICD-10-CM

## 2023-08-28 PROCEDURE — 99207 PR NON-BILLABLE SERV PER CHARTING: CPT | Performed by: FAMILY MEDICINE

## 2023-08-28 RX ORDER — CLOTRIMAZOLE 1 %
CREAM (GRAM) TOPICAL 2 TIMES DAILY
Qty: 60 G | Refills: 1 | Status: SHIPPED | OUTPATIENT
Start: 2023-08-28

## 2023-08-28 NOTE — PATIENT INSTRUCTIONS
"Dear Ubaldo Zamora    After reviewing your responses, I've been able to diagnose you with \"tinea\" which is a common skin condition caused by a fungal infection. There are many common names for this depending on where it is located and it's appearance (jock itch, athlete's foot, ringworm, etc).  Based on your responses, I have prescribed Lotrimin to treat this. Please follow the instructions on the medication. If you experience irritation of your skin, new rash, or any other new symptoms, you should stop using this medication and contact your primary care provider.  If this treatment does not work for you in 2 weeks or after completing treatment, please plan to follow-up with your primary care provider to evaluate in-person.  Self-care:  Wash all items that come into contact with infected skin: Wash all towels, clothes, and bedding in hot water. Use laundry soap. Clean shower stalls, mats, and floors with a germ-killing or fungus-killing .   Do not share personal items: Do not share towels, brushes, dunlap, or hair accessories.    Keep your skin, hair, and nails clean and dry: Bathe every day, and dry your skin before you put medicine on the infected area. Wash your hands often. Do not scratch your sores. This may cause the infection to spread.   Avoid infected pets: A patch of missing fur is a sign of infection in a pet. Take your infected pet to a  for treatment.  Contact your primary healthcare provider if:  You have a fever.    Your infection continues to spread after 7 days of treatment.   Your rash is not gone in 2 weeks.   The area around your rash becomes red, warm, tender, swollen, or smells bad.   You have questions or concerns about your condition or care.    Thanks for choosing?us?as your health care partner,    ADAM CASTILLO CNP  "

## 2023-10-16 ENCOUNTER — OFFICE VISIT (OUTPATIENT)
Dept: ENDOCRINOLOGY | Facility: CLINIC | Age: 33
End: 2023-10-16

## 2023-10-16 VITALS
SYSTOLIC BLOOD PRESSURE: 136 MMHG | DIASTOLIC BLOOD PRESSURE: 77 MMHG | WEIGHT: 170.2 LBS | RESPIRATION RATE: 16 BRPM | HEIGHT: 65 IN | HEART RATE: 79 BPM | OXYGEN SATURATION: 99 % | BODY MASS INDEX: 28.36 KG/M2 | TEMPERATURE: 96.6 F

## 2023-10-16 DIAGNOSIS — E11.65 TYPE 2 DIABETES MELLITUS WITH HYPERGLYCEMIA, WITH LONG-TERM CURRENT USE OF INSULIN (H): Primary | ICD-10-CM

## 2023-10-16 DIAGNOSIS — Z79.4 TYPE 2 DIABETES MELLITUS WITH HYPERGLYCEMIA, WITH LONG-TERM CURRENT USE OF INSULIN (H): Primary | ICD-10-CM

## 2023-10-16 LAB — HBA1C MFR BLD: 6.3 % (ref 0–5.6)

## 2023-10-16 PROCEDURE — 83036 HEMOGLOBIN GLYCOSYLATED A1C: CPT | Performed by: PHYSICIAN ASSISTANT

## 2023-10-16 PROCEDURE — 99213 OFFICE O/P EST LOW 20 MIN: CPT | Performed by: PHYSICIAN ASSISTANT

## 2023-10-16 PROCEDURE — 36415 COLL VENOUS BLD VENIPUNCTURE: CPT | Performed by: PHYSICIAN ASSISTANT

## 2023-10-16 NOTE — LETTER
10/16/2023         RE: Ubaldo Zamora  [id number] [facility] [id number]        Dear Colleague,    Thank you for referring your patient, Ubaldo Zamora, to the [facility]. Please see a copy of my visit note below.    Assessment/Plan :   Type 2 DM. Connor is doing great. His blood sugars have improved significantly. He is monitoring his numbers closely with the FreeStyle Laura and he has days when his blood sugars are within target range 100% of the time. He is due for repeat laboratory testing and I will place a lab order for an A1C. I would like to check a c-peptide but we will wait until he has insurance coverage. He is also interested in switching to insulin pump therapy, but again, we will wait until his new insurance has started. I will contact him with the results and we will follow-up in 1-3 mos.      I have independently reviewed and interpreted labs, imaging as indicated.      Chief complaint:  Ubaldo is a 33 year old male who returns for follow-up of Type 1 DM.    I have reviewed Care Everywhere including [facility], [facility], [facility], [facility], [facility] , [facility], [place] lab reports, imaging reports and provider notes as indicated.      HISTORY OF PRESENT ILLNESS  Connor has been working hard to get his blood sugars under better control. He has been taking Lantus 27 unit(s) every morning and he has been focused on limiting his daily carb intake and being active. As a result, he has only needed to use the insulin lispro on a few occasions. He is monitoring his blood sugars with the FreeStyle Laura 2.    He has not had any problems with severe hyperglycemia and/or hypoglycemia. He did have a couple low blood sugars over night and he was able to treat with a small amount of juice. He has not had any problems with blurred vision or worsening numbness/tingling in his feet. He is overdue for an eye exam but he is waiting until he gets his new  insurance before scheduling.     Connor was diagnosed with diabetes in 2008 at age of 18. He was originally started on metformin which seemed to work for some time. Eventually his blood sugars started to increase and insulin was added. He has not been the best at consistently administering his insulin and his diabetes has not been well controlled for the last few years. Recently, he was without insurance. He was trying to manage his blood sugars through diet alone when he developed perineal cellulitis. He was seen at the ER and restarted on Lantus and pre-meal insulin, along with an antibiotic. Since that time, he has been working hard to get his blood sugars under better control.    Endocrine relevant labs are as follows:   Latest Reference Range & Units 10/16/23 15:24   Hemoglobin A1C 0.0 - 5.6 % 6.3 (H)   (H): Data is abnormally high    REVIEW OF SYSTEMS    Endocrine: positive for diabetes  Skin: negative for, acne on face, facial redness  Eyes: negative for, visual blurring, redness, tearing, itching  Ears/Nose/Throat: negative for, persistent sore throat, hoarseness  Respiratory: No shortness of breath, dyspnea on exertion, cough, or hemoptysis  Cardiovascular: negative for, irregular heart beat, chest pain, dyspnea on exertion, lower extremity edema, and exercise intolerance  Gastrointestinal: negative for, nausea, vomiting, constipation, and diarrhea  Genitourinary: negative for, nocturia, dysuria, frequency, and urgency  Musculoskeletal: negative for, muscular weakness, nocturnal cramping, and foot pain  Neurologic: negative for, local weakness, and numbness or tingling of feet  Psychiatric: negative  Hematologic/Lymphatic/Immunologic: negative    Past Medical History  Past Medical History:   Diagnosis Date     Diabetes (H)     type 2 diabetes     Diabetes mellitus type 2 with complications (H)     history of DKA, recurring infections       Medications  Current Outpatient Medications   Medication Sig Dispense  Refill     alcohol swab prep pads Use to swab area of injection/kelechi as directed. 100 each 3     blood glucose (NO BRAND SPECIFIED) test strip Use to test blood sugar daily or as directed. To accompany: Blood Glucose Monitor Brands: per insurance. 300 strip 11     blood glucose calibration (NO BRAND SPECIFIED) solution To accompany: Blood Glucose Monitor Brands: per insurance. 1 each 3     blood glucose monitoring (NO BRAND SPECIFIED) meter device kit Use to test blood sugar with meals, at bedtime, and PRN 1 kit 0     blood glucose monitoring (NO BRAND SPECIFIED) meter device kit Use to test blood sugar as directed. 1 kit 0     clotrimazole (LOTRIMIN) 1 % external cream Apply topically 2 times daily 60 g 1     Continuous Blood Gluc  (FREESTYLE GEOVANY 2 READER) EDDIE Use to read blood sugars as per 's instructions. 1 each 0     Continuous Blood Gluc Sensor (FREESTYLE GEOVANY 2 SENSOR) MISC Change every 14 days. 2 each 5     Glucagon (GVOKE HYPOPEN) 1 MG/0.2ML pen Inject the contents of 1 device under the skin into lower abdomen, outer thigh, or outer upper arm as needed for hypoglycemia. If no response after 15 minutes, additional 1 mg dose from a new device may be injected while waiting for emergency assistance. 0.4 mL 1     insulin aspart (NOVOLOG FLEXPEN) 100 UNIT/ML pen 1 units per 15 gram carb unit before breakfast, lunch and dinner 15 mL 1     insulin glargine (LANTUS PEN) 100 UNIT/ML pen Inject 27 Units Subcutaneous every morning (before breakfast) 15 mL 1     insulin lispro (HUMALOG KWIKPEN) 100 UNIT/ML (1 unit dial) KWIKPEN 1 units per 15 gram carb unit before breakfast, lunch and dinner up to 20 units daily 15 mL 1     lisinopril (ZESTRIL) 20 MG tablet Take 1 tablet (20 mg) by mouth daily 90 tablet 1     Sharps Container MISC 1 Units See Admin Instructions .  Use sharps container as instructed to dispose of needles and lancets. 1 each 11     thin (NO BRAND SPECIFIED) lancets Use with  "lanceting device. To accompany: Blood Glucose Monitor Brands: per insurance. 100 each 6       Allergies  No Known Allergies      Family History  family history includes Alcoholism in his father; Asthma in his father; Colon Cancer in his maternal grandmother; Coronary Artery Disease in his maternal grandfather, mother, and paternal grandmother; Depression in his father, maternal grandfather, maternal grandmother, and mother; Diabetes in his maternal grandfather, maternal grandmother, mother, paternal grandfather, and paternal grandmother; Heart Disease in his mother; Hyperlipidemia in his father, maternal grandfather, maternal grandmother, and paternal grandmother; Hypertension in his father and mother; Lung Cancer in his father; Mental Illness in his father; Obesity in his mother; Stomach Problem in his mother.    Social History  Social History     Tobacco Use     Smoking status: Every Day     Packs/day: 1     Types: Cigarettes     Smokeless tobacco: Never   Vaping Use     Vaping Use: Former   Substance Use Topics     Alcohol use: No     Alcohol/week: 0.0 standard drinks of alcohol     Drug use: Not Currently     Types: Marijuana       Physical Exam  /77 (BP Location: Left arm, Patient Position: Chair, Cuff Size: Adult Regular)   Pulse 79   Temp (!) 96.6  F (35.9  C) (Tympanic)   Resp 16   Ht 1.651 m (5' 5\")   Wt 77.2 kg (170 lb 3.2 oz)   SpO2 99%   BMI 28.32 kg/m    Body mass index is 28.32 kg/m .  GENERAL :  In no apparent distress  SKIN: Normal color, normal temperature, texture.  No hirsutism, alopecia or purple striae.     EYES: PERRL, EOMI, No scleral icterus,  No proptosis, conjunctival redness, stare, retraction  RESP: Lungs clear to auscultation bilaterally  CARDIAC: Regular rate and rhythm, normal S1 S2, without murmurs, rubs or gallops    NEURO: awake, alert, responds appropriately to questions.  Cranial nerves intact.   Moves all extremities; Gait normal.  No tremor of the outstretched hand. "   EXTREMITIES: No clubbing, cyanosis or edema.    DATA REVIEW  FreeStyle LibreView  Time in target range 75%  High 24% and Very High 1%  Current Ave  mg/dl        Again, thank you for allowing me to participate in the care of your patient.        Sincerely,        Yuki Hendrix PA-C

## 2023-10-17 NOTE — PROGRESS NOTES
Assessment/Plan :   Type 2 DM. Connor is doing great. His blood sugars have improved significantly. He is monitoring his numbers closely with the FreeStyle Laura and he has days when his blood sugars are within target range 100% of the time. He is due for repeat laboratory testing and I will place a lab order for an A1C. I would like to check a c-peptide but we will wait until he has insurance coverage. He is also interested in switching to insulin pump therapy, but again, we will wait until his new insurance has started. I will contact him with the results and we will follow-up in 1-3 mos.      I have independently reviewed and interpreted labs, imaging as indicated.      Chief complaint:  Ubaldo is a 33 year old male who returns for follow-up of Type 1 DM.    I have reviewed Care Everywhere including [facility], [facility], [facility], [facility], [facility], [facility] , [facility], [place] lab reports, imaging reports and provider notes as indicated.      HISTORY OF PRESENT ILLNESS  Connor has been working hard to get his blood sugars under better control. He has been taking Lantus 27 unit(s) every morning and he has been focused on limiting his daily carb intake and being active. As a result, he has only needed to use the insulin lispro on a few occasions. He is monitoring his blood sugars with the FreeStyle Laura 2.    He has not had any problems with severe hyperglycemia and/or hypoglycemia. He did have a couple low blood sugars over night and he was able to treat with a small amount of juice. He has not had any problems with blurred vision or worsening numbness/tingling in his feet. He is overdue for an eye exam but he is waiting until he gets his new insurance before scheduling.     Connor was diagnosed with diabetes in 2008 at age of 18. He was originally started on metformin which seemed to work for some time. Eventually his blood sugars started to increase and insulin was added. He has not been the  best at consistently administering his insulin and his diabetes has not been well controlled for the last few years. Recently, he was without insurance. He was trying to manage his blood sugars through diet alone when he developed perineal cellulitis. He was seen at the ER and restarted on Lantus and pre-meal insulin, along with an antibiotic. Since that time, he has been working hard to get his blood sugars under better control.    Endocrine relevant labs are as follows:   Latest Reference Range & Units 10/16/23 15:24   Hemoglobin A1C 0.0 - 5.6 % 6.3 (H)   (H): Data is abnormally high    REVIEW OF SYSTEMS    Endocrine: positive for diabetes  Skin: negative for, acne on face, facial redness  Eyes: negative for, visual blurring, redness, tearing, itching  Ears/Nose/Throat: negative for, persistent sore throat, hoarseness  Respiratory: No shortness of breath, dyspnea on exertion, cough, or hemoptysis  Cardiovascular: negative for, irregular heart beat, chest pain, dyspnea on exertion, lower extremity edema, and exercise intolerance  Gastrointestinal: negative for, nausea, vomiting, constipation, and diarrhea  Genitourinary: negative for, nocturia, dysuria, frequency, and urgency  Musculoskeletal: negative for, muscular weakness, nocturnal cramping, and foot pain  Neurologic: negative for, local weakness, and numbness or tingling of feet  Psychiatric: negative  Hematologic/Lymphatic/Immunologic: negative    Past Medical History  Past Medical History:   Diagnosis Date    Diabetes (H)     type 2 diabetes    Diabetes mellitus type 2 with complications (H)     history of DKA, recurring infections       Medications  Current Outpatient Medications   Medication Sig Dispense Refill    alcohol swab prep pads Use to swab area of injection/kelechi as directed. 100 each 3    blood glucose (NO BRAND SPECIFIED) test strip Use to test blood sugar daily or as directed. To accompany: Blood Glucose Monitor Brands: per insurance. 300 strip  11    blood glucose calibration (NO BRAND SPECIFIED) solution To accompany: Blood Glucose Monitor Brands: per insurance. 1 each 3    blood glucose monitoring (NO BRAND SPECIFIED) meter device kit Use to test blood sugar with meals, at bedtime, and PRN 1 kit 0    blood glucose monitoring (NO BRAND SPECIFIED) meter device kit Use to test blood sugar as directed. 1 kit 0    clotrimazole (LOTRIMIN) 1 % external cream Apply topically 2 times daily 60 g 1    Continuous Blood Gluc  (FREESTYLE GEOVANY 2 READER) EDDIE Use to read blood sugars as per 's instructions. 1 each 0    Continuous Blood Gluc Sensor (FREESTYLE GEOVANY 2 SENSOR) MISC Change every 14 days. 2 each 5    Glucagon (GVOKE HYPOPEN) 1 MG/0.2ML pen Inject the contents of 1 device under the skin into lower abdomen, outer thigh, or outer upper arm as needed for hypoglycemia. If no response after 15 minutes, additional 1 mg dose from a new device may be injected while waiting for emergency assistance. 0.4 mL 1    insulin aspart (NOVOLOG FLEXPEN) 100 UNIT/ML pen 1 units per 15 gram carb unit before breakfast, lunch and dinner 15 mL 1    insulin glargine (LANTUS PEN) 100 UNIT/ML pen Inject 27 Units Subcutaneous every morning (before breakfast) 15 mL 1    insulin lispro (HUMALOG KWIKPEN) 100 UNIT/ML (1 unit dial) KWIKPEN 1 units per 15 gram carb unit before breakfast, lunch and dinner up to 20 units daily 15 mL 1    lisinopril (ZESTRIL) 20 MG tablet Take 1 tablet (20 mg) by mouth daily 90 tablet 1    Sharps Container MISC 1 Units See Admin Instructions .  Use sharps container as instructed to dispose of needles and lancets. 1 each 11    thin (NO BRAND SPECIFIED) lancets Use with lanceting device. To accompany: Blood Glucose Monitor Brands: per insurance. 100 each 6       Allergies  No Known Allergies      Family History  family history includes Alcoholism in his father; Asthma in his father; Colon Cancer in his maternal grandmother; Coronary Artery  "Disease in his maternal grandfather, mother, and paternal grandmother; Depression in his father, maternal grandfather, maternal grandmother, and mother; Diabetes in his maternal grandfather, maternal grandmother, mother, paternal grandfather, and paternal grandmother; Heart Disease in his mother; Hyperlipidemia in his father, maternal grandfather, maternal grandmother, and paternal grandmother; Hypertension in his father and mother; Lung Cancer in his father; Mental Illness in his father; Obesity in his mother; Stomach Problem in his mother.    Social History  Social History     Tobacco Use    Smoking status: Every Day     Packs/day: 1     Types: Cigarettes    Smokeless tobacco: Never   Vaping Use    Vaping Use: Former   Substance Use Topics    Alcohol use: No     Alcohol/week: 0.0 standard drinks of alcohol    Drug use: Not Currently     Types: Marijuana       Physical Exam  /77 (BP Location: Left arm, Patient Position: Chair, Cuff Size: Adult Regular)   Pulse 79   Temp (!) 96.6  F (35.9  C) (Tympanic)   Resp 16   Ht 1.651 m (5' 5\")   Wt 77.2 kg (170 lb 3.2 oz)   SpO2 99%   BMI 28.32 kg/m    Body mass index is 28.32 kg/m .  GENERAL :  In no apparent distress  SKIN: Normal color, normal temperature, texture.  No hirsutism, alopecia or purple striae.     EYES: PERRL, EOMI, No scleral icterus,  No proptosis, conjunctival redness, stare, retraction  RESP: Lungs clear to auscultation bilaterally  CARDIAC: Regular rate and rhythm, normal S1 S2, without murmurs, rubs or gallops    NEURO: awake, alert, responds appropriately to questions.  Cranial nerves intact.   Moves all extremities; Gait normal.  No tremor of the outstretched hand.   EXTREMITIES: No clubbing, cyanosis or edema.    DATA REVIEW  FreeStyle LibreView  Time in target range 75%  High 24% and Very High 1%  Current Ave  mg/dl      "

## 2023-11-18 ENCOUNTER — NURSE TRIAGE (OUTPATIENT)
Dept: NURSING | Facility: CLINIC | Age: 33
End: 2023-11-18

## 2023-11-18 DIAGNOSIS — E11.628 TYPE 2 DIABETES MELLITUS WITH OTHER SKIN COMPLICATION, WITHOUT LONG-TERM CURRENT USE OF INSULIN (H): ICD-10-CM

## 2023-11-18 RX ORDER — INSULIN GLARGINE 100 [IU]/ML
27 INJECTION, SOLUTION SUBCUTANEOUS DAILY
Qty: 15 ML | Refills: 3 | Status: SHIPPED | OUTPATIENT
Start: 2023-11-18 | End: 2024-06-18

## 2023-11-18 NOTE — TELEPHONE ENCOUNTER
Patient calling, states that he is out of Lantus and needs a prescription called in.  Writer called the answering service to page the on call to call the patient back directly. No triage.     The on-call physician called back and stated she would call the patient and get a prescription sent in.     Patient called back and stated that the wrong prescription was sent in and would cost $199.00. Calling answering service back to have Dr. Saleh paged to speak with the patient again.   EDWIN NUNEZ RN    Reason for Disposition   [1] Prescription refill request for ESSENTIAL medicine (i.e., likelihood of harm to patient if not taken) AND [2] triager unable to refill per department policy    Additional Information   Negative: New-onset or worsening symptoms, see that guideline (e.g., diarrhea, runny nose, sore throat)   Negative: Medicine question not related to refill or renewal   Negative: Caller (e.g., patient or pharmacist) requesting information about a new medicine   Negative: Caller requesting information unrelated to medicine    Protocols used: Medication Refill and Renewal Call-A-

## 2024-02-13 ENCOUNTER — TELEPHONE (OUTPATIENT)
Dept: ENDOCRINOLOGY | Facility: CLINIC | Age: 34
End: 2024-02-13

## 2024-02-13 DIAGNOSIS — E11.628 TYPE 2 DIABETES MELLITUS WITH OTHER SKIN COMPLICATION, WITHOUT LONG-TERM CURRENT USE OF INSULIN (H): ICD-10-CM

## 2024-02-13 DIAGNOSIS — Z79.4 TYPE 2 DIABETES MELLITUS WITH HYPERGLYCEMIA, WITH LONG-TERM CURRENT USE OF INSULIN (H): ICD-10-CM

## 2024-02-13 DIAGNOSIS — E11.65 TYPE 2 DIABETES MELLITUS WITH HYPERGLYCEMIA, WITH LONG-TERM CURRENT USE OF INSULIN (H): ICD-10-CM

## 2024-02-19 RX ORDER — INSULIN LISPRO 100 [IU]/ML
INJECTION, SOLUTION INTRAVENOUS; SUBCUTANEOUS
Qty: 15 ML | Refills: 0 | Status: SHIPPED | OUTPATIENT
Start: 2024-02-19

## 2024-02-19 RX ORDER — LISINOPRIL 20 MG/1
20 TABLET ORAL DAILY
Qty: 30 TABLET | Refills: 0 | Status: SHIPPED | OUTPATIENT
Start: 2024-02-19 | End: 2024-03-18

## 2024-02-19 RX ORDER — LISINOPRIL 20 MG/1
20 TABLET ORAL DAILY
Qty: 90 TABLET | Refills: 1 | OUTPATIENT
Start: 2024-02-19

## 2024-02-19 NOTE — TELEPHONE ENCOUNTER
Patient spaced out about appt. Patient is now rescheduled for March pleaser refill pen,sensors and lisinopril as patient is almost out.

## 2024-02-19 NOTE — TELEPHONE ENCOUNTER
Patient received the wrong item, needs pen needles instead of pens, please call if questions.  32 gauge.

## 2024-03-18 ENCOUNTER — OFFICE VISIT (OUTPATIENT)
Dept: ENDOCRINOLOGY | Facility: CLINIC | Age: 34
End: 2024-03-18

## 2024-03-18 VITALS
RESPIRATION RATE: 18 BRPM | SYSTOLIC BLOOD PRESSURE: 147 MMHG | DIASTOLIC BLOOD PRESSURE: 92 MMHG | TEMPERATURE: 97.8 F | WEIGHT: 183.4 LBS | OXYGEN SATURATION: 96 % | HEIGHT: 65 IN | BODY MASS INDEX: 30.56 KG/M2 | HEART RATE: 84 BPM

## 2024-03-18 DIAGNOSIS — E11.628 TYPE 2 DIABETES MELLITUS WITH OTHER SKIN COMPLICATION, WITHOUT LONG-TERM CURRENT USE OF INSULIN (H): ICD-10-CM

## 2024-03-18 PROCEDURE — 84443 ASSAY THYROID STIM HORMONE: CPT | Performed by: PHYSICIAN ASSISTANT

## 2024-03-18 PROCEDURE — 36415 COLL VENOUS BLD VENIPUNCTURE: CPT | Performed by: PHYSICIAN ASSISTANT

## 2024-03-18 PROCEDURE — 99213 OFFICE O/P EST LOW 20 MIN: CPT | Performed by: PHYSICIAN ASSISTANT

## 2024-03-18 RX ORDER — LISINOPRIL 20 MG/1
20 TABLET ORAL DAILY
Qty: 30 TABLET | Refills: 0 | Status: SHIPPED | OUTPATIENT
Start: 2024-03-18 | End: 2024-04-26

## 2024-03-18 RX ORDER — PEN NEEDLE, DIABETIC 32GX 5/32"
NEEDLE, DISPOSABLE MISCELLANEOUS
COMMUNITY
Start: 2023-11-28

## 2024-03-18 NOTE — LETTER
3/18/2024         RE: Ubaldo Zamora  [id number] [facility]] [id number]        Dear Colleague,    Thank you for referring your patient, Ubaldo Zamora, to the [facility]. Please see a copy of my visit note below.    Assessment/Plan :   Type 2 DM. Connor continues to work on managing his blood sugars. He has been frustrated with recurrent low blood sugars while at work. We reviewed his CGMS report and he does have a lot of low blood sugars on Saturday. However, the rest of the week, his blood sugars are primarily on the higher side of normal. I don't want to lower Basaglar every day but I encouraged him to drop his morning dose down to 24 unit(s) on Saturday. He also needs to eat a protein breakfast bowl before work. This should help keep his blood sugars up. I will also place a referral to care coordination in order to help him get enrolled in a low cost insurance option.       I have independently reviewed and interpreted labs, imaging as indicated.      Chief complaint:  Ubaldo is a 33 year old male who returns for follow-up of Type 2 DM.    I have reviewed Care Everywhere including [facility], [facility], [facility], [facility], [place], [facility], [facility] , [facility], [place] lab reports, imaging reports and provider notes as indicated.      HISTORY OF PRESENT ILLNESS  Connor continues to work on managing his blood sugars. Recently, he has been struggling with lows while at work. 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 When he finally gets a minute to sit, his blood sugar is almost always low. He tried decreasing the Basaglar from 27 unit(s) down to 25 unit(s) but that seemed to lead to hyperglycemia on his days off. He is back on 27 unit(s) daily and he continues to take Novolog before meals 1 unit(s) for every 15 grams of carbs. He monitors his blood sugars closely with the FreeStyle Lauar sensor.    Connor has not had any recent trouble with severe hyperglycemia  and/or hypoglycemia. He does have recurrent lows on Saturdays when he is at work. He has also noticed that his vision is a bit worse than before. He is overdue for an eye exam and he plans on scheduling an appt within the next few weeks. He has not had any problems with numbness/tingling in his feet.     Connor was diagnosed with diabetes at the age of 18. He was started on metformin at the time of diagnosis. This worked well for a few years but over time his blood sugars started to increase and he was started on insulin. He has struggled with taking insulin over the years. Partly due to cost. He understands the importance of getting his blood sugars under better control and he has been working hard to keep his A1C down.    Endocrine relevant labs are as follows:   Latest Reference Range & Units 10/16/23 15:24   Hemoglobin A1C 0.0 - 5.6 % 6.3 (H)   (H): Data is abnormally high    REVIEW OF SYSTEMS    Endocrine: positive for diabetes  Skin: negative  Eyes: positive for visual blurring, negative for, redness, tearing  Ears/Nose/Throat: negative  Respiratory: No shortness of breath, dyspnea on exertion, cough, or hemoptysis  Cardiovascular: negative for, chest pain, dyspnea on exertion, lower extremity edema, and exercise intolerance  Gastrointestinal: negative for, nausea, vomiting, constipation, and diarrhea  Genitourinary: negative for, nocturia, dysuria, frequency, and urgency  Musculoskeletal: negative for, muscular weakness, and foot pain  Neurologic: negative for, local weakness, numbness or tingling of hands, and numbness or tingling of feet  Psychiatric: positive for excessive stress  Hematologic/Lymphatic/Immunologic: negative    Past Medical History  Past Medical History:   Diagnosis Date     Diabetes (H)     type 2 diabetes     Diabetes mellitus type 2 with complications (H)     history of DKA, recurring infections       Medications  Current Outpatient Medications   Medication Sig Dispense Refill     alcohol  swab prep pads Use to swab area of injection/kelechi as directed. 100 each 3     BD PEN NEEDLE JORDYN 2ND GEN 32G X 4 MM miscellaneous USE WITH INSULIN PENS       blood glucose (NO BRAND SPECIFIED) test strip Use to test blood sugar daily or as directed. To accompany: Blood Glucose Monitor Brands: per insurance. 300 strip 11     blood glucose calibration (NO BRAND SPECIFIED) solution To accompany: Blood Glucose Monitor Brands: per insurance. 1 each 3     blood glucose monitoring (NO BRAND SPECIFIED) meter device kit Use to test blood sugar as directed. 1 kit 0     clotrimazole (LOTRIMIN) 1 % external cream Apply topically 2 times daily 60 g 1     Continuous Blood Gluc  (FREESTYLE GEOVANY 2 READER) EDDIE Use to read blood sugars as per 's instructions. 1 each 0     Continuous Blood Gluc Sensor (FREESTYLE GEOVANY 2 SENSOR) MISC Change every 14 days. 2 each 0     Glucagon (GVOKE HYPOPEN) 1 MG/0.2ML pen Inject the contents of 1 device under the skin into lower abdomen, outer thigh, or outer upper arm as needed for hypoglycemia. If no response after 15 minutes, additional 1 mg dose from a new device may be injected while waiting for emergency assistance. 0.4 mL 1     insulin aspart (NOVOLOG FLEXPEN) 100 UNIT/ML pen 1 units per 15 gram carb unit before breakfast, lunch and dinner 15 mL 1     insulin glargine (BASAGLAR KWIKPEN) 100 UNIT/ML pen Inject 27 Units Subcutaneous daily 15 mL 3     insulin lispro (HUMALOG KWIKPEN) 100 UNIT/ML (1 unit dial) KWIKPEN 1 units per 15 gram carb unit before breakfast, lunch and dinner up to 20 units daily 15 mL 0     insulin pen needle (32G X 6 MM) 32G X 6 MM miscellaneous Use 4 pen needles daily or as directed. 200 each 0     lisinopril (ZESTRIL) 20 MG tablet Take 1 tablet (20 mg) by mouth daily 30 tablet 0     Sharps Container MISC 1 Units See Admin Instructions .  Use sharps container as instructed to dispose of needles and lancets. 1 each 11     thin (NO BRAND SPECIFIED)  "lancets Use with lanceting device. To accompany: Blood Glucose Monitor Brands: per insurance. 100 each 6       Allergies  No Known Allergies      Family History  family history includes Alcoholism in his father; Asthma in his father; Colon Cancer in his maternal grandmother; Coronary Artery Disease in his maternal grandfather, mother, and paternal grandmother; Depression in his father, maternal grandfather, maternal grandmother, and mother; Diabetes in his maternal grandfather, maternal grandmother, mother, paternal grandfather, and paternal grandmother; Heart Disease in his mother; Hyperlipidemia in his father, maternal grandfather, maternal grandmother, and paternal grandmother; Hypertension in his father and mother; Lung Cancer in his father; Mental Illness in his father; Obesity in his mother; Stomach Problem in his mother.    Social History  Social History     Tobacco Use     Smoking status: Every Day     Packs/day: 1     Types: Cigarettes     Smokeless tobacco: Never   Vaping Use     Vaping Use: Former   Substance Use Topics     Alcohol use: No     Alcohol/week: 0.0 standard drinks of alcohol     Drug use: Not Currently     Types: Marijuana       Physical Exam  BP (!) 150/91 (BP Location: Right arm, Patient Position: Chair, Cuff Size: Adult Regular)   Pulse 84   Temp 97.8  F (36.6  C) (Tympanic)   Resp 18   Ht 1.651 m (5' 5\")   Wt 83.2 kg (183 lb 6.4 oz)   SpO2 96%   BMI 30.52 kg/m    Body mass index is 30.52 kg/m .  GENERAL :  In no apparent distress  SKIN: Normal color, normal temperature, texture.  No hirsutism, alopecia or purple striae.     EYES: PERRL, EOMI, No scleral icterus,  No proptosis, conjunctival redness, stare, retraction  RESP: Lungs clear to auscultation bilaterally  CARDIAC: Regular rate and rhythm, normal S1 S2, without murmurs, rubs or gallops  NEURO: awake, alert, responds appropriately to questions.  Cranial nerves intact.   Moves all extremities; Gait normal.  No tremor of the " outstretched hand.   EXTREMITIES: No clubbing, cyanosis or edema.    DATA REVIEW  FreeStyle LibreView  Time in target range 49%  Low 1%, High 41%, Very High 9%  Current Ave  mg/dl         Again, thank you for allowing me to participate in the care of your patient.        Sincerely,        Yuki Hendrix PA-C

## 2024-03-18 NOTE — PATIENT INSTRUCTIONS
[facility]  Dr Garcia, Endocrinology Department    [address] [id number]  [place] [address]  Appointment [phone]  Fax: [phone]  [place]: Monday - Thursday

## 2024-03-18 NOTE — PROGRESS NOTES
Assessment/Plan :   Type 2 DM. Connor continues to work on managing his blood sugars. He has been frustrated with recurrent low blood sugars while at work. We reviewed his CGMS report and he does have a lot of low blood sugars on Saturday. However, the rest of the week, his blood sugars are primarily on the higher side of normal. I don't want to lower Basaglar every day but I encouraged him to drop his morning dose down to 24 unit(s) on Saturday. He also needs to eat a protein breakfast bowl before work. This should help keep his blood sugars up. I will also place a referral to care coordination in order to help him get enrolled in a low cost insurance option.       I have independently reviewed and interpreted labs, imaging as indicated.      Chief complaint:  Ubaldo is a 33 year old male who returns for follow-up of Type 2 DM.    I have reviewed Care Everywhere including [facility], [facility], [facility], [facility], [place], [facility], [facility] , [facility], [place] lab reports, imaging reports and provider notes as indicated.      HISTORY OF PRESENT ILLNESS  Connor continues to work on managing his blood sugars. Recently, he has been struggling with lows while at work. 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 When he finally gets a minute to sit, his blood sugar is almost always low. He tried decreasing the Basaglar from 27 unit(s) down to 25 unit(s) but that seemed to lead to hyperglycemia on his days off. He is back on 27 unit(s) daily and he continues to take Novolog before meals 1 unit(s) for every 15 grams of carbs. He monitors his blood sugars closely with the FreeStyle Laura sensor.    Connor has not had any recent trouble with severe hyperglycemia and/or hypoglycemia. He does have recurrent lows on Saturdays when he is at work. He has also noticed that his vision is a bit worse than before. He is overdue for an eye exam and he plans on scheduling an appt within the next few weeks. He has not had any  problems with numbness/tingling in his feet.     Connor was diagnosed with diabetes at the age of 18. He was started on metformin at the time of diagnosis. This worked well for a few years but over time his blood sugars started to increase and he was started on insulin. He has struggled with taking insulin over the years. Partly due to cost. He understands the importance of getting his blood sugars under better control and he has been working hard to keep his A1C down.    Endocrine relevant labs are as follows:   Latest Reference Range & Units 10/16/23 15:24   Hemoglobin A1C 0.0 - 5.6 % 6.3 (H)   (H): Data is abnormally high    REVIEW OF SYSTEMS    Endocrine: positive for diabetes  Skin: negative  Eyes: positive for visual blurring, negative for, redness, tearing  Ears/Nose/Throat: negative  Respiratory: No shortness of breath, dyspnea on exertion, cough, or hemoptysis  Cardiovascular: negative for, chest pain, dyspnea on exertion, lower extremity edema, and exercise intolerance  Gastrointestinal: negative for, nausea, vomiting, constipation, and diarrhea  Genitourinary: negative for, nocturia, dysuria, frequency, and urgency  Musculoskeletal: negative for, muscular weakness, and foot pain  Neurologic: negative for, local weakness, numbness or tingling of hands, and numbness or tingling of feet  Psychiatric: positive for excessive stress  Hematologic/Lymphatic/Immunologic: negative    Past Medical History  Past Medical History:   Diagnosis Date    Diabetes (H)     type 2 diabetes    Diabetes mellitus type 2 with complications (H)     history of DKA, recurring infections       Medications  Current Outpatient Medications   Medication Sig Dispense Refill    alcohol swab prep pads Use to swab area of injection/kelechi as directed. 100 each 3    BD PEN NEEDLE JORDYN 2ND GEN 32G X 4 MM miscellaneous USE WITH INSULIN PENS      blood glucose (NO BRAND SPECIFIED) test strip Use to test blood sugar daily or as directed. To  accompany: Blood Glucose Monitor Brands: per insurance. 300 strip 11    blood glucose calibration (NO BRAND SPECIFIED) solution To accompany: Blood Glucose Monitor Brands: per insurance. 1 each 3    blood glucose monitoring (NO BRAND SPECIFIED) meter device kit Use to test blood sugar as directed. 1 kit 0    clotrimazole (LOTRIMIN) 1 % external cream Apply topically 2 times daily 60 g 1    Continuous Blood Gluc  (FREESTYLE GEOVANY 2 READER) EDDIE Use to read blood sugars as per 's instructions. 1 each 0    Continuous Blood Gluc Sensor (FREESTYLE GEOVANY 2 SENSOR) MISC Change every 14 days. 2 each 0    Glucagon (GVOKE HYPOPEN) 1 MG/0.2ML pen Inject the contents of 1 device under the skin into lower abdomen, outer thigh, or outer upper arm as needed for hypoglycemia. If no response after 15 minutes, additional 1 mg dose from a new device may be injected while waiting for emergency assistance. 0.4 mL 1    insulin aspart (NOVOLOG FLEXPEN) 100 UNIT/ML pen 1 units per 15 gram carb unit before breakfast, lunch and dinner 15 mL 1    insulin glargine (BASAGLAR KWIKPEN) 100 UNIT/ML pen Inject 27 Units Subcutaneous daily 15 mL 3    insulin lispro (HUMALOG KWIKPEN) 100 UNIT/ML (1 unit dial) KWIKPEN 1 units per 15 gram carb unit before breakfast, lunch and dinner up to 20 units daily 15 mL 0    insulin pen needle (32G X 6 MM) 32G X 6 MM miscellaneous Use 4 pen needles daily or as directed. 200 each 0    lisinopril (ZESTRIL) 20 MG tablet Take 1 tablet (20 mg) by mouth daily 30 tablet 0    Sharps Container MISC 1 Units See Admin Instructions .  Use sharps container as instructed to dispose of needles and lancets. 1 each 11    thin (NO BRAND SPECIFIED) lancets Use with lanceting device. To accompany: Blood Glucose Monitor Brands: per insurance. 100 each 6       Allergies  No Known Allergies      Family History  family history includes Alcoholism in his father; Asthma in his father; Colon Cancer in his maternal  "grandmother; Coronary Artery Disease in his maternal grandfather, mother, and paternal grandmother; Depression in his father, maternal grandfather, maternal grandmother, and mother; Diabetes in his maternal grandfather, maternal grandmother, mother, paternal grandfather, and paternal grandmother; Heart Disease in his mother; Hyperlipidemia in his father, maternal grandfather, maternal grandmother, and paternal grandmother; Hypertension in his father and mother; Lung Cancer in his father; Mental Illness in his father; Obesity in his mother; Stomach Problem in his mother.    Social History  Social History     Tobacco Use    Smoking status: Every Day     Packs/day: 1     Types: Cigarettes    Smokeless tobacco: Never   Vaping Use    Vaping Use: Former   Substance Use Topics    Alcohol use: No     Alcohol/week: 0.0 standard drinks of alcohol    Drug use: Not Currently     Types: Marijuana       Physical Exam  BP (!) 150/91 (BP Location: Right arm, Patient Position: Chair, Cuff Size: Adult Regular)   Pulse 84   Temp 97.8  F (36.6  C) (Tympanic)   Resp 18   Ht 1.651 m (5' 5\")   Wt 83.2 kg (183 lb 6.4 oz)   SpO2 96%   BMI 30.52 kg/m    Body mass index is 30.52 kg/m .  GENERAL :  In no apparent distress  SKIN: Normal color, normal temperature, texture.  No hirsutism, alopecia or purple striae.     EYES: PERRL, EOMI, No scleral icterus,  No proptosis, conjunctival redness, stare, retraction  RESP: Lungs clear to auscultation bilaterally  CARDIAC: Regular rate and rhythm, normal S1 S2, without murmurs, rubs or gallops  NEURO: awake, alert, responds appropriately to questions.  Cranial nerves intact.   Moves all extremities; Gait normal.  No tremor of the outstretched hand.   EXTREMITIES: No clubbing, cyanosis or edema.    DATA REVIEW  FreeStyle LibreView  Time in target range 49%  Low 1%, High 41%, Very High 9%  Current Ave  mg/dl       "

## 2024-03-19 ENCOUNTER — PATIENT OUTREACH (OUTPATIENT)
Dept: CARE COORDINATION | Facility: CLINIC | Age: 34
End: 2024-03-19

## 2024-03-19 DIAGNOSIS — Z71.89 OTHER SPECIFIED COUNSELING: Primary | Chronic | ICD-10-CM

## 2024-03-19 LAB — TSH SERPL DL<=0.005 MIU/L-ACNC: 1.24 UIU/ML (ref 0.3–4.2)

## 2024-03-19 NOTE — PROGRESS NOTES
Clinic Care Coordination Contact  Community Health Worker Initial Outreach    CHW Initial Information Gathering:  Referral Source: Specialist (Endocrinology)  Current living arrangement:: Not Assessed  Supplies Currently Used at Home: Diabetic Supplies  Informal Support system:: Significant other  No PCP office visit in Past Year: Yes       Patient accepts CC: Yes. Patient scheduled for assessment with CC RN on 3/20/2024 at 2:00 PM. Patient noted desire to discuss Complex Medical Concerns/Education and help with dental referral - has dental pain.     CHW was able to connect with the Patient and introduce self/care coordination and intent of call. Patient shares that he has tried to navigate obtaining health insurance, however has had a hard time trying to navigate this. Writer introduced the purpose of the FRW, Patient would like a referral. CHW placed a FRW referral on this date, 3/19/2024.   Patient shares that he pays between  bucks for his medications. For his meds, he uses GoodRX, the [facility] Coupon, and the insulin  program savings card.  Per Chart Review, Patient has [employer]. Patient shares it cut down on 60 percent of his bill.     Patient is on his way to work. No additional CC need identified during the time of call.     Mickie HUSTON [facility]  Community Health Worker  [facility]:  [facility] Care Coordination  [phone]

## 2024-03-20 ENCOUNTER — APPOINTMENT (OUTPATIENT)
Dept: NURSING | Facility: CLINIC | Age: 34
End: 2024-03-20

## 2024-03-20 ENCOUNTER — PATIENT OUTREACH (OUTPATIENT)
Dept: CARE COORDINATION | Facility: CLINIC | Age: 34
End: 2024-03-20

## 2024-03-20 ASSESSMENT — ACTIVITIES OF DAILY LIVING (ADL): DEPENDENT_IADLS:: INDEPENDENT

## 2024-03-20 NOTE — LETTER
[facility]  Patient Centered Plan of Care  About Me:        Patient Name:  Ubaldo Zamora    YOB: 1990  Age:         33 year old   [place] MRN:    [id number] Telephone Information:  Home Phone [phone]   Mobile [phone]       Address:  [address] [id number] Email address:  [email or web address]      Emergency Contact(s)    Name Relationship Lgl Grd Work Phone Home Phone Mobile Phone   1. JIN,MIA Significant ot*   [phone]            Primary language:  English     needed? No   [place]:  [phone] op. 1  Other communication barriers:None    Preferred Method of Communication:  Phone  Current living arrangement: Other (Lives with roommates)    Mobility Status/ Medical Equipment: Independent    Health Maintenance  Health Maintenance Reviewed: Due/Overdue (Discussed with patient.)    My Access Plan  Medical Emergency 911   Primary Clinic Line [facility] [phone]   24 Hour Appointment Line [phone] or  [phone]) (toll-free)   24 Hour Nurse Line [phone] (toll-free)   Preferred Urgent Care No data recorded   Preferred Hospital [facility]  [phone]     Preferred Pharmacy [facility] #[id number] [address] AT [address] [place] [address]     Behavioral Health Crisis Line The National Suicide Prevention Lifeline at 1-652.345.3994 or Text/Call 818           My Care Team Members  Patient Care Team         Relationship Specialty Notifications Start End    Rell Byrd DO PCP - General Family Medicine  6/1/22     Phone: [phone] Fax: [phone] [address] [address]    Rell Byrd DO Assigned PCP   6/11/22     Phone: [phone] Fax: [phone] [address] [facility] [address]    Pati Payan [facility] Pharmacist Pharmacist  6/13/22     Phone: [phone] Fax:  [phone]         [address] [facility] [address]    Yuki Hendrix PA-C Physician Assistant Endocrinology, Diabetes, and Metabolism  7/18/23     Phone: [phone] Fax: [phone] [address] [facility] [address]    Rosa Wilson Diabetes Educator Dietitian, Registered  7/26/23      [address] [facility] [address]    Yuki Hendrix PA-C Assigned Endocrinology Provider   2/2/24     Phone: [phone] Fax: [phone]         [address] [facility] [address]    Rina Thomas, MENDEL Lead Care Coordinator Primary Care -  Admissions 3/19/24     Phone: [phone]         Evangelina Marie Financial Resource Worker   3/19/24     Phone: [phone]                     My Care Plans  Self Management and Treatment Plan    Care Plan  Care Plan: Advance Care Plan       Problem: Patient does not have a valid Health Care Directive       Goal: Complete Health Care Directive       Start Date: 3/20/2024 Expected End Date: 9/18/2024    This Visit's Progress: 0%    Note:     Barriers: Provider availability - wait time to complete appointments.   Strengths: Motivated; Agreeable to Care Coordination.   Patient expressed understanding of goal: Yes.   Action steps to achieve this goal:  1. I will review the resources for Honoring Choices.   2. I will contact Honoring Choices with any questions or when I am ready to complete my Advance Care Planning documents.   3. I will contact my care team with any questions, concerns or support needs. I will use the clinic as a resource and I understand I can contact my clinic with 24/7 after hours services available. Care Coordinator will remain available as needed.                               Care Plan: Health Maintenance       Problem: Health Maintenance Due or Overdue       Goal: Become up-to-date with health maintenance visit(s)       Start Date: 3/20/2024 Expected End Date: 9/18/2024    This Visit's Progress: 0%    Note:     Barriers: Provider  availability - wait time to complete appointments.   Strengths: Motivated; Agreeable to Care Coordination.   Patient expressed understanding of goal: Yes.   Action steps to achieve this goal:  1. I will take my medications as prescribed.    2. I will discuss, review, schedule and complete recommended overdue health maintenance with my Primary Care Provider.   3. I will contact my care team with any questions, concerns or support needs. I will use the clinic as a resource and I understand I can contact my clinic with 24/7 after hours services available. Care Coordinator will remain available as needed.                             Care Plan: Resources for Dental Clinics       Problem: Resources for Dental Clinics       Goal: Dental Clinic Resources       Start Date: 3/20/2024 Expected End Date: 9/18/2024    This Visit's Progress: 0%    Note:     Barriers: Provider availability - wait time to complete appointments.   Strengths: Motivated; Agreeable to Care Coordination.   Patient expressed understanding of goal: Yes.   Action steps to achieve this goal:  1. I will review the resources for Low Cost Dental Clinics.   2. I will contact my Care Coordinator with any questions or if I need additional resources.   3. I will contact my care team with any questions, concerns or support needs. I will use the clinic as a resource and I understand I can contact my clinic with 24/7 after hours services available. Care Coordinator will remain available as needed.                               Action Plans on File:                       Advance Care Plans/Directives:   Advanced Care Plan/Directives on file:   No    Discussed with patient/caregiver(s):   Referral to Honoring Choices             My Medical and Care Information  Problem List   Patient Active Problem List   Diagnosis    DKA (diabetic ketoacidoses)    Acute pancreatitis    Cellulitis of perineum    Hyperglycemia    Poorly controlled diabetes mellitus (H)    Calculus of  gallbladder without cholecystitis without obstruction      Current Medications and Allergies:  See printed Medication Report.    Care Coordination Start Date: 3/18/2024   Frequency of Care Coordination: monthly, more frequently as needed     Form Last Updated: 03/20/2024

## 2024-03-20 NOTE — LETTER
[facility] CARE COORDINATION  [address]  [facility] [address]    March 20, 2024    Ubaldo Zamora  [address]  [facility] [address]      Dear Ubaldo,    I am a clinic care coordinator who works with Rell Byrd DO with the [facility]. I wanted to introduce myself and provide you with my contact information for you to be able to call me with any questions or concerns. I wanted to thank you for spending the time to talk with me.  Below is a description of clinic care coordination and how I can further assist you.       The clinic care coordination team is made up of a registered nurse, , financial resource worker and community health worker who understand the health care system. The goal of clinic care coordination is to help you manage your health and improve access to the health care system. Our team works alongside your provider to assist you in determining your health and social needs. We can help you obtain health care and community resources, providing you with necessary information and education. We can work with you through any barriers and develop a care plan that helps coordinate and strengthen the communication between you and your care team.  Our services are voluntary and are offered without charge to you personally.    Please feel free to contact me with any questions or concerns regarding care coordination and what we can offer.      We are focused on providing you with the highest-quality healthcare experience possible.    Sincerely,     Rina Thomas RN, BSN, CPHN, [facility] Ambulatory Care Management  [facility] [place]  Phone: [phone]  Email: [email or web address]    Mickie HUSTON [facility]  Community Health Worker  [facility]  Clinic Care Coordination  [phone]      Enclosed: I have enclosed a copy of the Patient Centered Plan of Care. This has helpful  information and goals that we have talked about. Please keep this in an easy to access place to use as needed. Also included are resources for [employer] and low cost dental clinics.     [facility] Patria Corley provides support to ealth [place], [facility], and [place] locations for ensuring system Advance Care Planning (ACP) and Surrogate Decision-Maker processes comply with Federal and State regulatory requirements and align with system goals and focus areas.      HOURS: Monday-Friday 6a to 5p (with exception of 8 department holidays as indicated on dept email/voicemail)  AFTER HOURS: For emergency validation of documents contact your assigned  per site protocol      CONTACT:    EMAIL: [email or web address]   PHONE: [phone]  EPIC: Patria Corley [place]    Office: [address] [place] [address] [facility] [address] (We are mostly remote with varying on-site hours)    [email or web address]        Low Cost Dental Providers     [facility]   http://www.[facility].org/   [address], [place], [place] [address] [phone]   Low-cost dental care for seniors, people with physical and mental disabilities, and people with lower incomes.     [facility]   http://cdAdena Regional Medical Centerc.[email or web address]/   [address] [place] [address] [phone]   Offers sliding fee scale dental services based on your family size and income. Accepts some private insurance and Medical Assistance and [place].     [facility]   [email or web address]   [address] [place] [address]   [phone]?   Offers sliding fee scale dental services based on your family size and income. Accepts Medical Assistance and [place].     [facility]   http://dental.[email or web address]/   [address] [place] [address], [address], [place] [id number] [phone]   The  [facility] Clinic provides low-cost dental care for patients without dental insurance with an income level at 200% or below of the federal poverty level. They serve patients ages 2 -adults. Provide exams,x-rays,fillings,and other limited services. Services are provided at a minimal fee.     [facility]   http://www.[email or web address]-dentalcare.com/   [address] [facility] [address], [place] [address]   [phone]   Dental services for patients enrolled in Medical Assistance or [facility] ONLY under [employer], [employer], [employer], [facility], or [facility]. Does not offer lower costs or sliding fee services.   [facility]   http://[email or web address]/dental   [address] [place] [address]   [phone]   Sliding fee scale dental services. Accepts Medical Assistance.     [facility] [place] [facility]   [email or web address]://[email or web address]   [address] [place] [id number] [phone] or [phone]   Discounted emergency dental services if paid in full at time of service. Payment plan options available for bills more than $500. Accepts most Medical Assistance, but call and ask before scheduling an appointment.     [facility] Mobile Dental Clinic   [email or web address]://home.[facility].[email or web address]/en-us/health-plans/dental/mobile-dental-clinic/   [address] (Headquarters, but the clinic is mobile)[place] [address]   [phone]   offers dental check-ups, cleanings and simple restorative care to [facility] members who have limited access to quality dental care. All care is provided by faculty-supervised dental, dental hygiene and dental therapy students from the [employer] [facility] [employer], [facility]'s Mobile Dental Clinic (MDC) partner.     [facility]   [email or web address]://[email or web address]/   [address]  [address], [place] [address]   [phone]   Provides free dental services to all who don't have  insurance. Appointments are required. Please call for an appointment. Day and evening appointments available.     [facility]   http://www.[facility].org/   [address] [place] [address]   [phone]   Low-cost, flexible, sliding fee payment options for mental and physical health care services, including an urgent care clinic, for low-income, underinsured, or uninsured patients. Dental service provided by mobile dental unit. Accepts private insurance and Medical Assistance and [place].     [facility]   [email or web address]   [address] [place] [address]   [phone]   [facility] provides quality, low-cost preventive, restorative, and emergency dental services for insured, uninsured and underinsured patients using a sliding fee scale and accepts Medical Assistance and [place].

## 2024-03-20 NOTE — LETTER
[facility]  Patient Centered Plan of Care  About Me:        Patient Name:  Ubaldo Zamora    YOB: 1990  Age:         33 year old   [place] MRN:    [id number] Telephone Information:  Home Phone [phone]   Mobile [phone]       Address:  [address] [id number] Email address:  [email or web address]      Emergency Contact(s)    Name Relationship Lgl Grd Work Phone Home Phone Mobile Phone   1. JINANTONIA Significant ot*   [phone]            Primary language:  English     needed? No   [place] Language Services:  [phone] op. 1  Other communication barriers:None    Preferred Method of Communication:  Phone  Current living arrangement: Other (Lives with roommates)    Mobility Status/ Medical Equipment: Independent    Health Maintenance  Health Maintenance Reviewed: Due/Overdue (Discussed with patient.)    My Access Plan  Medical Emergency 911   Primary Clinic Line [facility] [phone]   24 Hour Appointment Line [phone] or  [phone]) (toll-free)   24 Hour Nurse Line [phone] (toll-free)   Preferred Urgent Care No data recorded   Preferred Hospital [facility]  [phone]     Preferred Pharmacy [facility] #[id number] [address] AT [address] [place] [address]     Behavioral Health Crisis Line The National Suicide Prevention Lifeline at 1-592.486.7632 or Text/Call 148     My Care Team Members  Patient Care Team         Relationship Specialty Notifications Start End    Rell Byrd DO PCP - General Family Medicine  6/1/22     Phone: [phone] Fax: [phone] [address] [facility] [address]    Rell Byrd DO Assigned PCP   6/11/22     Phone: [phone] Fax: [phone] [address] [facility] [address]    Pati Payan, [facility] Pharmacist Pharmacist  6/13/22     Phone: [phone] Fax: [phone]          [address] [facility] [address]    Yuki Hendrix PA-C Physician Assistant Endocrinology, Diabetes, and Metabolism  7/18/23     Phone: [phone] Fax: [phone] [address] [facility] [address]    Rosa Wilson Diabetes Educator Dietitian, Registered  7/26/23      [address] [facility] [address]    Yuki Hendrix PA-C Assigned Endocrinology Provider   2/2/24     Phone: [phone] Fax: [phone] [address] [facility] [address]    Rina Thoams, MENDEL Lead Care Coordinator Primary Care - CC Admissions 3/19/24     Phone: [phone]         Evangelina Marie Financial Resource Worker   3/19/24     Phone: [phone]         Mickie Jacobo CHW Community Health Worker  Admissions 3/20/24     Phone: [phone]                   My Care Plans  Self Management and Treatment Plan    Care Plan  Care Plan: Advance Care Plan       Problem: Patient does not have a valid Health Care Directive       Goal: Complete Health Care Directive       Start Date: 3/20/2024 Expected End Date: 9/18/2024    This Visit's Progress: 0%    Note:     Barriers: Provider availability - wait time to complete appointments.   Strengths: Motivated; Agreeable to Care Coordination.   Patient expressed understanding of goal: Yes.   Action steps to achieve this goal:  1. I will review the resources for Honoring Choices.   2. I will contact [facility]oring Choices with any questions or when I am ready to complete my Advance Care Planning documents.   3. I will contact my care team with any questions, concerns or support needs. I will use the clinic as a resource and I understand I can contact my clinic with 24/7 after hours services available. Care Coordinator will remain available as needed.                               Care Plan: Health Maintenance       Problem: Health Maintenance Due or Overdue       Goal: Become up-to-date with health maintenance visit(s)       Start Date: 3/20/2024 Expected End  Date: 9/18/2024    This Visit's Progress: 0%    Note:     Barriers: Provider availability - wait time to complete appointments.   Strengths: Motivated; Agreeable to Care Coordination.   Patient expressed understanding of goal: Yes.   Action steps to achieve this goal:  1. I will take my medications as prescribed.    2. I will discuss, review, schedule and complete recommended overdue health maintenance with my Primary Care Provider.   3. I will contact my care team with any questions, concerns or support needs. I will use the clinic as a resource and I understand I can contact my clinic with 24/7 after hours services available. Care Coordinator will remain available as needed.                             Care Plan: Resources for Dental Clinics       Problem: Resources for Dental Clinics       Goal: Dental Clinic Resources       Start Date: 3/20/2024 Expected End Date: 9/18/2024    This Visit's Progress: 0%    Note:     Barriers: Provider availability - wait time to complete appointments.   Strengths: Motivated; Agreeable to Care Coordination.   Patient expressed understanding of goal: Yes.   Action steps to achieve this goal:  1. I will review the resources for Low Cost Dental Clinics.   2. I will contact my Care Coordinator with any questions or if I need additional resources.   3. I will contact my care team with any questions, concerns or support needs. I will use the clinic as a resource and I understand I can contact my clinic with 24/7 after hours services available. Care Coordinator will remain available as needed.                             Action Plans on File:     Advance Care Plans/Directives:   Advanced Care Plan/Directives on file:   No    Discussed with patient/caregiver(s):   Referral to Honoring Choices           My Medical and Care Information  Problem List   Patient Active Problem List   Diagnosis    DKA (diabetic ketoacidoses)    Acute pancreatitis    Cellulitis of perineum    Hyperglycemia     Poorly controlled diabetes mellitus (H)    Calculus of gallbladder without cholecystitis without obstruction      Current Medications and Allergies:    Current Outpatient Medications   Medication    alcohol swab prep pads    BD PEN NEEDLE JORDYN 2ND GEN 32G X 4 MM miscellaneous    blood glucose (NO BRAND SPECIFIED) test strip    blood glucose calibration (NO BRAND SPECIFIED) solution    blood glucose monitoring (NO BRAND SPECIFIED) meter device kit    clotrimazole (LOTRIMIN) 1 % external cream    Continuous Blood Gluc  (FREESTYLE GEOVANY 2 READER) EDDIE    Continuous Blood Gluc Sensor (FREESTYLE GEOVANY 2 SENSOR) MISC    Glucagon (GVOKE HYPOPEN) 1 MG/0.2ML pen    insulin aspart (NOVOLOG FLEXPEN) 100 UNIT/ML pen    insulin glargine (BASAGLAR KWIKPEN) 100 UNIT/ML pen    insulin lispro (HUMALOG KWIKPEN) 100 UNIT/ML (1 unit dial) KWIKPEN    insulin pen needle (32G X 6 MM) 32G X 6 MM miscellaneous    lisinopril (ZESTRIL) 20 MG tablet    Sharps Container MISC    thin (NO BRAND SPECIFIED) lancets     No current facility-administered medications for this visit.      No Known Allergies    Care Coordination Start Date: 3/18/2024   Frequency of Care Coordination: monthly, more frequently as needed     Form Last Updated: 03/20/2024

## 2024-03-20 NOTE — PROGRESS NOTES
Clinic Care Coordination Contact  Clinic Care Coordination Contact  OUTREACH    Referral Information: RN CC contacted patient for scheduled outreach to complete assessment for enrollment in Care Coordination.   Referral Source: Care Team      Chief Complaint   Patient presents with    Clinic Care Coordination - Initial      Universal Utilization: Risk of admission or ED visit 46.7%  Clinic Utilization  Difficulty keeping appointments:: No  Compliance Concerns: No  No-Show Concerns: No  No PCP office visit in Past Year: Yes  Utilization      No Show Count (past year)  2             ED Visits  1             Hospital Admissions  1                    Current as of: 3/20/2024  1:29 PM              Clinical Concerns:  Current Medical Concerns:  Ongoing dental pain.     Current Behavioral Concerns: None    Education Provided to patient: Educated patient on Care Coordination: outreach schedule, resources available; shared responsibility of goals & goals to achieve.    Pain  Pain (GOAL):: No  Health Maintenance Reviewed: Due/Overdue (Discussed with patient)  Clinical Pathway: None    Medication Management:  Medication review status: Medications reviewed on 3/18/24. RN CC did not re-review during assessment.   Current Outpatient Medications   Medication    alcohol swab prep pads    BD PEN NEEDLE JORDYN 2ND GEN 32G X 4 MM miscellaneous    blood glucose (NO BRAND SPECIFIED) test strip    blood glucose calibration (NO BRAND SPECIFIED) solution    blood glucose monitoring (NO BRAND SPECIFIED) meter device kit    clotrimazole (LOTRIMIN) 1 % external cream    Continuous Blood Gluc  (FREESTYLE GEOVANY 2 READER) EDDIE    Continuous Blood Gluc Sensor (FREESTYLE GEOVANY 2 SENSOR) MISC    Glucagon (GVOKE HYPOPEN) 1 MG/0.2ML pen    insulin aspart (NOVOLOG FLEXPEN) 100 UNIT/ML pen    insulin glargine (BASAGLAR KWIKPEN) 100 UNIT/ML pen    insulin lispro (HUMALOG KWIKPEN) 100 UNIT/ML (1 unit dial) KWIKPEN    insulin pen needle (32G X 6 MM)  32G X 6 MM miscellaneous    lisinopril (ZESTRIL) 20 MG tablet    Sharps Container MISC    thin (NO BRAND SPECIFIED) lancets     No current facility-administered medications for this visit.      No Known Allergies    Functional Status:  Dependent ADLs:: Independent  Dependent IADLs:: Independent  Bed or wheelchair confined:: No  Fallen 2 or more times in the past year?: No  Any fall with injury in the past year?: No    Living Situation:  Current living arrangement:: Other (Lives with roommates)  Type of residence:: Town home    Lifestyle & Psychosocial Needs:    Social Determinants of Health     Food Insecurity: Low Risk  (3/20/2024)    Food Insecurity     Within the past 12 months, did you worry that your food would run out before you got money to buy more?: No     Within the past 12 months, did the food you bought just not last and you didn t have money to get more?: No   Depression: Not at risk (6/1/2022)    PHQ-2     PHQ-2 Score: 0   Housing Stability: Low Risk  (3/20/2024)    Housing Stability     Do you have housing? : Yes     Are you worried about losing your housing?: No   Tobacco Use: High Risk (3/18/2024)    Patient History     Smoking Tobacco Use: Every Day     Smokeless Tobacco Use: Never     Passive Exposure: Not on file   Financial Resource Strain: Low Risk  (3/20/2024)    Financial Resource Strain     Within the past 12 months, have you or your family members you live with been unable to get utilities (heat, electricity) when it was really needed?: No   Alcohol Use: Not on file   Transportation Needs: Low Risk  (3/20/2024)    Transportation Needs     Within the past 12 months, has lack of transportation kept you from medical appointments, getting your medicines, non-medical meetings or appointments, work, or from getting things that you need?: No   Physical Activity: Not on file   Interpersonal Safety: Not on file   Stress: Not on file   Social Connections: Not on file     Diet:: Regular  Inadequate  nutrition (GOAL):: No  Tube Feeding: No  Inadequate activity/exercise (GOAL):: No  Significant changes in sleep pattern (GOAL): No  Transportation means:: Regular car     Yazdanism or spiritual beliefs that impact treatment:: No  Mental health DX:: No  Mental health management concern (GOAL):: No  Chemical Dependency Status: No Current Concerns  Chemical Dependency Management:  (N/A)  Informal Support system:: Significant other, Family      Resources and Interventions:  Current Resources:      Community Resources: None  Supplies Currently Used at Home: Diabetic Supplies  Equipment Currently Used at Home: none  Employment Status: employed full-time     Advance Care Plan/Directive  Advanced Care Plans/Directives on file:: No  Discussed with patient/caregiver:: Referral to Patria Corley    Referrals Placed: Patria Corley     Care Plan:  Care Plan: Advance Care Plan       Problem: Patient does not have a valid Health Care Directive       Goal: Complete Health Care Directive       Start Date: 3/20/2024 Expected End Date: 9/18/2024    This Visit's Progress: 0%    Note:     Barriers: Provider availability - wait time to complete appointments.   Strengths: Motivated; Agreeable to Care Coordination.   Patient expressed understanding of goal: Yes.   Action steps to achieve this goal:  1. I will review the resources for Patria Corley.   2. I will contact [facility] with any questions or when I am ready to complete my Advance Care Planning documents.   3. I will contact my care team with any questions, concerns or support needs. I will use the clinic as a resource and I understand I can contact my clinic with 24/7 after hours services available. Care Coordinator will remain available as needed.                               Care Plan: Health Maintenance       Problem: Health Maintenance Due or Overdue       Goal: Become up-to-date with health maintenance visit(s)       Start Date: 3/20/2024 Expected End Date:  9/18/2024    This Visit's Progress: 0%    Note:     Barriers: Provider availability - wait time to complete appointments.   Strengths: Motivated; Agreeable to Care Coordination.   Patient expressed understanding of goal: Yes.   Action steps to achieve this goal:  1. I will take my medications as prescribed.    2. I will discuss, review, schedule and complete recommended overdue health maintenance with my Primary Care Provider.   3. I will contact my care team with any questions, concerns or support needs. I will use the clinic as a resource and I understand I can contact my clinic with 24/7 after hours services available. Care Coordinator will remain available as needed.                             Care Plan: Resources for Dental Clinics       Problem: Resources for Dental Clinics       Goal: Dental Clinic Resources       Start Date: 3/20/2024 Expected End Date: 9/18/2024    This Visit's Progress: 0%    Note:     Barriers: Provider availability - wait time to complete appointments.   Strengths: Motivated; Agreeable to Care Coordination.   Patient expressed understanding of goal: Yes.   Action steps to achieve this goal:  1. I will review the resources for Low Cost Dental Clinics.   2. I will contact my Care Coordinator with any questions or if I need additional resources.   3. I will contact my care team with any questions, concerns or support needs. I will use the clinic as a resource and I understand I can contact my clinic with 24/7 after hours services available. Care Coordinator will remain available as needed.                             Patient/Caregiver understanding: Patient/caregiver verbalized understanding and denies any additional questions or concerns at this time. RNCC engaged in [employer] communications during encounter.     Outreach Frequency: monthly, more frequently as needed    Future Appointments                In 3 months Yuki Hendrix PA-C [place], [place]            Plan: RN  CC sent patient introduction letter with contact information; copied & attached to bottom of letter resources for Honoring Choices and Low Cost Dental Clinics. Also sent Patient-centered Plan of Care to patient via his [employer] account. Verified patient does access his [employer] account.     Rina Thomas RN, BSN, CPHN, [facility] Ambulatory Care Management  [facility]  Phone: [phone]  Email: [email or web address]

## 2024-03-20 NOTE — LETTER
[facility] CARE COORDINATION  [address]  [place] [address]    March 20, 2024    Ubaldo Zamora  [address]  [place] [address]      Dear Ubaldo,    I am a clinic care coordinator who works with Rell Byrd DO with the [facility]. I wanted to introduce myself and provide you with my contact information for you to be able to call me with any questions or concerns. I wanted to thank you for spending the time to talk with me.  Below is a description of clinic care coordination and how I can further assist you.       The clinic care coordination team is made up of a registered nurse, , financial resource worker and community health worker who understand the health care system. The goal of clinic care coordination is to help you manage your health and improve access to the health care system. Our team works alongside your provider to assist you in determining your health and social needs. We can help you obtain health care and community resources, providing you with necessary information and education. We can work with you through any barriers and develop a care plan that helps coordinate and strengthen the communication between you and your care team.  Our services are voluntary and are offered without charge to you personally.    Please feel free to contact me with any questions or concerns regarding care coordination and what we can offer.      We are focused on providing you with the highest-quality healthcare experience possible.    Sincerely,     Rina Thomas RN, BSN, CPHN, [facility] Ambulatory Care Management  [facility]  Phone: [phone]  Email: [email or web address]    Mickie HUSTON [facility]  Community Health Worker  [facility]  Clinic Care Coordination  [phone]      Enclosed: I have enclosed a copy of the Patient Centered Plan of Care. This has helpful  information and goals that we have talked about. Please keep this in an easy to access place to use as needed. Also included are resources for Honoring Choices and low cost dental clinics.       Low Cost Dental Providers     [facility]   [email or web address]   [address] [address]   [phone]   Low-cost dental care for seniors, people with physical and mental disabilities, and people with lower incomes.     [facility]   [email or web address]   [address] [place] [address]   [phone]   Offers sliding fee scale dental services based on your family size and income. Accepts some private insurance and Medical Assistance and [place].     [facility]   [email or web address]   [address] [place] [address]   [phone]?   Offers sliding fee scale dental services based on your family size and income. Accepts Medical Assistance and [place].     [facility]   [email or web address]   [address] [place] [address], [address], [place] [address]   [phone]   [facility] provides low-cost dental care for patients without dental insurance with an income level at 200% or below of the federal poverty level. They serve patients ages 2 -adults. Provide exams,x-rays,fillings,and other limited services. Services are provided at a minimal fee.     [facility]   [email or web address]   [address], [place] [address]   [phone]   Dental services for patients enrolled in Medical Assistance or [place] ONLY under [employer], [employer], Blue Plus, [facility], or [facility]. Does not offer lower costs or sliding fee services.   [facility]   [email or web address]   [address] [place] [address]   [phone]   Sliding fee scale dental services.  Accepts Medical Assistance.     [facility]   [email or web address]   [address] [place] [address]   [phone] or [phone]   Discounted emergency dental services if paid in full at time of service. Payment plan options available for bills more than $500. Accepts most Medical Assistance, but call and ask before scheduling an appointment.     [facility]   [email or web address]   [address] (Headquarters, but the clinic is mobile), [place] [address]   [phone]   offers dental check-ups, cleanings and simple restorative care to [facility] members who have limited access to quality dental care. All care is provided by faculty-supervised dental, dental hygiene and dental therapy students from the [employer], [facility]'s [facility] Clinic (MDC) partner.     [facility]   [email or web address]   [address] [place] [address]   [phone]   Provides free dental services to all who don't have insurance. Appointments are required. Please call for an appointment. Day and evening appointments available.     [facility]   [email or web address]   [address] [place] [address]   [phone]   Low-cost, flexible, sliding fee payment options for mental and physical health care services, including an urgent care clinic, for low-income, underinsured, or uninsured patients. Dental service provided by mobile dental unit. Accepts private insurance and Medical Assistance and [facility].     [facility]   [email or web address]   [address] [place] [address]   [phone]   [facility] provides quality, low-cost preventive, restorative,  and emergency dental services for insured, uninsured and underinsured patients using a sliding fee scale and accepts Medical Assistance and [place].

## 2024-03-25 ENCOUNTER — PATIENT OUTREACH (OUTPATIENT)
Dept: CARE COORDINATION | Facility: CLINIC | Age: 34
End: 2024-03-25

## 2024-04-02 ENCOUNTER — TELEPHONE (OUTPATIENT)
Dept: FAMILY MEDICINE | Facility: CLINIC | Age: 34
End: 2024-04-02

## 2024-04-02 NOTE — TELEPHONE ENCOUNTER
Patient Quality Outreach    Patient is due for the following:   Diabetes -  A1C, Eye Exam, and Diabetic Follow-Up Visit  Physical Preventive Adult Physical    Next Steps:   Schedule a Adult Preventative    Type of outreach:    Sent letter.      Questions for provider review:               Mónica Westfall, CMA

## 2024-04-25 ENCOUNTER — PATIENT OUTREACH (OUTPATIENT)
Dept: CARE COORDINATION | Facility: CLINIC | Age: 34
End: 2024-04-25

## 2024-04-25 DIAGNOSIS — E11.628 TYPE 2 DIABETES MELLITUS WITH OTHER SKIN COMPLICATION, WITHOUT LONG-TERM CURRENT USE OF INSULIN (H): ICD-10-CM

## 2024-04-25 NOTE — PROGRESS NOTES
Clinic Care Coordination Contact  [facility]/Voicemail    Clinical Data: Care Coordinator Outreach    Outreach Documentation Number of Outreach Attempt   4/25/2024   4:19 PM 1       Left message on patient's voicemail with call back information and requested return call.    Plan: Care Coordinator will try to reach patient again in 10 business days.    Mickie HUSTON [facility]  Community Health Worker  [facility] Clinics:  [place], [place] [facility] Care Coordination  [phone]

## 2024-04-26 RX ORDER — LISINOPRIL 20 MG/1
20 TABLET ORAL DAILY
Qty: 30 TABLET | Refills: 0 | Status: SHIPPED | OUTPATIENT
Start: 2024-04-26 | End: 2024-06-18

## 2024-04-26 NOTE — TELEPHONE ENCOUNTER
Please advise on refill pt failed protocol due to BP    Requested Prescriptions   Pending Prescriptions Disp Refills    lisinopril (ZESTRIL) 20 MG tablet [Pharmacy Med Name: LISINOPRIL 20MG TABLETS] 30 tablet 0     Sig: TAKE 1 TABLET(20 MG) BY MOUTH DAILY       ACE Inhibitors (Including Combos) Protocol Failed - 4/26/2024  6:25 AM        Failed - Blood pressure under 140/90 in past 12 months- Clinicial or Patient Reported     BP Readings from Last 3 Encounters:   03/18/24 (!) 147/92   10/16/23 136/77   08/15/23 (!) 137/91       No data recorded            Passed - Medication is active on med list        Passed - Medication indicated for associated diagnosis     Medication is associated with one or more of the following diagnoses:     Chronic Kidney Disease (CKD)   Coronary Artery Disease (CAD)   Diabetes   Heart Failure (HF)   Hypertension (HTN)   Nephropathy            Passed - Has GFR on file in past 12 months and most recent value is normal        Passed - Recent (12 mo) or future (90 days) visit within the authorizing provider's specialty     The patient must have completed an in-person or virtual visit within the past 12 months or has a future visit scheduled within the next 90 days with the authorizing provider s specialty.  Urgent care and e-visits do not quality as an office visit for this protocol.          Passed - Patient is age 18 or older        Passed - Normal serum creatinine on file in past 12 months     Recent Labs   Lab Test 07/19/23  1010   CR 1.10                 Passed - Normal serum potassium on file in past 12 months     Recent Labs   Lab Test 07/19/23  1010   POTASSIUM 3.5

## 2024-05-01 ENCOUNTER — PATIENT OUTREACH (OUTPATIENT)
Dept: CARE COORDINATION | Facility: CLINIC | Age: 34
End: 2024-05-01

## 2024-05-01 NOTE — PROGRESS NOTES
Clinic Care Coordination Contact  Care Coordination Clinician Chart Review    Situation: Patient chart reviewed by Care Coordinator.       Background: Care Coordination Program started: 3/18/2024. Initial assessment completed and patient-centered care plan(s) were developed with participation from patient. Lead CC handed patient off to CHW for continued outreaches.       Assessment: Per chart review, patient outreach attempted by CC CHW on 4/25/24. Per standard of work protocol, CHW will attempt again in 10 business days. Patient is actively working to accomplish goal(s) per last successful outreach on 3/20/24. Patient's goal(s) appropriate and relevant at this time. Patient is not due for updated Plan of Care.  Assessments will be completed annually or as needed/with change of patient status.      Care Plan: Advance Care Plan       Problem: Patient does not have a valid Health Care Directive       Goal: Complete Health Care Directive       Start Date: 3/20/2024 Expected End Date: 9/18/2024    This Visit's Progress: 0%    Note:     Barriers: Provider availability - wait time to complete appointments.   Strengths: Motivated; Agreeable to Care Coordination.   Patient expressed understanding of goal: Yes.   Action steps to achieve this goal:  1. I will review the resources for Honoring Choices.   2. I will contact [facility] Cho[employer] with any questions or when I am ready to complete my Advance Care Planning documents.   3. I will contact my care team with any questions, concerns or support needs. I will use the clinic as a resource and I understand I can contact my clinic with 24/7 after hours services available. Care Coordinator will remain available as needed.                               Care Plan: Health Maintenance       Problem: Health Maintenance Due or Overdue       Goal: Become up-to-date with health maintenance visit(s)       Start Date: 3/20/2024 Expected End Date: 9/18/2024    This Visit's Progress: 0%     Note:     Barriers: Provider availability - wait time to complete appointments.   Strengths: Motivated; Agreeable to Care Coordination.   Patient expressed understanding of goal: Yes.   Action steps to achieve this goal:  1. I will take my medications as prescribed.    2. I will discuss, review, schedule and complete recommended overdue health maintenance with my Primary Care Provider.   3. I will contact my care team with any questions, concerns or support needs. I will use the clinic as a resource and I understand I can contact my clinic with 24/7 after hours services available. Care Coordinator will remain available as needed.                             Care Plan: Resources for Dental Clinics       Problem: Resources for Dental Clinics       Goal: Dental Clinic Resources       Start Date: 3/20/2024 Expected End Date: 9/18/2024    This Visit's Progress: 0%    Note:     Barriers: Provider availability - wait time to complete appointments.   Strengths: Motivated; Agreeable to Care Coordination.   Patient expressed understanding of goal: Yes.   Action steps to achieve this goal:  1. I will review the resources for Low Cost Dental Clinics.   2. I will contact my Care Coordinator with any questions or if I need additional resources.   3. I will contact my care team with any questions, concerns or support needs. I will use the clinic as a resource and I understand I can contact my clinic with 24/7 after hours services available. Care Coordinator will remain available as needed.                                  Plan/Recommendations: The patient will continue working with Care Coordination to achieve goal(s) as above. CHW will continue outreaches at minimum every 30 days and will involve Lead CC as needed or if patient is ready to move to Maintenance. Lead CC will continue to monitor CHW outreaches and patient's progress to goal(s) every 6 weeks.     Plan of Care updated and sent to patient: Maciel Thomas, RN, BSN,  SHERRY, [facility] Ambulatory Care Management  [facility]  Phone: [phone]  Email: [email or web address]

## 2024-05-23 ENCOUNTER — PATIENT OUTREACH (OUTPATIENT)
Dept: CARE COORDINATION | Facility: CLINIC | Age: 34
End: 2024-05-23

## 2024-05-23 NOTE — PROGRESS NOTES
Clinic Care Coordination Contact  [facility]    Clinical Data: Care Coordinator Outreach    Outreach Documentation Number of Outreach Attempt   4/25/2024   4:19 PM 1   5/23/2024  12:12 PM 2       CHW was able to briefly spoke with the Patient who was at work and requested a call either Wednesday or Friday of next week.    Plan: Care Coordinator will try to reach patient again in 3-5 business days.      ABELARDO [facility] Community Health Worker  Ambulatory Care Coordinator

## 2024-05-26 ENCOUNTER — HEALTH MAINTENANCE LETTER (OUTPATIENT)
Age: 34
End: 2024-05-26

## 2024-05-29 ENCOUNTER — TELEPHONE (OUTPATIENT)
Dept: ENDOCRINOLOGY | Facility: CLINIC | Age: 34
End: 2024-05-29

## 2024-06-12 ENCOUNTER — PATIENT OUTREACH (OUTPATIENT)
Dept: CARE COORDINATION | Facility: CLINIC | Age: 34
End: 2024-06-12

## 2024-06-12 NOTE — PROGRESS NOTES
Clinic Care Coordination Contact  Care Coordination Clinician Chart Review    Situation: Patient chart reviewed by Care Coordinator.       Background: Care Coordination Program started: 3/18/2024. Initial assessment completed and patient-centered care plan(s) were developed with participation from patient. Lead CC handed patient off to CHW for continued outreaches.       Assessment: Per chart review, patient outreach completed by CC CHW on 5/23/24.  Patient is actively working to accomplish goal(s). Patient's goal(s) appropriate and relevant at this time. Patient is due for updated Plan of Care.  Assessments will be completed annually or as needed/with change of patient status.      Care Plan: Advance Care Plan       Problem: Patient does not have a valid Health Care Directive       Goal: Complete Health Care Directive       Start Date: 3/20/2024 Expected End Date: 9/18/2024    This Visit's Progress: 10% Recent Progress: 0%    Note:     Barriers: Provider availability - wait time to complete appointments.   Strengths: Motivated; Agreeable to Care Coordination.   Patient expressed understanding of goal: Yes.   Action steps to achieve this goal:  1. I will review the resources for [facility].   2. I will contact [facility] with any questions or when I am ready to complete my Advance Care Planning documents.   3. I will contact my care team with any questions, concerns or support needs. I will use the clinic as a resource and I understand I can contact my clinic with 24/7 after hours services available. Care Coordinator will remain available as needed.                               Care Plan: Health Maintenance       Problem: Health Maintenance Due or Overdue       Goal: Become up-to-date with health maintenance visit(s)       Start Date: 3/20/2024 Expected End Date: 9/18/2024    This Visit's Progress: 10% Recent Progress: 0%    Note:     Barriers: Provider availability - wait time to complete appointments.    Strengths: Motivated; Agreeable to Care Coordination.   Patient expressed understanding of goal: Yes.   Action steps to achieve this goal:  1. I will take my medications as prescribed.    2. I will discuss, review, schedule and complete recommended overdue health maintenance with my Primary Care Provider.   3. I will contact my care team with any questions, concerns or support needs. I will use the clinic as a resource and I understand I can contact my clinic with 24/7 after hours services available. Care Coordinator will remain available as needed.                             Care Plan: Resources for Dental Clinics       Problem: Resources for Dental Clinics       Goal: Dental Clinic Resources       Start Date: 3/20/2024 Expected End Date: 9/18/2024    This Visit's Progress: 10% Recent Progress: 0%    Note:     Barriers: Provider availability - wait time to complete appointments.   Strengths: Motivated; Agreeable to Care Coordination.   Patient expressed understanding of goal: Yes.   Action steps to achieve this goal:  1. I will review the resources for Low Cost Dental Clinics.   2. I will contact my Care Coordinator with any questions or if I need additional resources.   3. I will contact my care team with any questions, concerns or support needs. I will use the clinic as a resource and I understand I can contact my clinic with 24/7 after hours services available. Care Coordinator will remain available as needed.                                  Plan/Recommendations: The patient will continue working with Care Coordination to achieve goal(s) as above. CHW will continue outreaches at minimum every 30 days and will involve Lead CC as needed or if patient is ready to move to Maintenance. Lead CC will continue to monitor CHW outreaches and patient's progress to goal(s) every 6 weeks.     Plan of Care updated and sent to patient: Yes, via [employer].    Rina Thomas, RN, BSN, CPHN, [facility] Care  Management  [facility]  Phone: [phone]  Email: [email or web address]

## 2024-06-12 NOTE — LETTER
Dear Ubaldo,   Alondra is an updated Patient Centered Plan of Care for your continued enrollment in Care Coordination. Please let us know if you have additional questions, concerns or goals that we can assist with.     Sincerely,   Rina Thomas RN, BSN, CPHN, CM  [facility] Ambulatory Care Management  [facility]  Phone: [phone]  Email: [email or web address]  Patient Centered Plan of Care  About Me:        Patient Name:  Ubaldo Zamora    YOB: 1990  Age:         33 year old   [employer] MRN:    [id number] Telephone Information:  Home Phone [phone]   Mobile [phone]       Address:  [address] [place], [place] [address] Email address:  [email or web address]      Emergency Contact(s)    Name Relationship Lgl Grd Work Phone Home Phone Mobile Phone   1. ANTONIA JIN Significant ot*   [phone]            Primary language:  English     needed? No   [employer]:  [phone] op. 1  Other communication barriers:None    Preferred Method of Communication:  Phone  Current living arrangement: Other (Lives with roommates)    Mobility Status/ Medical Equipment: Independent    Health Maintenance  Health Maintenance Reviewed: Due/Overdue (Discussed with patient.)    My Access Plan  Medical Emergency 911   Primary Clinic Line [facility] - [phone]   24 Hour Appointment Line [phone] or  [address] [place] [address] [phone]) (toll-free)   24 Hour Nurse Line [phone] (toll-free)   Preferred Urgent Care No data recorded   Preferred Hospital [facility]  [phone]     Preferred Pharmacy [id number] - [address] AT [address] [place] [address]     Behavioral Health Crisis Line The National Suicide Prevention Lifeline at 1-138.719.5664 or Text/Call 498     My Care Team Members  Patient Care Team          Relationship Specialty Notifications Start End    CarsonRell michel DO PCP - General Family Medicine  6/1/22     Phone: [phone] Fax: [phone] [address] [facility] [address]    CarsonRell bryant DO Assigned PCP   6/11/22     Phone: [phone] Fax: [phone] [address] [facility] [address]    Pati Payan, [facility] Pharmacist Pharmacist  6/13/22     Phone: [phone] Fax: [phone] [address] [facility] [address]    Yuki Hendrix PA-C Physician Assistant Endocrinology, Diabetes, and Metabolism  7/18/23     Phone: [phone] Fax: [phone] [address] [facility] [address]    Rosa Wilson Diabetes Educator Dietitian, Registered  7/26/23      [address] [place] [address]    Yuki Hendrix PA-C Assigned Endocrinology Provider   2/2/24     Phone: [phone] Fax: [phone]         [address] [facility] [address]    Rina Thomas, MENDEL Lead Care Coordinator Primary Care -  Admissions 3/19/24     Phone: [phone]         Mickie Jacobo Community Health Worker  Admissions 3/20/24     Phone: [phone]         Cassidy Nix, [facility] Health Worker  Admissions 4/27/24     Phone: [phone]                   My Care Plans  Self Management and Treatment Plan    Care Plan  Care Plan: Advance Care Plan       Problem: Patient does not have a valid Health Care Directive       Goal: Complete Health Care Directive       Start Date: 3/20/2024 Expected End Date: 9/18/2024    This Visit's Progress: 10% Recent Progress: 0%    Note:     Barriers: Provider availability - wait time to complete appointments.   Strengths: Motivated; Agreeable to Care Coordination.   Patient expressed understanding of goal: Yes.   Action steps to achieve this goal:  1. I will review the resources for [facility] Cho[facility].   2. I will contact [facility] with any questions or when I am ready to complete my Advance Care Planning documents.   3. I  will contact my care team with any questions, concerns or support needs. I will use the clinic as a resource and I understand I can contact my clinic with 24/7 after hours services available. Care Coordinator will remain available as needed.                               Care Plan: Health Maintenance       Problem: Health Maintenance Due or Overdue       Goal: Become up-to-date with health maintenance visit(s)       Start Date: 3/20/2024 Expected End Date: 9/18/2024    This Visit's Progress: 10% Recent Progress: 0%    Note:     Barriers: Provider availability - wait time to complete appointments.   Strengths: Motivated; Agreeable to Care Coordination.   Patient expressed understanding of goal: Yes.   Action steps to achieve this goal:  1. I will take my medications as prescribed.    2. I will discuss, review, schedule and complete recommended overdue health maintenance with my Primary Care Provider.   3. I will contact my care team with any questions, concerns or support needs. I will use the clinic as a resource and I understand I can contact my clinic with 24/7 after hours services available. Care Coordinator will remain available as needed.                             Care Plan: Resources for Dental Clinics       Problem: Resources for Dental Clinics       Goal: Dental Clinic Resources       Start Date: 3/20/2024 Expected End Date: 9/18/2024    This Visit's Progress: 10% Recent Progress: 0%    Note:     Barriers: Provider availability - wait time to complete appointments.   Strengths: Motivated; Agreeable to Care Coordination.   Patient expressed understanding of goal: Yes.   Action steps to achieve this goal:  1. I will review the resources for Low Cost Dental Clinics.   2. I will contact my Care Coordinator with any questions or if I need additional resources.   3. I will contact my care team with any questions, concerns or support needs. I will use the clinic as a resource and I understand I can contact my  clinic with 24/7 after hours services available. Care Coordinator will remain available as needed.                             Action Plans on File:     Advance Care Plans/Directives:   Advanced Care Plan/Directives on file:   No    Discussed with patient/caregiver(s):   Referral to Patria Corley         My Medical and Care Information  Problem List   Patient Active Problem List   Diagnosis    DKA (diabetic ketoacidoses)    Acute pancreatitis    Cellulitis of perineum    Hyperglycemia    Poorly controlled diabetes mellitus (H)    Calculus of gallbladder without cholecystitis without obstruction      Current Medications and Allergies:    Current Outpatient Medications   Medication Sig Dispense Refill    alcohol swab prep pads Use to swab area of injection/kelechi as directed. 100 each 3    BD PEN NEEDLE JORDYN 2ND GEN 32G X 4 MM miscellaneous USE WITH INSULIN PENS      blood glucose (NO BRAND SPECIFIED) test strip Use to test blood sugar daily or as directed. To accompany: Blood Glucose Monitor Brands: per insurance. 300 strip 11    blood glucose calibration (NO BRAND SPECIFIED) solution To accompany: Blood Glucose Monitor Brands: per insurance. 1 each 3    blood glucose monitoring (NO BRAND SPECIFIED) meter device kit Use to test blood sugar as directed. 1 kit 0    clotrimazole (LOTRIMIN) 1 % external cream Apply topically 2 times daily 60 g 1    Continuous Blood Gluc  (FREESTYLE GEOVANY 2 READER) EDDIE Use to read blood sugars as per 's instructions. 1 each 0    Continuous Blood Gluc Sensor (FREESTYLE GEOVANY 2 SENSOR) MISC Change every 14 days. 6 each 0    Glucagon (GVOKE HYPOPEN) 1 MG/0.2ML pen Inject the contents of 1 device under the skin into lower abdomen, outer thigh, or outer upper arm as needed for hypoglycemia. If no response after 15 minutes, additional 1 mg dose from a new device may be injected while waiting for emergency assistance. 0.4 mL 1    insulin aspart (NOVOLOG FLEXPEN) 100  UNIT/ML pen 1 units per 15 gram carb unit before breakfast, lunch and dinner 15 mL 1    insulin glargine (BASAGLAR KWIKPEN) 100 UNIT/ML pen Inject 27 Units Subcutaneous daily 15 mL 3    insulin lispro (HUMALOG KWIKPEN) 100 UNIT/ML (1 unit dial) KWIKPEN 1 units per 15 gram carb unit before breakfast, lunch and dinner up to 20 units daily 15 mL 0    insulin pen needle (32G X 6 MM) 32G X 6 MM miscellaneous Use 4 pen needles daily or as directed. 200 each 0    lisinopril (ZESTRIL) 20 MG tablet TAKE 1 TABLET(20 MG) BY MOUTH DAILY 30 tablet 0    Sharps Container MISC 1 Units See Admin Instructions .  Use sharps container as instructed to dispose of needles and lancets. 1 each 11    thin (NO BRAND SPECIFIED) lancets Use with lanceting device. To accompany: Blood Glucose Monitor Brands: per insurance. 100 each 6     No current facility-administered medications for this visit.      No Known Allergies    Care Coordination Start Date: 3/18/2024   Frequency of Care Coordination: monthly, more frequently as needed     Form Last Updated: 06/12/2024

## 2024-06-18 ENCOUNTER — OFFICE VISIT (OUTPATIENT)
Dept: ENDOCRINOLOGY | Facility: CLINIC | Age: 34
End: 2024-06-18

## 2024-06-18 VITALS
WEIGHT: 185.2 LBS | RESPIRATION RATE: 18 BRPM | BODY MASS INDEX: 30.86 KG/M2 | TEMPERATURE: 97.8 F | HEIGHT: 65 IN | HEART RATE: 77 BPM | DIASTOLIC BLOOD PRESSURE: 94 MMHG | SYSTOLIC BLOOD PRESSURE: 151 MMHG | OXYGEN SATURATION: 99 %

## 2024-06-18 DIAGNOSIS — E11.628 TYPE 2 DIABETES MELLITUS WITH OTHER SKIN COMPLICATION, WITHOUT LONG-TERM CURRENT USE OF INSULIN (H): ICD-10-CM

## 2024-06-18 PROCEDURE — 99213 OFFICE O/P EST LOW 20 MIN: CPT | Performed by: PHYSICIAN ASSISTANT

## 2024-06-18 PROCEDURE — 99207 PR FOOT EXAM NO CHARGE: CPT | Performed by: PHYSICIAN ASSISTANT

## 2024-06-18 RX ORDER — INSULIN GLARGINE 100 [IU]/ML
27 INJECTION, SOLUTION SUBCUTANEOUS DAILY
Qty: 15 ML | Refills: 3 | Status: SHIPPED | OUTPATIENT
Start: 2024-06-18

## 2024-06-18 RX ORDER — LISINOPRIL 20 MG/1
20 TABLET ORAL DAILY
Qty: 30 TABLET | Refills: 0 | Status: SHIPPED | OUTPATIENT
Start: 2024-06-18

## 2024-06-18 NOTE — PROGRESS NOTES
Assessment/Plan :   Type 2 DM. Ubaldo is frustrated with his current blood sugars. He knows that he is not taking enough insulin glargine but he is worried about low blood sugars. We reviewed his recent CGMS report and he does have a few days with recurrent low blood sugars. I do not think 24 unit(s) of insulin glargine is enough long acting insulin and I would like him to try 26 unit(s) daily. We also reviewed that he needs to check a fingerstick glucose before drinking soda to correct for a blood sugar of 68 mg/dl. We also reviewed the 15 rule in the treatment of hypoglycemia. I encouraged him to continue to work on managing his blood sugars and we will follow-up in 6 mos.      I have independently reviewed and interpreted labs, imaging as indicated.      Chief complaint:  Ubaldo is a 33 year old male who returns for follow-up of Type 2 DM.    I have reviewed Care Everywhere including [facility], [facility], [facility], [facility], [facility] , [facility], [facility] lab reports, imaging reports and provider notes as indicated.      HISTORY OF PRESENT ILLNESS  Ubaldo has been struggling with blood sugar control. He really does not like it when his blood sugars are low, so he has not been taking 27 unit(s) of insulin glargine, consistently. If he has a day when his numbers are lower, he will cut back to 24 unit(s) of insulin glargine, which leads to a spike in his blood sugars. He has also continued to take insulin aspart before meals based on an insulin to carb ratio of 1 unit(s) for every 15 grams of carbs. However, he often skips breakfast and doesn't eat lunch until late.     Ubaldo uses the FreeStyle Laura to monitor his blood sugars daily. He has not had any problems with severe hyperglycemia and/or hypoglycemia. He has had a few episodes where the sensor alerted for a low blood sugar but he was not actually low. He will typically drink a soda when this occurs, which occasionally leads to  hyperglycemia. He knows that he should check a fingerstick reading before correcting but sometimes he panics. He has not had any problems with blurred vision or an increase in numbness/tingling in his feet. He has had ongoing dental pain.    Connor was diagnosed with diabetes at the age of 18. He was started on metformin at the time of diagnosis. This worked well for a few years but over time his blood sugars started to increase and he was started on insulin. He has struggled with taking insulin over the years. Partly due to cost. He would like to look into options regarding insulin pump therapy but he cannot afford an insulin pump at present time. His diabetes is complicated by hyperlipidemia, HTN, DPN, and a history of acute pancreatitis and cholecystitis.    Endocrine relevant labs are as follows:   Latest Reference Range & Units 10/16/23 15:24   Hemoglobin A1C 0.0 - 5.6 % 6.3 (H)   (H): Data is abnormally high   Latest Reference Range & Units 07/15/23 06:51   Albumin Urine mg/g Cr 0.00 - 17.00 mg/g Cr 726.32 (H)   (H): Data is abnormally high   Latest Reference Range & Units 07/15/23 06:51   Albumin Urine mg/L mg/L 276.0     REVIEW OF SYSTEMS    Endocrine: positive for diabetes  Skin: negative  Eyes: negative for, visual blurring, redness, tearing  Ears/Nose/Throat: negative  Respiratory: No shortness of breath, dyspnea on exertion, cough, or hemoptysis  Cardiovascular: negative for, chest pain, dyspnea on exertion, lower extremity edema, and exercise intolerance  Gastrointestinal: negative for, nausea, vomiting, constipation, and diarrhea  Genitourinary: negative for, nocturia, dysuria, frequency, and urgency  Musculoskeletal: negative for, muscular weakness, nocturnal cramping, and foot pain  Neurologic: negative for, local weakness, numbness or tingling of hands, and numbness or tingling of feet  Psychiatric: negative  Hematologic/Lymphatic/Immunologic: negative    Past Medical History  Past Medical History:    Diagnosis Date    Diabetes (H)     type 2 diabetes    Diabetes mellitus type 2 with complications (H)     history of DKA, recurring infections       Medications  Current Outpatient Medications   Medication Sig Dispense Refill    alcohol swab prep pads Use to swab area of injection/kelechi as directed. 100 each 3    BD PEN NEEDLE JORDYN 2ND GEN 32G X 4 MM miscellaneous USE WITH INSULIN PENS      blood glucose (NO BRAND SPECIFIED) test strip Use to test blood sugar daily or as directed. To accompany: Blood Glucose Monitor Brands: per insurance. 300 strip 11    blood glucose calibration (NO BRAND SPECIFIED) solution To accompany: Blood Glucose Monitor Brands: per insurance. 1 each 3    blood glucose monitoring (NO BRAND SPECIFIED) meter device kit Use to test blood sugar as directed. 1 kit 0    clotrimazole (LOTRIMIN) 1 % external cream Apply topically 2 times daily 60 g 1    Continuous Blood Gluc  (FREESTYLE GEOVANY 2 READER) EDDIE Use to read blood sugars as per 's instructions. 1 each 0    Continuous Blood Gluc Sensor (FREESTYLE GEOVANY 2 SENSOR) MISC Change every 14 days. 6 each 0    Glucagon (GVOKE HYPOPEN) 1 MG/0.2ML pen Inject the contents of 1 device under the skin into lower abdomen, outer thigh, or outer upper arm as needed for hypoglycemia. If no response after 15 minutes, additional 1 mg dose from a new device may be injected while waiting for emergency assistance. 0.4 mL 1    insulin aspart (NOVOLOG FLEXPEN) 100 UNIT/ML pen 1 units per 15 gram carb unit before breakfast, lunch and dinner 15 mL 1    insulin glargine (BASAGLAR KWIKPEN) 100 UNIT/ML pen Inject 27 Units Subcutaneous daily 15 mL 3    insulin lispro (HUMALOG KWIKPEN) 100 UNIT/ML (1 unit dial) KWIKPEN 1 units per 15 gram carb unit before breakfast, lunch and dinner up to 20 units daily 15 mL 0    insulin pen needle (32G X 6 MM) 32G X 6 MM miscellaneous Use 4 pen needles daily or as directed. 200 each 0    lisinopril (ZESTRIL) 20 MG  "tablet TAKE 1 TABLET(20 MG) BY MOUTH DAILY 30 tablet 0    Sharps Container MISC 1 Units See Admin Instructions .  Use sharps container as instructed to dispose of needles and lancets. 1 each 11    thin (NO BRAND SPECIFIED) lancets Use with lanceting device. To accompany: Blood Glucose Monitor Brands: per insurance. 100 each 6       Allergies  No Known Allergies      Family History  family history includes Alcoholism in his father; Asthma in his father; Colon Cancer in his maternal grandmother; Coronary Artery Disease in his maternal grandfather, mother, and paternal grandmother; Depression in his father, maternal grandfather, maternal grandmother, and mother; Diabetes in his maternal grandfather, maternal grandmother, mother, paternal grandfather, and paternal grandmother; Heart Disease in his mother; Hyperlipidemia in his father, maternal grandfather, maternal grandmother, and paternal grandmother; Hypertension in his father and mother; Lung Cancer in his father; Mental Illness in his father; Obesity in his mother; Stomach Problem in his mother.    Social History  Social History     Tobacco Use    Smoking status: Every Day     Current packs/day: 1.00     Types: Cigarettes    Smokeless tobacco: Never   Vaping Use    Vaping status: Former   Substance Use Topics    Alcohol use: No     Alcohol/week: 0.0 standard drinks of alcohol    Drug use: Not Currently     Types: Marijuana       Physical Exam  BP (!) 159/95 (BP Location: Left arm, Patient Position: Chair, Cuff Size: Adult Regular)   Pulse 77   Temp 97.8  F (36.6  C) (Tympanic)   Resp 18   Ht 1.651 m (5' 5\")   Wt 84 kg (185 lb 3.2 oz)   SpO2 99%   BMI 30.82 kg/m    Body mass index is 30.82 kg/m .  GENERAL :  In no apparent distress. He is accompanied by his significant other  SKIN: Normal color, normal temperature, texture.  No hirsutism, alopecia or purple striae.     EYES: PERRL, EOMI, No scleral icterus,  No proptosis, conjunctival redness, stare, " retraction  RESP: Lungs clear to auscultation bilaterally  CARDIAC: Regular rate and rhythm, normal S1 S2, without murmurs, rubs or gallops   NEURO: awake, alert, responds appropriately to questions.  Cranial nerves intact.   Moves all extremities; Gait normal.  No tremor of the outstretched hand.    EXTREMITIES: No clubbing, cyanosis or edema.    DATA REVIEW  FreeStyle LibreView  Time in target range 36%  High 45% and Very High 19%  [address] Ave  mg/dl

## 2024-06-18 NOTE — PATIENT INSTRUCTIONS
[facility]  Dr Garcia, Endocrinology Department    [address] [id number]  [place] [address]  Appointment [phone]  Fax: [phone]  [place]: Monday - Thursday

## 2024-06-18 NOTE — LETTER
6/18/2024      Ubaldo Zamora  [address] [facility] [address]      Dear Colleague,    Thank you for referring your patient, Ubaldo Zamora, to the [facility]. Please see a copy of my visit note below.    Assessment/Plan :   Type 2 DM. Ubaldo is frustrated with his current blood sugars. He knows that he is not taking enough insulin glargine but he is worried about low blood sugars. We reviewed his recent CGMS report and he does have a few days with recurrent low blood sugars. I do not think 24 unit(s) of insulin glargine is enough long acting insulin and I would like him to try 26 unit(s) daily. We also reviewed that he needs to check a fingerstick glucose before drinking soda to correct for a blood sugar of 68 mg/dl. We also reviewed the 15 rule in the treatment of hypoglycemia. I encouraged him to continue to work on managing his blood sugars and we will follow-up in 6 mos.      I have independently reviewed and interpreted labs, imaging as indicated.      Chief complaint:  Ubaldo is a 33 year old male who returns for follow-up of Type 2 DM.    I have reviewed Care Everywhere including [facility], [facility], [place], [facility] , [facility] lab reports, imaging reports and provider notes as indicated.      HISTORY OF PRESENT ILLNESS  Ubaldo has been struggling with blood sugar control. He really does not like it when his blood sugars are low, so he has not been taking 27 unit(s) of insulin glargine, consistently. If he has a day when his numbers are lower, he will cut back to 24 unit(s) of insulin glargine, which leads to a spike in his blood sugars. He has also continued to take insulin aspart before meals based on an insulin to carb ratio of 1 unit(s) for every 15 grams of carbs. However, he often skips breakfast and doesn't eat lunch until late.     Ubaldo uses the FreeStyle Laura to monitor his blood sugars daily. He has not had  any problems with severe hyperglycemia and/or hypoglycemia. He has had a few episodes where the sensor alerted for a low blood sugar but he was not actually low. He will typically drink a soda when this occurs, which occasionally leads to hyperglycemia. He knows that he should check a fingerstick reading before correcting but sometimes he panics. He has not had any problems with blurred vision or an increase in numbness/tingling in his feet. He has had ongoing dental pain.    Connor was diagnosed with diabetes at the age of 18. He was started on metformin at the time of diagnosis. This worked well for a few years but over time his blood sugars started to increase and he was started on insulin. He has struggled with taking insulin over the years. Partly due to cost. He would like to look into options regarding insulin pump therapy but he cannot afford an insulin pump at present time. His diabetes is complicated by hyperlipidemia, HTN, DPN, and a history of acute pancreatitis and cholecystitis.    Endocrine relevant labs are as follows:   Latest Reference Range & Units 10/16/23 15:24   Hemoglobin A1C 0.0 - 5.6 % 6.3 (H)   (H): Data is abnormally high   Latest Reference Range & Units 07/15/23 06:51   Albumin Urine mg/g Cr 0.00 - 17.00 mg/g Cr 726.32 (H)   (H): Data is abnormally high   Latest Reference Range & Units 07/15/23 06:51   Albumin Urine mg/L mg/L 276.0     REVIEW OF SYSTEMS    Endocrine: positive for diabetes  Skin: negative  Eyes: negative for, visual blurring, redness, tearing  Ears/Nose/Throat: negative  Respiratory: No shortness of breath, dyspnea on exertion, cough, or hemoptysis  Cardiovascular: negative for, chest pain, dyspnea on exertion, lower extremity edema, and exercise intolerance  Gastrointestinal: negative for, nausea, vomiting, constipation, and diarrhea  Genitourinary: negative for, nocturia, dysuria, frequency, and urgency  Musculoskeletal: negative for, muscular weakness, nocturnal cramping,  and foot pain  Neurologic: negative for, local weakness, numbness or tingling of hands, and numbness or tingling of feet  Psychiatric: negative  Hematologic/Lymphatic/Immunologic: negative    Past Medical History  Past Medical History:   Diagnosis Date     Diabetes (H)     type 2 diabetes     Diabetes mellitus type 2 with complications (H)     history of DKA, recurring infections       Medications  Current Outpatient Medications   Medication Sig Dispense Refill     alcohol swab prep pads Use to swab area of injection/kelechi as directed. 100 each 3     BD PEN NEEDLE JORDYN 2ND GEN 32G X 4 MM miscellaneous USE WITH INSULIN PENS       blood glucose (NO BRAND SPECIFIED) test strip Use to test blood sugar daily or as directed. To accompany: Blood Glucose Monitor Brands: per insurance. 300 strip 11     blood glucose calibration (NO BRAND SPECIFIED) solution To accompany: Blood Glucose Monitor Brands: per insurance. 1 each 3     blood glucose monitoring (NO BRAND SPECIFIED) meter device kit Use to test blood sugar as directed. 1 kit 0     clotrimazole (LOTRIMIN) 1 % external cream Apply topically 2 times daily 60 g 1     Continuous Blood Gluc  (FREESTYLE GEOVANY 2 READER) EDDIE Use to read blood sugars as per 's instructions. 1 each 0     Continuous Blood Gluc Sensor (FREESTYLE GEOVANY 2 SENSOR) MISC Change every 14 days. 6 each 0     Glucagon (GVOKE HYPOPEN) 1 MG/0.2ML pen Inject the contents of 1 device under the skin into lower abdomen, outer thigh, or outer upper arm as needed for hypoglycemia. If no response after 15 minutes, additional 1 mg dose from a new device may be injected while waiting for emergency assistance. 0.4 mL 1     insulin aspart (NOVOLOG FLEXPEN) 100 UNIT/ML pen 1 units per 15 gram carb unit before breakfast, lunch and dinner 15 mL 1     insulin glargine (BASAGLAR KWIKPEN) 100 UNIT/ML pen Inject 27 Units Subcutaneous daily 15 mL 3     insulin lispro (HUMALOG KWIKPEN) 100 UNIT/ML (1 unit  "dial) KWIKPEN 1 units per 15 gram carb unit before breakfast, lunch and dinner up to 20 units daily 15 mL 0     insulin pen needle (32G X 6 MM) 32G X 6 MM miscellaneous Use 4 pen needles daily or as directed. 200 each 0     lisinopril (ZESTRIL) 20 MG tablet TAKE 1 TABLET(20 MG) BY MOUTH DAILY 30 tablet 0     Sharps Container MISC 1 Units See Admin Instructions .  Use sharps container as instructed to dispose of needles and lancets. 1 each 11     thin (NO BRAND SPECIFIED) lancets Use with lanceting device. To accompany: Blood Glucose Monitor Brands: per insurance. 100 each 6       Allergies  No Known Allergies      Family History  family history includes Alcoholism in his father; Asthma in his father; Colon Cancer in his maternal grandmother; Coronary Artery Disease in his maternal grandfather, mother, and paternal grandmother; Depression in his father, maternal grandfather, maternal grandmother, and mother; Diabetes in his maternal grandfather, maternal grandmother, mother, paternal grandfather, and paternal grandmother; Heart Disease in his mother; Hyperlipidemia in his father, maternal grandfather, maternal grandmother, and paternal grandmother; Hypertension in his father and mother; Lung Cancer in his father; Mental Illness in his father; Obesity in his mother; Stomach Problem in his mother.    Social History  Social History     Tobacco Use     Smoking status: Every Day     Current packs/day: 1.00     Types: Cigarettes     Smokeless tobacco: Never   Vaping Use     Vaping status: Former   Substance Use Topics     Alcohol use: No     Alcohol/week: 0.0 standard drinks of alcohol     Drug use: Not Currently     Types: Marijuana       Physical Exam  BP (!) 159/95 (BP Location: Left arm, Patient Position: Chair, Cuff Size: Adult Regular)   Pulse 77   Temp 97.8  F (36.6  C) (Tympanic)   Resp 18   Ht 1.651 m (5' 5\")   Wt 84 kg (185 lb 3.2 oz)   SpO2 99%   BMI 30.82 kg/m    Body mass index is 30.82 kg/m .  GENERAL " :  In no apparent distress. He is accompanied by his significant other  SKIN: Normal color, normal temperature, texture.  No hirsutism, alopecia or purple striae.     EYES: PERRL, EOMI, No scleral icterus,  No proptosis, conjunctival redness, stare, retraction  RESP: Lungs clear to auscultation bilaterally  CARDIAC: Regular rate and rhythm, normal S1 S2, without murmurs, rubs or gallops   NEURO: awake, alert, responds appropriately to questions.  Cranial nerves intact.   Moves all extremities; Gait normal.  No tremor of the outstretched hand.    EXTREMITIES: No clubbing, cyanosis or edema.    DATA REVIEW  FreeStyle LibreView  Time in target range 36%  High 45% and Very High 19%  Current Ave  mg/dl        Again, thank you for allowing me to participate in the care of your patient.        Sincerely,        Yuki Hendrix PA-C

## 2024-07-18 NOTE — PROGRESS NOTES
".Urology  Progress Note    Admitted and remains on vanc/zosyn/clinda  Reports he is feeling slightly better today  Post void bladder scan 150  Remains slightly tachycardic    Exam  BP (!) 154/90 (BP Location: Left arm)   Pulse 108   Temp (!) 100.9  F (38.3  C) (Temporal)   Resp 18   Ht 1.651 m (5' 5\")   Wt 76.9 kg (169 lb 8.5 oz)   SpO2 94%   BMI 28.21 kg/m    No acute distress  Unlabored breathing  Abdomen soft  Scrotum with diffuse erythema/induration, marked  Quarter size ecchymosis to inferior left scrotum also marked  Draining aspect of wound (left perineum) improved today - less tender, red, and indurated; minimal drainage    Labs  WBC 20 (30)    Blood culture 7\15 NGTD    Wound culture pending    Assessment/Plan  32 year old y/o male admitted 7/15 with perineal cellulitis in setting of poorly controlled diabetes. Clinically improving on antibiotics. Exam and imaging do not reveal a \"drainable\" collection. Discussed continued conservative management with antibiotics, observation. If any clinical change or failure to improve, would repeat CT scan to evaluate for gas or abscess.    - ok for diet  - continue antibiotics, follow cultures    Seen and examined with Dr. Ayala.    Niki Tuttle MD, PGY-5  Urology Resident        " "Subjective   HPI    Leonel is a 9 y.o. 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    Concerns today: no    General Health: Child is overall in good health.     Social and Family History: There are no interval changes in child's social and family history. Appropriate parent-child interactions were observed.     Nutrition: Leonel eats a variety of foods including dairy products, fruits, vegetables, meats, and grains/cereals.  Elimination  - patterns appropriate: Yes  Dry at night? yes    Sleep:  Sleep patterns appropriate? yes    Behavior: Behavior is appropriate for age.  Peer relationships are appropriate.     Leonel is in a stimulating environment and has limited media exposure.    School:   stGstrstastdstest:st st1st School:[employer] because [employer] is closing  Accommodations: no  Performance: performing at grade level  Behavior: Leonel is well adjusted to school and has no behavior issues.    Can ride bike without training wheels?  yes    Can swim?  yes  Can tie shoes?  yes    Activities: Leonel is involved in hobbies and activities apart from school such as playing outside and bike riding    Sports:  participates in sports?  yes (soccer)    Dental Care:  regular dental visits? yes  water is fluoridated? yes    Safety topics reviewed:  Leonel uses a booster seat. The hot water temperature is set to less than 120 F. There are smoke detectors in the home. Carbon monoxide detectors are used in the home. The parents have the poison control number.   Leonel does own a bicycle helmet and uses it appropriately.      Review of Systems    Objective     /60   Pulse 96   Ht 1.27 m (4' 2\")   Wt 25.9 kg   BMI 16.03 kg/m²     Physical Exam  Vitals and nursing note reviewed. Exam conducted with a chaperone present.   Constitutional:       General: He is active.   HENT:      Head: Normocephalic and atraumatic.      Right Ear: Tympanic membrane, ear canal and " external ear normal.      Left Ear: Tympanic membrane, ear canal and external ear normal.      Nose: Nose normal.      Mouth/Throat:      Mouth: Mucous membranes are moist.   Eyes:      Extraocular Movements: Extraocular movements intact.      Conjunctiva/sclera: Conjunctivae normal.      Pupils: Pupils are equal, round, and reactive to light.   Cardiovascular:      Rate and Rhythm: Normal rate and regular rhythm.      Pulses: Normal pulses.      Heart sounds: Normal heart sounds. No murmur heard.  Pulmonary:      Effort: Pulmonary effort is normal.      Breath sounds: Normal breath sounds.   Abdominal:      General: Abdomen is flat. Bowel sounds are normal.      Palpations: Abdomen is soft. There is no mass.   Genitourinary:     Penis: Normal.       Testes: Normal.   Musculoskeletal:         General: Normal range of motion.      Cervical back: Normal range of motion and neck supple.   Lymphadenopathy:      Cervical: No cervical adenopathy.   Skin:     General: Skin is warm.      Comments: Small plantar wart right foot   Neurological:      General: No focal deficit present.      Mental Status: He is alert and oriented for age.      Cranial Nerves: No cranial nerve deficit.   Psychiatric:         Mood and Affect: Mood normal.         Behavior: Behavior normal.         Assessment/Plan   Problem List Items Addressed This Visit       Multiple nevi    Encounter for routine child health examination without abnormal findings - Primary    Relevant Orders    Follow Up In Pediatrics - Health Maintenance    BMI (body mass index), pediatric, 5% to less than 85% for age    Plantar wart

## 2024-07-29 ENCOUNTER — PATIENT OUTREACH (OUTPATIENT)
Dept: CARE COORDINATION | Facility: CLINIC | Age: 34
End: 2024-07-29

## 2024-07-29 NOTE — PROGRESS NOTES
Clinic Care Coordination Contact  Care Coordination Clinician Chart Review    Situation: Patient chart reviewed by Care Coordinator.       Background: Care Coordination Program started: 3/18/2024. Initial assessment completed and patient-centered care plan(s) were developed with participation from patient. Lead CC handed patient off to CHW for continued outreaches.       Assessment: Per chart review, patient outreach completed by CC CHW on 5/23/24.  Patient is actively working to accomplish goal(s). Patient's goal(s) appropriate and relevant at this time. Patient is not due for updated Plan of Care.  Assessments will be completed annually or as needed/with change of patient status.      Care Plan: Advance Care Plan       Problem: Patient does not have a valid Health Care Directive       Goal: Complete Health Care Directive       Start Date: 3/20/2024 Expected End Date: 9/18/2024    This Visit's Progress: 10% Recent Progress: 0%    Note:     Barriers: Provider availability - wait time to complete appointments.   Strengths: Motivated; Agreeable to Care Coordination.   Patient expressed understanding of goal: Yes.   Action steps to achieve this goal:  1. I will review the resources for [facility].   2. I will contact [facility] with any questions or when I am ready to complete my Advance Care Planning documents.   3. I will contact my care team with any questions, concerns or support needs. I will use the clinic as a resource and I understand I can contact my clinic with 24/7 after hours services available. Care Coordinator will remain available as needed.                               Care Plan: Health Maintenance       Problem: Health Maintenance Due or Overdue       Goal: Become up-to-date with health maintenance visit(s)       Start Date: 3/20/2024 Expected End Date: 9/18/2024    This Visit's Progress: 10% Recent Progress: 0%    Note:     Barriers: Provider availability - wait time to complete  appointments.   Strengths: Motivated; Agreeable to Care Coordination.   Patient expressed understanding of goal: Yes.   Action steps to achieve this goal:  1. I will take my medications as prescribed.    2. I will discuss, review, schedule and complete recommended overdue health maintenance with my Primary Care Provider.   3. I will contact my care team with any questions, concerns or support needs. I will use the clinic as a resource and I understand I can contact my clinic with 24/7 after hours services available. Care Coordinator will remain available as needed.                             Care Plan: Resources for Dental Clinics       Problem: Resources for Dental Clinics       Goal: Dental Clinic Resources       Start Date: 3/20/2024 Expected End Date: 9/18/2024    This Visit's Progress: 10% Recent Progress: 0%    Note:     Barriers: Provider availability - wait time to complete appointments.   Strengths: Motivated; Agreeable to Care Coordination.   Patient expressed understanding of goal: Yes.   Action steps to achieve this goal:  1. I will review the resources for Low Cost Dental Clinics.   2. I will contact my Care Coordinator with any questions or if I need additional resources.   3. I will contact my care team with any questions, concerns or support needs. I will use the clinic as a resource and I understand I can contact my clinic with 24/7 after hours services available. Care Coordinator will remain available as needed.                                Plan/Recommendations: The patient will continue working with Care Coordination to achieve goal(s) as above. CHW will continue outreaches at minimum every 30 days and will involve Lead CC as needed or if patient is ready to move to Maintenance. Lead CC will continue to monitor CHW outreaches and patient's progress to goal(s) every 6 weeks.     Plan of Care updated and sent to patient: No.    Rina Thomas, RN, BSN, CPHN, [facility]  Management  [facility] [place]  Phone: [phone]  Email: [email or web address]

## 2024-07-31 ENCOUNTER — PATIENT OUTREACH (OUTPATIENT)
Dept: CARE COORDINATION | Facility: CLINIC | Age: 34
End: 2024-07-31

## 2024-07-31 NOTE — PROGRESS NOTES
Clinic Care Coordination Contact  [facility]/Voicemail    Clinical Data: Care Coordinator Outreach    Outreach Documentation Number of Outreach Attempt   7/31/2024  11:17 AM 1   8/26/2024  12:47 PM 2     Left message on patient's voicemail with call back information and requested return call.    Plan: Care Coordinator will send unable to contact letter with care coordinator contact information via [employer]. Care Coordinator will try to reach patient again in 10 business days.    Rina Thomas RN, BSN, CPHN, CCM  [facility] Ambulatory Care Management  [facility]  Phone: [phone]  Email: [email or web address]    Clinic Care Coordination Contact  [facility]/Voicemail    Clinical Data: Care Coordinator Outreach    Outreach Documentation Number of Outreach Attempt   7/31/2024  11:17 AM 1     Left message on patient's voicemail with call back information and requested return call. CHW also sent a message to patient via [employer].     Plan: CHW/Care Coordinator will try to reach patient again in 10 business days.    Vivienne Bae  Community Health Worker   [email or web address]   Clinic Care Coordination / Ambulatory Care Management  [facility], [facility]  [email or web address]  Office: [phone]  Gender Pronouns: She/her/hers  Employed by [employer]

## 2024-07-31 NOTE — LETTER
[facility] CARE COORDINATION  [address]  [facility] [address]    August 26, 2024    Ubaldo Zamora  [address]  [facility] [place] [address]      Dear Ubaldo,    I have been attempting to reach you since our last contact. I would like to continue to work with you and provide any additional support you may need on achieving your health care related goals. I would appreciate if you would give me a call at [phone] to let me know if you would like to continue working together. I know that there are many things that can affect our ability to communicate and I hope we can continue to work together.    All of us at the [facility] are invested in your health and are here to assist you in meeting your goals.     Sincerely,    Rina Thomas RN, BSN, CPHN, [facility] Ambulatory Care Management    Phone: [phone]  Email: [email or web address]

## 2024-08-19 DIAGNOSIS — E11.628 TYPE 2 DIABETES MELLITUS WITH OTHER SKIN COMPLICATION, WITHOUT LONG-TERM CURRENT USE OF INSULIN (H): ICD-10-CM

## 2024-08-19 NOTE — TELEPHONE ENCOUNTER
Requested Prescriptions   Pending Prescriptions Disp Refills    Continuous Glucose Sensor (FREESTYLE GEOVANY 2 SENSOR) MISC [Pharmacy Med Name: FREESTYLE GEOVANY 2 SENSOR]       Sig: CHANGE EVERY 14 DAYS       There is no refill protocol information for this order

## 2024-09-11 ENCOUNTER — PATIENT OUTREACH (OUTPATIENT)
Dept: CARE COORDINATION | Facility: CLINIC | Age: 34
End: 2024-09-11

## 2024-09-11 NOTE — Clinical Note
Dr. Byrd, Care Coordination has made 4 attempts to reach patient and sent him an Unable to Contact letter without response. Per standard of work protocol, we have closed the Care Coordination program. If any new needs arise, please enter a new Care Coordination referral. Thank you.  Rina Thomas RN, BSN, CPHN, [facility] Ambulatory Care Management [facility] Phone: [phone] Email: [email or web address]

## 2024-09-11 NOTE — PROGRESS NOTES
Clinic Care Coordination Contact  [facility]/Voicemail    Clinical Data: Care Coordinator Outreach    Outreach Documentation Number of Outreach Attempt   7/31/2024  11:17 AM 1   8/26/2024  12:47 PM 2     2 previous attempts were made by the original CHW on 4/25/24 and 5/23/24 without success.     Left messages on patient's voicemail with call back information and requested return call.    Plan: Care Coordinator sent unable to contact letter with care coordinator contact information via [employer] on 8/26/24 with no response. Care Coordination program will be closed. Care Coordinator will do no further outreaches at this time. Update to Primary Care Provider.    Rina Thomas, RN, BSN, CPHN, CCM  [facility] Ambulatory Care Management  [facility]  Phone: [phone]  Email: [email or web address]

## 2024-09-13 ENCOUNTER — TELEPHONE (OUTPATIENT)
Dept: FAMILY MEDICINE | Facility: CLINIC | Age: 34
End: 2024-09-13

## 2024-09-13 NOTE — TELEPHONE ENCOUNTER
Summary:    Patient is due/failing the following:   Eye exam    Reviewed:    [] CARE EVERYWHERE  [] LAST OV NOTE   [] FYI TAB  [] MYCHART ACTIVE?  [] LAST PANEL ENCOUNTER  [] FUTURE APPTS  [] IMMUNIZATIONS  [] Media Tab            Action needed:   Patient needs office visit for eye exam.    Type of outreach:    Sent [employer]hart message.                                                                               Tahira Magallon/JET  [place]---[facility]

## 2024-10-13 ENCOUNTER — HEALTH MAINTENANCE LETTER (OUTPATIENT)
Age: 34
End: 2024-10-13

## 2024-11-15 ENCOUNTER — APPOINTMENT (OUTPATIENT)
Dept: GENERAL RADIOLOGY | Facility: CLINIC | Age: 34
End: 2024-11-15
Attending: EMERGENCY MEDICINE

## 2024-11-15 ENCOUNTER — HOSPITAL ENCOUNTER (EMERGENCY)
Facility: CLINIC | Age: 34
Discharge: HOME | End: 2024-11-15
Attending: EMERGENCY MEDICINE

## 2024-11-15 VITALS
HEART RATE: 71 BPM | OXYGEN SATURATION: 100 % | TEMPERATURE: 97.8 F | BODY MASS INDEX: 31.33 KG/M2 | DIASTOLIC BLOOD PRESSURE: 106 MMHG | RESPIRATION RATE: 16 BRPM | SYSTOLIC BLOOD PRESSURE: 176 MMHG | WEIGHT: 188.05 LBS | HEIGHT: 65 IN

## 2024-11-15 DIAGNOSIS — S92.345A CLOSED NONDISPLACED FRACTURE OF FOURTH METATARSAL BONE OF LEFT FOOT, INITIAL ENCOUNTER: ICD-10-CM

## 2024-11-15 DIAGNOSIS — I15.9 SECONDARY HYPERTENSION: ICD-10-CM

## 2024-11-15 DIAGNOSIS — N17.9 AKI (ACUTE KIDNEY INJURY) (H): ICD-10-CM

## 2024-11-15 LAB
ANION GAP SERPL CALCULATED.3IONS-SCNC: 12 MMOL/L (ref 7–15)
ATRIAL RATE - MUSE: 82 BPM
BASOPHILS # BLD AUTO: 0.1 10E3/UL (ref 0–0.2)
BASOPHILS NFR BLD AUTO: 1 %
BUN SERPL-MCNC: 22 MG/DL (ref 6–20)
CALCIUM SERPL-MCNC: 8.9 MG/DL (ref 8.8–10.4)
CHLORIDE SERPL-SCNC: 108 MMOL/L (ref 98–107)
CREAT SERPL-MCNC: 1.35 MG/DL (ref 0.67–1.17)
DIASTOLIC BLOOD PRESSURE - MUSE: NORMAL MMHG
EGFRCR SERPLBLD CKD-EPI 2021: 72 ML/MIN/1.73M2
EOSINOPHIL # BLD AUTO: 0.3 10E3/UL (ref 0–0.7)
EOSINOPHIL NFR BLD AUTO: 3 %
ERYTHROCYTE [DISTWIDTH] IN BLOOD BY AUTOMATED COUNT: 12.7 % (ref 10–15)
GLUCOSE SERPL-MCNC: 288 MG/DL (ref 70–99)
HCO3 SERPL-SCNC: 20 MMOL/L (ref 22–29)
HCT VFR BLD AUTO: 36.5 % (ref 40–53)
HGB BLD-MCNC: 12.7 G/DL (ref 13.3–17.7)
HOLD SPECIMEN: NORMAL
HOLD SPECIMEN: NORMAL
IMM GRANULOCYTES # BLD: 0 10E3/UL
IMM GRANULOCYTES NFR BLD: 0 %
INTERPRETATION ECG - MUSE: NORMAL
LYMPHOCYTES # BLD AUTO: 2.2 10E3/UL (ref 0.8–5.3)
LYMPHOCYTES NFR BLD AUTO: 22 %
MCH RBC QN AUTO: 30.5 PG (ref 26.5–33)
MCHC RBC AUTO-ENTMCNC: 34.8 G/DL (ref 31.5–36.5)
MCV RBC AUTO: 88 FL (ref 78–100)
MONOCYTES # BLD AUTO: 0.7 10E3/UL (ref 0–1.3)
MONOCYTES NFR BLD AUTO: 7 %
NEUTROPHILS # BLD AUTO: 6.9 10E3/UL (ref 1.6–8.3)
NEUTROPHILS NFR BLD AUTO: 68 %
NRBC # BLD AUTO: 0 10E3/UL
NRBC BLD AUTO-RTO: 0 /100
P AXIS - MUSE: 64 DEGREES
PLATELET # BLD AUTO: 223 10E3/UL (ref 150–450)
POTASSIUM SERPL-SCNC: 4.3 MMOL/L (ref 3.4–5.3)
PR INTERVAL - MUSE: 146 MS
QRS DURATION - MUSE: 92 MS
QT - MUSE: 336 MS
QTC - MUSE: 392 MS
R AXIS - MUSE: 63 DEGREES
RBC # BLD AUTO: 4.17 10E6/UL (ref 4.4–5.9)
SODIUM SERPL-SCNC: 140 MMOL/L (ref 135–145)
SYSTOLIC BLOOD PRESSURE - MUSE: NORMAL MMHG
T AXIS - MUSE: 47 DEGREES
VENTRICULAR RATE- MUSE: 82 BPM
WBC # BLD AUTO: 10.2 10E3/UL (ref 4–11)

## 2024-11-15 PROCEDURE — 258N000003 HC RX IP 258 OP 636: Performed by: EMERGENCY MEDICINE

## 2024-11-15 PROCEDURE — 99285 EMERGENCY DEPT VISIT HI MDM: CPT | Mod: 25

## 2024-11-15 PROCEDURE — 82374 ASSAY BLOOD CARBON DIOXIDE: CPT | Performed by: EMERGENCY MEDICINE

## 2024-11-15 PROCEDURE — 96360 HYDRATION IV INFUSION INIT: CPT

## 2024-11-15 PROCEDURE — 28470 CLTX METATARSAL FX WO MNP EA: CPT | Mod: LT

## 2024-11-15 PROCEDURE — 36415 COLL VENOUS BLD VENIPUNCTURE: CPT | Performed by: EMERGENCY MEDICINE

## 2024-11-15 PROCEDURE — 250N000013 HC RX MED GY IP 250 OP 250 PS 637: Performed by: EMERGENCY MEDICINE

## 2024-11-15 PROCEDURE — 73630 X-RAY EXAM OF FOOT: CPT | Mod: LT

## 2024-11-15 PROCEDURE — 93005 ELECTROCARDIOGRAM TRACING: CPT

## 2024-11-15 PROCEDURE — 80048 BASIC METABOLIC PNL TOTAL CA: CPT | Performed by: EMERGENCY MEDICINE

## 2024-11-15 PROCEDURE — 85025 COMPLETE CBC W/AUTO DIFF WBC: CPT | Performed by: EMERGENCY MEDICINE

## 2024-11-15 RX ORDER — ACETAMINOPHEN 500 MG
1000 TABLET ORAL ONCE
Status: COMPLETED | OUTPATIENT
Start: 2024-11-15 | End: 2024-11-15

## 2024-11-15 RX ORDER — LISINOPRIL 20 MG/1
20 TABLET ORAL DAILY
Qty: 30 TABLET | Refills: 0 | Status: SHIPPED | OUTPATIENT
Start: 2024-11-15 | End: 2024-12-15

## 2024-11-15 RX ADMIN — ACETAMINOPHEN 1000 MG: 500 TABLET, FILM COATED ORAL at 17:12

## 2024-11-15 RX ADMIN — SODIUM CHLORIDE, POTASSIUM CHLORIDE, SODIUM LACTATE AND CALCIUM CHLORIDE 1000 ML: 600; 310; 30; 20 INJECTION, SOLUTION INTRAVENOUS at 17:12

## 2024-11-15 ASSESSMENT — COLUMBIA-SUICIDE SEVERITY RATING SCALE - C-SSRS
6. HAVE YOU EVER DONE ANYTHING, STARTED TO DO ANYTHING, OR PREPARED TO DO ANYTHING TO END YOUR LIFE?: NO
1. IN THE PAST MONTH, HAVE YOU WISHED YOU WERE DEAD OR WISHED YOU COULD GO TO SLEEP AND NOT WAKE UP?: NO
2. HAVE YOU ACTUALLY HAD ANY THOUGHTS OF KILLING YOURSELF IN THE PAST MONTH?: NO

## 2024-11-15 ASSESSMENT — ACTIVITIES OF DAILY LIVING (ADL)
ADLS_ACUITY_SCORE: 0
ADLS_ACUITY_SCORE: 0

## 2024-11-15 NOTE — Clinical Note
Ubaldo Zamora was seen and treated in our emergency department on 11/15/2024.  He may return to work on 11/18/2024.  Return to work as tolerated by pain and discomfort of the left foot.  Should be able to wear his hard soled shoe if needed.     If you have any questions or concerns, please don't hesitate to call.      Guy La MD

## 2024-11-15 NOTE — ED PROVIDER NOTES
"  Emergency Department Note      History of Present Illness     Chief Complaint   Foot Pain and Hypertension      HPI   Ubaldo Zamora is a 34 year old male with 4 days left foot pain no known trauma but has been working as well as a playing football.  Pain and swelling to the dorsum of the left midfoot.  No fever, chills, nausea, vomiting, no chest pain, no shortness of breath.  Been out of his blood pressure medicine for the last couple of months.  He is been taking all of his other medications including insulin by report.  Trying ibuprofen without significant relief of the foot pain.  Described as crampy and sore on the left fifth metatarsal.  Has not noticed any breakdown in the skin redness or other wounds.    Independent Historian   None    Review of External Notes       Past Medical History     Medical History and Problem List   Type II diabetes   DKA  Pancreatitis  Hypertension  Hyperlipidemia  Depression  Tobacco use     Medications   Glucagon  Novolog  Glargine  Humalog   Lisinopril    Surgical History   Scrotum surgery   T&A    Physical Exam     Patient Vitals for the past 24 hrs:   BP Temp Temp src Pulse Resp SpO2 Height Weight   11/15/24 1813 (!) 176/106 -- -- 71 16 100 % -- --   11/15/24 1551 (!) 189/100 -- -- -- -- -- -- --   11/15/24 1547 (!) 196/94 97.8  F (36.6  C) Temporal 85 16 98 % 1.651 m (5' 5\") 85.3 kg (188 lb 0.8 oz)     Physical Exam      CV: ppi, regular   Resp: speaking in full sentences without any resp distress  Skin: warm dry well perfused  Neuro: Alert, no gross motor or sensory deficits,  gait stable      Extremity: Left lower extremity there is mild tenderness palpation soft tissue swelling on the dorsum of the midfoot most prominent over the fourth and fifth metatarsal.  No open wounds.  No erythema.  No crepitus.  PT DP pulse are 2 out of 4, foot is warm and symmetric to the contralateral side.  Sensation intact in all dermatomes.  No tenderness of the Achilles tendon or the " medial or lateral malleolus.  No tenderness or swelling more proximally on the left lower extremity.  Plantarflexion dorsiflexion intact.  Inversion causes pain in the fourth/fifth metatarsal area.      Diagnostics     Lab Results   Labs Ordered and Resulted from Time of ED Arrival to Time of ED Departure   BASIC METABOLIC PANEL - Abnormal       Result Value    Sodium 140      Potassium 4.3      Chloride 108 (*)     Carbon Dioxide (CO2) 20 (*)     Anion Gap 12      Urea Nitrogen 22.0 (*)     Creatinine 1.35 (*)     GFR Estimate 72      Calcium 8.9      Glucose 288 (*)    CBC WITH PLATELETS AND DIFFERENTIAL - Abnormal    WBC Count 10.2      RBC Count 4.17 (*)     Hemoglobin 12.7 (*)     Hematocrit 36.5 (*)     MCV 88      MCH 30.5      MCHC 34.8      RDW 12.7      Platelet Count 223      % Neutrophils 68      % Lymphocytes 22      % Monocytes 7      % Eosinophils 3      % Basophils 1      % Immature Granulocytes 0      NRBCs per 100 WBC 0      Absolute Neutrophils 6.9      Absolute Lymphocytes 2.2      Absolute Monocytes 0.7      Absolute Eosinophils 0.3      Absolute Basophils 0.1      Absolute Immature Granulocytes 0.0      Absolute NRBCs 0.0         Imaging   Foot XR, G/E 3 views, left   Final Result   IMPRESSION:       There is a small, acute appearing, nondisplaced fracture involving the medial cortex of the distal fourth metatarsal shaft.      No dislocation. Normal joint spacing. No Lisfranc interval widening on these nonweightbearing views.          EKG   ECG taken at 1608, ECG read at 1613  Sinus rhythm  Poor R-wave progression; consider anterior infarct, lead placement, or normal variant    Rate 82 bpm. NE interval 146 ms. QRS duration 92 ms. QT/QTc 336/392 ms. P-R-T axes 64 63 47.    Independent Interpretation   None    ED Course      Medications Administered   Medications   acetaminophen (TYLENOL) tablet 1,000 mg (1,000 mg Oral $Given 11/15/24 1750)   lactated ringers BOLUS 1,000 mL (0 mLs Intravenous  Stopped 11/15/24 1808)       Procedures   Procedures     Discussion of Management   None    ED Course   ED Course as of 11/15/24 1941   Fri Nov 15, 2024   1702 I obtained history and examined the patient as noted above.    1708 I rechecked and updated the patient.        Additional Documentation  None    Medical Decision Making / Diagnosis     CMS Diagnoses: None    MIPS       None    MDM   Ubaldo Zamora is a 34 year old male left foot pain no noted trauma crush injury etc.  Radiograph shows the above findings concerning for fourth metatarsal fracture, stable injury, nondisplaced, no evidence of soft tissue infection or neurovascular compromise.  No suspicion clinically for DVT.  Will do hard soled shoe, weightbearing as tolerated, podiatry referral.  Out of his blood pressure medicine and will give him lisinopril.  Mild creatinine elevation relative to that last July.  Could be a hydration component no concerning electrolyte findings was given intravenous fluids here encouraged to hydrate well orally at home and recheck in a week.      Disposition   The patient was discharged.     Diagnosis     ICD-10-CM    1. Closed nondisplaced fracture of fourth metatarsal bone of left foot, initial encounter  S92.345A Orthopedic  Referral     Ankle/Foot Bracing Supplies Order Post-op Shoe; Left      2. Secondary hypertension  I15.9       3. VINOD (acute kidney injury) (H)  N17.9            Discharge Medications   Discharge Medication List as of 11/15/2024  6:08 PM        START taking these medications    Details   !! lisinopril (ZESTRIL) 20 MG tablet Take 1 tablet (20 mg) by mouth daily., Disp-30 tablet, R-0, E-Prescribe       !! - Potential duplicate medications found. Please discuss with provider.          Scribe Disclosure:  I, Shea Zheng, am serving as a scribe at 5:02 PM on 11/15/2024 to document services personally performed by Guy La MD based on my observations and the provider's  statements to me.        Guy La MD  11/15/24 1941

## 2024-11-15 NOTE — DISCHARGE INSTRUCTIONS
See regular doctor in 1 week to recheck your blood pressure and kidney function.  Stay well-hydrated.        Foot and ankle specialist will reach out to you for follow-up of the foot fracture.

## 2024-11-15 NOTE — ED TRIAGE NOTES
Patient reports left foot pain for about 4 days.  No known injury, no wounds, no redness per pt report.  Ambulatory upon triage.  History of DM.      Patient also reports running out of lisinopril.  ABCs intact, A&Ox4.     Triage Assessment (Adult)       Row Name 11/15/24 1548          Triage Assessment    Airway WDL WDL        Respiratory WDL    Respiratory WDL WDL        Skin Circulation/Temperature WDL    Skin Circulation/Temperature WDL WDL        Cardiac WDL    Cardiac WDL WDL        Peripheral/Neurovascular WDL    Peripheral Neurovascular WDL WDL        Cognitive/Neuro/Behavioral WDL    Cognitive/Neuro/Behavioral WDL WDL

## 2024-11-18 LAB
ATRIAL RATE - MUSE: 82 BPM
DIASTOLIC BLOOD PRESSURE - MUSE: NORMAL MMHG
INTERPRETATION ECG - MUSE: NORMAL
P AXIS - MUSE: 64 DEGREES
PR INTERVAL - MUSE: 146 MS
QRS DURATION - MUSE: 92 MS
QT - MUSE: 336 MS
QTC - MUSE: 392 MS
R AXIS - MUSE: 63 DEGREES
SYSTOLIC BLOOD PRESSURE - MUSE: NORMAL MMHG
T AXIS - MUSE: 47 DEGREES
VENTRICULAR RATE- MUSE: 82 BPM

## 2024-11-21 ENCOUNTER — OFFICE VISIT (OUTPATIENT)
Dept: PODIATRY | Facility: CLINIC | Age: 34
End: 2024-11-21
Attending: EMERGENCY MEDICINE

## 2024-11-21 VITALS — WEIGHT: 188 LBS | DIASTOLIC BLOOD PRESSURE: 80 MMHG | SYSTOLIC BLOOD PRESSURE: 140 MMHG | BODY MASS INDEX: 31.28 KG/M2

## 2024-11-21 DIAGNOSIS — S92.345A CLOSED NONDISPLACED FRACTURE OF FOURTH METATARSAL BONE OF LEFT FOOT, INITIAL ENCOUNTER: ICD-10-CM

## 2024-11-21 NOTE — LETTER
11/21/2024      Ubaldo Zamora  [address] [address] [facility] [address]      Dear Colleague,    Thank you for referring your patient, Ubaldo Zamora, to the [facility]. Please see a copy of my visit note below.    ASSESSMENT:  Encounter Diagnosis   Name Primary?     Closed nondisplaced fracture of fourth metatarsal bone of left foot, initial encounter      MEDICAL DECISION MAKING:  Given that there is no recollection of injury and the location of this apparent bony injury, this is considered a stress fracture.    We discussed the cause and nature of these types of injuries.    Recommendations:  PRICE therapy was discussed.  Aircast immobilization for up to 6 weeks.  He was instructed to remove the device for ankle range of motion exercises when seated, to reduce risk of DVT, stiffness and atrophy.  Tensogrip compression    Follow-up in 3 to 4 weeks for recheck and x-ray.    Disclaimer: This note consists of symbols derived from keyboarding, dictation and/or voice recognition software. As a result, there may be errors in the script that have gone undetected. Please consider this when interpreting information found in this chart.    Roger Campbell, DENISE, FACFAS, [facility] Department of Podiatry/Foot & Ankle Surgery      ____________________________________________________________________    HPI:       I was asked by Dr. Guy La to evaluate Ubaldo Zamora in consultation for a fracture of the left fourth metatarsal -follow-up from an emergency department visit on 11/15/2024.  No recollection of injury.  Pain started a week ago, dorsal left foot.  Pain rating a 7 out of 10 and fairly constant.  More pain with prolonged weightbearing activities  He has taken ibuprofen and applied ice.  Surgical shoe was provided in the emergency department.  Ubaldo reports good control of his diabetes.  He works as a  on his feet.    Type 2 diabetes  He denies numbness in his  feet.  Last hemoglobin A1c 6.3% -last record in epic.  *  Past Medical History:   Diagnosis Date     Diabetes (H)     type 2 diabetes     Diabetes mellitus type 2 with complications (H)     history of DKA, recurring infections   *  *  Past Surgical History:   Procedure Laterality Date     GENITOURINARY SURGERY      scrotum     TONSILLECTOMY, ADENOIDECTOMY, COMBINED     *  *  Current Outpatient Medications   Medication Sig Dispense Refill     alcohol swab prep pads Use to swab area of injection/kelechi as directed. 100 each 3     BD PEN NEEDLE JORDYN 2ND GEN 32G X 4 MM miscellaneous USE WITH INSULIN PENS       blood glucose (NO BRAND SPECIFIED) test strip Use to test blood sugar daily or as directed. To accompany: Blood Glucose Monitor Brands: per insurance. 300 strip 11     blood glucose calibration (NO BRAND SPECIFIED) solution To accompany: Blood Glucose Monitor Brands: per insurance. 1 each 3     blood glucose monitoring (NO BRAND SPECIFIED) meter device kit Use to test blood sugar as directed. 1 kit 0     clotrimazole (LOTRIMIN) 1 % external cream Apply topically 2 times daily 60 g 1     Continuous Blood Gluc  (FREESTYLE GEOVANY 2 READER) EDDIE Use to read blood sugars as per 's instructions. 1 each 0     Continuous Glucose Sensor (FREESTYLE GEOVANY 2 SENSOR) MISC CHANGE EVERY 14 DAYS 6 each 0     Glucagon (GVOKE HYPOPEN) 1 MG/0.2ML pen Inject the contents of 1 device under the skin into lower abdomen, outer thigh, or outer upper arm as needed for hypoglycemia. If no response after 15 minutes, additional 1 mg dose from a new device may be injected while waiting for emergency assistance. 0.4 mL 1     insulin aspart (NOVOLOG FLEXPEN) 100 UNIT/ML pen 1 units per 15 gram carb unit before breakfast, lunch and dinner 15 mL 1     insulin glargine 100 UNIT/ML pen Inject 27 Units Subcutaneous daily 15 mL 3     insulin lispro (HUMALOG KWIKPEN) 100 UNIT/ML (1 unit dial) KWIKPEN 1 units per 15 gram carb unit  before breakfast, lunch and dinner up to 20 units daily 15 mL 0     insulin pen needle (32G X 6 MM) 32G X 6 MM miscellaneous Use 4 pen needles daily or as directed. 200 each 0     lisinopril (ZESTRIL) 20 MG tablet Take 1 tablet (20 mg) by mouth daily. 30 tablet 0     lisinopril (ZESTRIL) 20 MG tablet Take 1 tablet (20 mg) by mouth daily 30 tablet 0     Sharps Container MISC 1 Units See Admin Instructions .  Use sharps container as instructed to dispose of needles and lancets. 1 each 11     thin (NO BRAND SPECIFIED) lancets Use with lanceting device. To accompany: Blood Glucose Monitor Brands: per insurance. 100 each 6         EXAM:    Vitals: There were no vitals taken for this visit.  BMI: There is no height or weight on file to calculate BMI.    Constitutional:  Ubaldo Zamora is in no apparent distress, appears well-nourished.  Cooperative with history and physical exam.    Vascular:  Pedal pulses are palpable for both the DP and PT arteries.  CFT < 3 sec.   Generalized edema of the dorsal left foot.    Neuro: Light touch sensation is intact to the L4, L5, S1 distributions  No evidence of weakness, spasticity, or contracture in the lower extremities.     Derm: Normal texture and turgor.  No erythema, ecchymosis, or cyanosis.  No open lesions.     Musculoskeletal:    Lower extremity muscle strength is normal. No gross deformities.  Focal pain on palpation over the midshaft of the left fourth metatarsal.    EXAM: XR FOOT LEFT G/E 3 VIEWS  LOCATION: [facility]  DATE: 11/15/2024     INDICATION: Pain, 5th MT  COMPARISON: 10/13/2024.                                                                      IMPRESSION:      There is a small, acute appearing, nondisplaced fracture involving the medial cortex of the distal fourth metatarsal shaft.     No dislocation. Normal joint spacing. No Lisfranc interval widening on these nonweightbearing views.          Again, thank you for allowing me to  participate in the care of your patient.        Sincerely,        Roger Campbell DPM

## 2024-11-21 NOTE — PATIENT INSTRUCTIONS
Thank you for choosing [facility] Podiat[facility] / Foot & Ankle Surgery!    DR. ZAVALA'S CLINIC LOCATIONS:     [facility] TRIAGE LINE: [phone]   [address] [place] [address] APPOINTMENTS: [phone]   [place] [address] RADIOLOGY: [phone]   (Every other Tues - Wed - Fri PM) SET UP SURGERY: [phone]    PHYSICAL THERAPY: [phone]   [place] SPECIALTY BILLING QUESTIONS: [phone]   [address] FAX: [phone]   [place] [address]    (Thurs & Fri AM)         STRESS FRACTURES   Stress fractures are fractures or near fractures caused by repetitive overuse, rather than one acute injury. The analogy is bending a paper clip repeatedly until it snaps. These are the most common reason for pain on the top ofthe foot. They are usually worse with activity and improve with rest. Frequently patients report buying a new shoe or having a sudden increase in activity, such as beginning a walking program or moving, a few days to a few weeks prior to the pain starting.    Stress fractures rarely show up on x-ray unless there has been significant damage to the bone. They are usually diagnosed based on symptoms. Sometimes they show up on bone scans or MRIs, but these tests are not necessary for diagnosis.     Treatment ranges from decreasing activity to walking casts to crutches. Most patients respond best to a removable walking cast, worn for 4 weeks. This should be worn at all times, except when sleeping, showering, and driving. This removable cast makes the foot pain go away relatively quickly, however, it is clumsy and may irritate knees, hips, and lower back. Sometimes patients wear a thick soled shoe on the other foot to minimize the difference in height ofthe walking cast.   Once the symptoms are gone, patients are slowly returned to regular activity. Too rapid of a return will cause the stress fracture to recur. Some patients have custom inserts made for their shoes to help distribute forces  more evenly, minimizing recurrence.   Stress fractures often develop into complete fractures if not diagnosed early and treated aggressively. If complete fracture occurs, prolonged use of crutches without weight on the foot and possibly surgery may be needed in order to heal.      TOE & METATARSAL FRACTURE   The structure of the foot is complex, consisting of bones, muscles, tendons, and other soft tissues. Of the 26 bones in the foot, 19 are toe bones (phalanges) and metatarsal bones (the long bones in the midfoot). Fractures of the toe and metatarsal bones are common and require evaluation by a specialist. A foot and ankle surgeon should be seen for proper diagnosis and treatment, even if initial treatment has been received in an emergency room.  A fracture is a break in the bone. Fractures can be divided into two categories: traumatic fractures and stress fractures.  Traumatic fractures (also called acute fractures) are caused by a direct blow or impact, such as seriously stubbing your toe. Traumatic fractures can be displaced or non-displaced. If the fracture is displaced, the bone is broken in such a way that it has changed in position (dislocated).  Signs and symptoms of a traumatic fracture include:  You may hear a sound at the time of the break.    Pinpoint pain  (pain at the place of impact) at the time the fracture occurs and perhaps for a few hours later, but often the pain goes away after several hours.   Crooked or abnormal appearance of the toe.   Bruising and swelling the next day.   It is not true that  if you can walk on it, it s not broken.  Evaluation by a foot and ankle surgeon is always recommended.   Stress fractures are tiny, hairline breaks that are usually caused by repetitive stress. Stress fractures often afflict athletes who, for example, too rapidly increase their running mileage. They can also be caused by an abnormal foot structure, deformities, or osteoporosis. Improper footwear may  also lead to stress fractures. Stress fractures should not be ignored. They require proper medical attention to heal correctly.  Symptoms of stress fractures include:  Pain with or after normal activity   Pain that goes away when resting and then returns when standing or during activity    Pinpoint pain  (pain at the site of the fracture) when touched   Swelling, but no bruising   Consequences of Improper Treatment  Some people say that  the doctor can t do anything for a broken bone in the foot.  This is usually not true. In fact, if a fractured toe or metatarsal bone is not treated correctly, serious complications may develop. For example:  A deformity in the bony architecture which may limit the ability to move the foot or cause difficulty in fitting shoes   Arthritis, which may be caused by a fracture in a joint (the juncture where two bones meet), or may be a result of angular deformities that develop when a displaced fracture is severe or hasn t been properly corrected   Chronic pain and deformity   Non-union, or failure to heal, can lead to subsequent surgery or chronic pain.   TREATMENT: Toe Fractures  Fractures of the toe bones are almost always traumatic fractures. Treatment for traumatic fractures depends on the break itself and may include these options:  Rest. Sometimes rest is all that is needed to treat a traumatic fracture of the toe.   Splinting. The toe may be fitted with a splint to keep it in a fixed position.   Rigid or stiff-soled shoe. Wearing a stiff-soled shoe protects the toe and helps keep it properly positioned.    Johnnie taping  the fractured toe to another toe is sometimes appropriate, but in other cases it may be harmful.   Surgery. If the break is badly displaced or if the joint is affected, surgery may be necessary. Surgery often involves the use of fixation devices, such as pins.   TREATMENT: Metatarsal Fractures  Breaks in the metatarsal bones may be either stress or traumatic  fractures. Certain kinds of fractures of the metatarsal bones present unique challenges.  For example, sometimes a fracture of the first metatarsal bone (behind the big toe) can lead to arthritis. Since the big toe is used so frequently and bears more weight than other toes, arthritis in that area can make it painful to walk, bend, or even stand.  Another type of break, called a Miller fracture, occurs at the base of the fifth metatarsal bone (behind the little toe). It is often misdiagnosed as an ankle sprain, and misdiagnosis can have serious consequences since sprains and fractures require different treatments. Your foot and ankle surgeon is an expert in correctly identifying these conditions as well as other problems of the foot.  Treatment of metatarsal fractures depends on the type and extent of the fracture, and may include:  Rest. Sometimes rest is the only treatment needed to promote healing of a stress or traumatic fracture of a metatarsal bone.   Avoid the offending activity. Because stress fractures result from repetitive stress, it is important to avoid the activity that led to the fracture. Crutches or a wheelchair are sometimes required to offload weight from the foot to give it time to heal.   Immobilization, casting, or rigid shoe. A stiff-soled shoe or other form of immobilization may be used to protect the fractured bone while it is healing.   Surgery. Some traumatic fractures of the metatarsal bones require surgery, especially if the break is badly displaced.   Follow-up care. Your foot and ankle surgeon will provide instructions for care following surgical or non-surgical treatment. Physical therapy, exercises and rehabilitation may be included in a schedule for return to normal activities.      AIRCAST / CAM WALKING BOOT INSTRUCTIONS  - Do NOT drive with CAM walker on. This is due to safety and legal issues.   - Do NOT wear the CAM walker on long car/train rides or on an airplane.  - Remove the  "CAM walker several times a day and do ankle range of motion (ROM) exercises/wiggle toes.  - It is recommended that a thick-soled shoe be worn on the other foot to offset any created leg length issue.    - You can purchase an \"even up\" on [employer] to place under the other shoe to help too.  - The boot does not have to be worn at night.   - There is an increased risk of developing a blood clot with lower extremity immobilization. ROM exercises and knee-high compression (tenso /ACE wrap) is recommended to lower that risk.   - You should seek medical attention if you experience calf swelling and/or pain, chest pain, or shortness of breath.   If you need to return or exchange the boot, please contact [facility] @ [phone]   "

## 2024-11-21 NOTE — PROGRESS NOTES
ASSESSMENT:  Encounter Diagnosis   Name Primary?    Closed nondisplaced fracture of fourth metatarsal bone of left foot, initial encounter      MEDICAL DECISION MAKING:  Given that there is no recollection of injury and the location of this apparent bony injury, this is considered a stress fracture.    We discussed the cause and nature of these types of injuries.    Recommendations:  PRICE therapy was discussed.  Aircast immobilization for up to 6 weeks.  He was instructed to remove the device for ankle range of motion exercises when seated, to reduce risk of DVT, stiffness and atrophy.  Tensogrip compression    Follow-up in 3 to 4 weeks for recheck and x-ray.    Disclaimer: This note consists of symbols derived from keyboarding, dictation and/or voice recognition software. As a result, there may be errors in the script that have gone undetected. Please consider this when interpreting information found in this chart.    Roger Campbell DPM, FACFAS, [facility] Department of Podiatry/Foot & Ankle Surgery      ____________________________________________________________________    HPI:       I was asked by Dr. Guy La to evaluate Ubaldo Zamora in consultation for a fracture of the left fourth metatarsal -follow-up from an emergency department visit on 11/15/2024.  No recollection of injury.  Pain started a week ago, dorsal left foot.  Pain rating a 7 out of 10 and fairly constant.  More pain with prolonged weightbearing activities  He has taken ibuprofen and applied ice.  Surgical shoe was provided in the emergency department.  Ubaldo reports good control of his diabetes.  He works as a  on his feet.    Type 2 diabetes  He denies numbness in his feet.  Last hemoglobin A1c 6.3% -last record in epic.  *  Past Medical History:   Diagnosis Date    Diabetes (H)     type 2 diabetes    Diabetes mellitus type 2 with complications (H)     history of DKA, recurring infections   *  *  Past Surgical History:    Procedure Laterality Date    GENITOURINARY SURGERY      scrotum    TONSILLECTOMY, ADENOIDECTOMY, COMBINED     *  *  Current Outpatient Medications   Medication Sig Dispense Refill    alcohol swab prep pads Use to swab area of injection/kelechi as directed. 100 each 3    BD PEN NEEDLE JORDYN 2ND GEN 32G X 4 MM miscellaneous USE WITH INSULIN PENS      blood glucose (NO BRAND SPECIFIED) test strip Use to test blood sugar daily or as directed. To accompany: Blood Glucose Monitor Brands: per insurance. 300 strip 11    blood glucose calibration (NO BRAND SPECIFIED) solution To accompany: Blood Glucose Monitor Brands: per insurance. 1 each 3    blood glucose monitoring (NO BRAND SPECIFIED) meter device kit Use to test blood sugar as directed. 1 kit 0    clotrimazole (LOTRIMIN) 1 % external cream Apply topically 2 times daily 60 g 1    Continuous Blood Gluc  (FREESTYLE GEOVANY 2 READER) EDDIE Use to read blood sugars as per 's instructions. 1 each 0    Continuous Glucose Sensor (FREESTYLE GEOVANY 2 SENSOR) MISC CHANGE EVERY 14 DAYS 6 each 0    Glucagon (GVOKE HYPOPEN) 1 MG/0.2ML pen Inject the contents of 1 device under the skin into lower abdomen, outer thigh, or outer upper arm as needed for hypoglycemia. If no response after 15 minutes, additional 1 mg dose from a new device may be injected while waiting for emergency assistance. 0.4 mL 1    insulin aspart (NOVOLOG FLEXPEN) 100 UNIT/ML pen 1 units per 15 gram carb unit before breakfast, lunch and dinner 15 mL 1    insulin glargine 100 UNIT/ML pen Inject 27 Units Subcutaneous daily 15 mL 3    insulin lispro (HUMALOG KWIKPEN) 100 UNIT/ML (1 unit dial) KWIKPEN 1 units per 15 gram carb unit before breakfast, lunch and dinner up to 20 units daily 15 mL 0    insulin pen needle (32G X 6 MM) 32G X 6 MM miscellaneous Use 4 pen needles daily or as directed. 200 each 0    lisinopril (ZESTRIL) 20 MG tablet Take 1 tablet (20 mg) by mouth daily. 30 tablet 0    lisinopril  (ZESTRIL) 20 MG tablet Take 1 tablet (20 mg) by mouth daily 30 tablet 0    Sharps Container MISC 1 Units See Admin Instructions .  Use sharps container as instructed to dispose of needles and lancets. 1 each 11    thin (NO BRAND SPECIFIED) lancets Use with lanceting device. To accompany: Blood Glucose Monitor Brands: per insurance. 100 each 6         EXAM:    Vitals: There were no vitals taken for this visit.  BMI: There is no height or weight on file to calculate BMI.    Constitutional:  Ubaldo Zamora is in no apparent distress, appears well-nourished.  Cooperative with history and physical exam.    Vascular:  Pedal pulses are palpable for both the DP and PT arteries.  CFT < 3 sec.   Generalized edema of the dorsal left foot.    Neuro: Light touch sensation is intact to the L4, L5, S1 distributions  No evidence of weakness, spasticity, or contracture in the lower extremities.     Derm: Normal texture and turgor.  No erythema, ecchymosis, or cyanosis.  No open lesions.     Musculoskeletal:    Lower extremity muscle strength is normal. No gross deformities.  Focal pain on palpation over the midshaft of the left fourth metatarsal.    EXAM: XR FOOT LEFT G/E 3 VIEWS  LOCATION: [facility]  DATE: 11/15/2024     INDICATION: Pain, 5th MT  COMPARISON: 10/13/2024.                                                                      IMPRESSION:      There is a small, acute appearing, nondisplaced fracture involving the medial cortex of the distal fourth metatarsal shaft.     No dislocation. Normal joint spacing. No Lisfranc interval widening on these nonweightbearing views.

## 2024-12-12 ENCOUNTER — TELEPHONE (OUTPATIENT)
Dept: PODIATRY | Facility: CLINIC | Age: 34
End: 2024-12-12

## 2024-12-12 NOTE — LETTER
December 23, 2024      Ubaldo Zamora  [address] [address] [id number]        Dear Ubaldo,     We have made 3 attempts to contact you regarding the need to reschedule your appointment on 1/2/25 with Dr. Campbell .  Please contact the clinic for further information or schedule an appointment to further discuss.    Sincerely,        Roger Campbell, DENISE

## 2024-12-16 NOTE — TELEPHONE ENCOUNTER
No consent to communicate on file. Voicemail left for patient to discuss appointment rescheduling. MyChart also sent.     If patient returns call, office looking to schedule with Dr. Campbell on 12/30.     Cornelia Martinez ATC

## 2024-12-17 NOTE — TELEPHONE ENCOUNTER
Voicemail left asking for return call or reply to [employer] message.     Office looking to schedule appointment for 12/30.     Cornelia Martinez ATC

## 2024-12-21 ENCOUNTER — HEALTH MAINTENANCE LETTER (OUTPATIENT)
Age: 34
End: 2024-12-21

## 2024-12-23 NOTE — TELEPHONE ENCOUNTER
Patient has not responded to our calls and does not check Mychart. Letter sent. Closing encounter.     CHANDLER Baldwin RN

## 2024-12-28 DIAGNOSIS — E11.628 TYPE 2 DIABETES MELLITUS WITH OTHER SKIN COMPLICATION, WITHOUT LONG-TERM CURRENT USE OF INSULIN (H): ICD-10-CM

## 2024-12-30 NOTE — TELEPHONE ENCOUNTER
Patient canceled appointment.    Future Appointments 12/30/2024 - 6/28/2025        Date Visit Type Length Department Provider     6/10/2025  8:00 AM RETURN DIABETES 30 min [facility] ENDOCRINOLOGY Yuki Hendrix PA-C

## 2025-01-14 ENCOUNTER — TELEPHONE (OUTPATIENT)
Dept: PODIATRY | Facility: CLINIC | Age: 35
End: 2025-01-14

## 2025-01-14 NOTE — TELEPHONE ENCOUNTER
Ubaldo was suppose to see Dr Campbell on 01/02 but Dr Campbell reschedule the appointment until 01/16.  Now Dr Campbell is not going to be in the office on 01/16 so the patient's appointment has been pushed out until 01/30/25.  This has added an additional month in the boot.  Patient would like to take the boot off.  Please call patient at [phone] to advise if he can take it off.

## 2025-01-15 NOTE — TELEPHONE ENCOUNTER
Left another message asking patient to return our call as soon as possible regarding an open appointment today with Dr. Campbell.   See below for appointment being held.     CHANDLER Baldwin RN

## 2025-01-15 NOTE — TELEPHONE ENCOUNTER
There is an acute opening today, 1/15/25 at 2:45 at the [place] location. HOLD placed.     There isn't a consent to communicate on file. Patient does not check [employer] messages.     Left voicemail asking for a return call. Asked that if he leaves a message to let us know if we may leave a more detailed message if we are unable to connect again.     CHANDLER Baldwin RN

## 2025-01-15 NOTE — TELEPHONE ENCOUNTER
Called pt yesterday 1/14/2025 and offered him to come Wednesday to [facility] for a follow up, pt declined the offer stating he have to be at work.    Isabella Morillo MA    .

## 2025-01-15 NOTE — TELEPHONE ENCOUNTER
Left the 3rd message for today asking for a return call regarding an appointment.     CHANDLER Baldwin RN

## 2025-01-17 DIAGNOSIS — E11.628 TYPE 2 DIABETES MELLITUS WITH OTHER SKIN COMPLICATION, WITHOUT LONG-TERM CURRENT USE OF INSULIN (H): ICD-10-CM

## 2025-01-23 RX ORDER — LISINOPRIL 20 MG/1
20 TABLET ORAL DAILY
Qty: 90 TABLET | Refills: 0 | Status: SHIPPED | OUTPATIENT
Start: 2025-01-23

## 2025-01-23 NOTE — TELEPHONE ENCOUNTER
Requested Prescriptions   Pending Prescriptions Disp Refills    lisinopril (ZESTRIL) 20 MG tablet [Pharmacy Med Name: LISINOPRIL 20MG TABLETS] 30 tablet 0     Sig: TAKE 1 TABLET(20 MG) BY MOUTH DAILY       ACE Inhibitors (Including Combos) Protocol Failed - 1/23/2025  9:12 AM        Failed - Most recent blood pressure under 140/90 in past 12 months- Clinicial or Patient Reported     BP Readings from Last 3 Encounters:   11/21/24 (!) 140/80   11/15/24 (!) 176/106   06/18/24 (!) 151/94       No data recorded            Passed - Medication is active on med list        Passed - Medication indicated for associated diagnosis     Medication is associated with one or more of the following diagnoses:     Chronic Kidney Disease (CKD)   Coronary Artery Disease (CAD)   Diabetes   Heart Failure (HF)   Hypertension (HTN)   Nephropathy   History of myocarditis   Tachycardia induced cardiomyopathy   STEMI (ST elevation myocardial infarction)   Spontaneous dissection of coronary artery   Status post percutaneous transluminal coronary angioplasty            Passed - Recent (12 mo) or future (90 days) visit within the authorizing provider's specialty     The patient must have completed an in-person or virtual visit within the past 12 months or has a future visit scheduled within the next 90 days with the authorizing provider s specialty.  Urgent care and e-visits do not qualify as an office visit for this protocol.          Passed - Most recent GFR on file in the past 12 months >30        Passed - Patient is age 18 or older        Passed - Normal serum potassium on file in past 12 months     Recent Labs   Lab Test 11/15/24  1616   POTASSIUM 4.3

## 2025-01-30 ENCOUNTER — OFFICE VISIT (OUTPATIENT)
Dept: PODIATRY | Facility: CLINIC | Age: 35
End: 2025-01-30

## 2025-01-30 ENCOUNTER — ANCILLARY PROCEDURE (OUTPATIENT)
Dept: GENERAL RADIOLOGY | Facility: CLINIC | Age: 35
End: 2025-01-30
Attending: PODIATRIST

## 2025-01-30 VITALS — DIASTOLIC BLOOD PRESSURE: 80 MMHG | SYSTOLIC BLOOD PRESSURE: 120 MMHG | WEIGHT: 188 LBS | BODY MASS INDEX: 31.28 KG/M2

## 2025-01-30 DIAGNOSIS — S92.345A CLOSED NONDISPLACED FRACTURE OF FOURTH METATARSAL BONE OF LEFT FOOT, INITIAL ENCOUNTER: Primary | ICD-10-CM

## 2025-01-30 NOTE — LETTER
1/30/2025      Ubaldo Zamora  [address] [address] [address]      Dear Colleague,    Thank you for referring your patient, Ubaldo Zamora, to the [facility]. Please see a copy of my visit note below.    ASSESSMENT:  Encounter Diagnosis   Name Primary?     Closed nondisplaced fracture of fourth metatarsal bone of left foot, initial encounter Yes     MEDICAL DECISION MAKING:  Ongoing demarcation of these suspected stress fracture with bony callus formation.  Radiographic and clinical healing  Recommendations:  Gradually wean out of the Aircast into a supportive athletic shoe  Gradual return to regular weightbearing activities  Avoid high impact activity for a full 3 months of onset of treatment  Follow-up in 4 to 6 weeks for repeat x-ray optional  Follow-up if any pain or swelling develops it does not resolve with rest.    Disclaimer: This note consists of symbols derived from keyboarding, dictation and/or voice recognition software. As a result, there may be errors in the script that have gone undetected. Please consider this when interpreting information found in this chart.    Roger Campbell DPM, FACFAS, [facility] Department of Podiatry/Foot & Ankle Surgery      ____________________________________________________________________    HPI:       Ubaldo Zamora follows up for a left fourth metatarsal fracture.  He reports wearing the Aircast for nearly 2 months.  He denies pain.  He has tried walking without the boot at home and no pain.    *  Past Medical History:   Diagnosis Date     Diabetes (H)     type 2 diabetes     Diabetes mellitus type 2 with complications (H)     history of DKA, recurring infections   *  *  Past Surgical History:   Procedure Laterality Date     GENITOURINARY SURGERY      scrotum     TONSILLECTOMY, ADENOIDECTOMY, COMBINED     *  *  Current Outpatient Medications   Medication Sig Dispense Refill     alcohol swab prep pads Use to swab area of  injection/kelechi as directed. 100 each 3     BD PEN NEEDLE JORDYN 2ND GEN 32G X 4 MM miscellaneous USE WITH INSULIN PENS       blood glucose (NO BRAND SPECIFIED) test strip Use to test blood sugar daily or as directed. To accompany: Blood Glucose Monitor Brands: per insurance. 300 strip 11     blood glucose calibration (NO BRAND SPECIFIED) solution To accompany: Blood Glucose Monitor Brands: per insurance. 1 each 3     blood glucose monitoring (NO BRAND SPECIFIED) meter device kit Use to test blood sugar as directed. 1 kit 0     clotrimazole (LOTRIMIN) 1 % external cream Apply topically 2 times daily 60 g 1     Continuous Blood Gluc  (FREESTYLE GEOVANY 2 READER) EDDIE Use to read blood sugars as per 's instructions. 1 each 0     Continuous Glucose Sensor (FREESTYLE GEOVANY 2 SENSOR) MISC CHANGE EVERY 14 DAYS 6 each 1     Glucagon (GVOKE HYPOPEN) 1 MG/0.2ML pen Inject the contents of 1 device under the skin into lower abdomen, outer thigh, or outer upper arm as needed for hypoglycemia. If no response after 15 minutes, additional 1 mg dose from a new device may be injected while waiting for emergency assistance. 0.4 mL 1     insulin aspart (NOVOLOG FLEXPEN) 100 UNIT/ML pen 1 units per 15 gram carb unit before breakfast, lunch and dinner 15 mL 1     insulin glargine 100 UNIT/ML pen Inject 27 Units Subcutaneous daily 15 mL 3     insulin lispro (HUMALOG KWIKPEN) 100 UNIT/ML (1 unit dial) KWIKPEN 1 units per 15 gram carb unit before breakfast, lunch and dinner up to 20 units daily 15 mL 0     insulin pen needle (32G X 6 MM) 32G X 6 MM miscellaneous Use 4 pen needles daily or as directed. 200 each 0     lisinopril (ZESTRIL) 20 MG tablet TAKE 1 TABLET(20 MG) BY MOUTH DAILY 90 tablet 0     lisinopril (ZESTRIL) 20 MG tablet Take 1 tablet (20 mg) by mouth daily. 30 tablet 0     Sharps Container MISC 1 Units See Admin Instructions .  Use sharps container as instructed to dispose of needles and lancets. 1 each 11      thin (NO BRAND SPECIFIED) lancets Use with lanceting device. To accompany: Blood Glucose Monitor Brands: per insurance. 100 each 6         EXAM:    Vitals: There were no vitals taken for this visit.  BMI: There is no height or weight on file to calculate BMI.    Vasc:      Pedal pulses are palpable for the dorsalis pedis posterior tibial artery, bilateral foot.  Capillary fill time </= 3 seconds  Pedal skin appears well-perfused  Neuro:      Light touch sensation intact to all sensory nerve distributions, bilateral foot.  No apparent spastic contractures or other deformity secondary to neurologic compromise.  MSK:      On palpation along the left fourth metatarsal shaft, there is no discomfort.  There is an area of thickening that is consistent with bone callus formation.  This is confirmed with the x-ray.    X-Ray Findings:  I personally reviewed the left foot images.  See comments above.          Again, thank you for allowing me to participate in the care of your patient.        Sincerely,        Roger Campbell DPM    Electronically signed

## 2025-01-30 NOTE — PROGRESS NOTES
ASSESSMENT:  Encounter Diagnosis   Name Primary?    Closed nondisplaced fracture of fourth metatarsal bone of left foot, initial encounter Yes     MEDICAL DECISION MAKING:  Ongoing demarcation of these suspected stress fracture with bony callus formation.  Radiographic and clinical healing  Recommendations:  Gradually wean out of the Aircast into a supportive athletic shoe  Gradual return to regular weightbearing activities  Avoid high impact activity for a full 3 months of onset of treatment  Follow-up in 4 to 6 weeks for repeat x-ray optional  Follow-up if any pain or swelling develops it does not resolve with rest.    Disclaimer: This note consists of symbols derived from keyboarding, dictation and/or voice recognition software. As a result, there may be errors in the script that have gone undetected. Please consider this when interpreting information found in this chart.    Roger Campbell DPM, FACFAS, [facility] Department of Podiatry/Foot & Ankle Surgery      ____________________________________________________________________    HPI:       Ubaldo Zamora follows up for a left fourth metatarsal fracture.  He reports wearing the Aircast for nearly 2 months.  He denies pain.  He has tried walking without the boot at home and no pain.    *  Past Medical History:   Diagnosis Date    Diabetes (H)     type 2 diabetes    Diabetes mellitus type 2 with complications (H)     history of DKA, recurring infections   *  *  Past Surgical History:   Procedure Laterality Date    GENITOURINARY SURGERY      scrotum    TONSILLECTOMY, ADENOIDECTOMY, COMBINED     *  *  Current Outpatient Medications   Medication Sig Dispense Refill    alcohol swab prep pads Use to swab area of injection/kelechi as directed. 100 each 3    BD PEN NEEDLE JORDYN 2ND GEN 32G X 4 MM miscellaneous USE WITH INSULIN PENS      blood glucose (NO BRAND SPECIFIED) test strip Use to test blood sugar daily or as directed. To accompany: Blood Glucose Monitor  Brands: per insurance. 300 strip 11    blood glucose calibration (NO BRAND SPECIFIED) solution To accompany: Blood Glucose Monitor Brands: per insurance. 1 each 3    blood glucose monitoring (NO BRAND SPECIFIED) meter device kit Use to test blood sugar as directed. 1 kit 0    clotrimazole (LOTRIMIN) 1 % external cream Apply topically 2 times daily 60 g 1    Continuous Blood Gluc  (FREESTYLE GEOVANY 2 READER) EDDIE Use to read blood sugars as per 's instructions. 1 each 0    Continuous Glucose Sensor (FREESTYLE GEOVANY 2 SENSOR) MISC CHANGE EVERY 14 DAYS 6 each 1    Glucagon (GVOKE HYPOPEN) 1 MG/0.2ML pen Inject the contents of 1 device under the skin into lower abdomen, outer thigh, or outer upper arm as needed for hypoglycemia. If no response after 15 minutes, additional 1 mg dose from a new device may be injected while waiting for emergency assistance. 0.4 mL 1    insulin aspart (NOVOLOG FLEXPEN) 100 UNIT/ML pen 1 units per 15 gram carb unit before breakfast, lunch and dinner 15 mL 1    insulin glargine 100 UNIT/ML pen Inject 27 Units Subcutaneous daily 15 mL 3    insulin lispro (HUMALOG KWIKPEN) 100 UNIT/ML (1 unit dial) KWIKPEN 1 units per 15 gram carb unit before breakfast, lunch and dinner up to 20 units daily 15 mL 0    insulin pen needle (32G X 6 MM) 32G X 6 MM miscellaneous Use 4 pen needles daily or as directed. 200 each 0    lisinopril (ZESTRIL) 20 MG tablet TAKE 1 TABLET(20 MG) BY MOUTH DAILY 90 tablet 0    lisinopril (ZESTRIL) 20 MG tablet Take 1 tablet (20 mg) by mouth daily. 30 tablet 0    Sharps Container MISC 1 Units See Admin Instructions .  Use sharps container as instructed to dispose of needles and lancets. 1 each 11    thin (NO BRAND SPECIFIED) lancets Use with lanceting device. To accompany: Blood Glucose Monitor Brands: per insurance. 100 each 6         EXAM:    Vitals: There were no vitals taken for this visit.  BMI: There is no height or weight on file to calculate  BMI.    Vasc:      Pedal pulses are palpable for the dorsalis pedis posterior tibial artery, bilateral foot.  Capillary fill time </= 3 seconds  Pedal skin appears well-perfused  Neuro:      Light touch sensation intact to all sensory nerve distributions, bilateral foot.  No apparent spastic contractures or other deformity secondary to neurologic compromise.  MSK:      On palpation along the left fourth metatarsal shaft, there is no discomfort.  There is an area of thickening that is consistent with bone callus formation.  This is confirmed with the x-ray.    X-Ray Findings:  I personally reviewed the left foot images.  See comments above.

## 2025-02-13 ENCOUNTER — APPOINTMENT (OUTPATIENT)
Dept: CT IMAGING | Facility: CLINIC | Age: 35
End: 2025-02-13
Attending: EMERGENCY MEDICINE

## 2025-02-13 ENCOUNTER — HOSPITAL ENCOUNTER (EMERGENCY)
Facility: CLINIC | Age: 35
Discharge: HOME | End: 2025-02-13
Attending: EMERGENCY MEDICINE

## 2025-02-13 VITALS
DIASTOLIC BLOOD PRESSURE: 103 MMHG | OXYGEN SATURATION: 100 % | TEMPERATURE: 98.1 F | HEART RATE: 78 BPM | RESPIRATION RATE: 18 BRPM | SYSTOLIC BLOOD PRESSURE: 185 MMHG

## 2025-02-13 DIAGNOSIS — N20.1 URETEROLITHIASIS: ICD-10-CM

## 2025-02-13 DIAGNOSIS — R79.89 ELEVATED SERUM CREATININE: ICD-10-CM

## 2025-02-13 DIAGNOSIS — N23 RENAL COLIC ON RIGHT SIDE: ICD-10-CM

## 2025-02-13 DIAGNOSIS — R03.0 ELEVATED BLOOD PRESSURE READING: ICD-10-CM

## 2025-02-13 LAB
ALBUMIN SERPL BCG-MCNC: 4.4 G/DL (ref 3.5–5.2)
ALBUMIN UR-MCNC: 100 MG/DL
ALP SERPL-CCNC: 130 U/L (ref 40–150)
ALT SERPL W P-5'-P-CCNC: 17 U/L (ref 0–70)
ANION GAP SERPL CALCULATED.3IONS-SCNC: 11 MMOL/L (ref 7–15)
APPEARANCE UR: CLEAR
AST SERPL W P-5'-P-CCNC: 15 U/L (ref 0–45)
BASOPHILS # BLD AUTO: 0.1 10E3/UL (ref 0–0.2)
BASOPHILS NFR BLD AUTO: 1 %
BILIRUB SERPL-MCNC: 0.2 MG/DL
BILIRUB UR QL STRIP: NEGATIVE
BUN SERPL-MCNC: 24.9 MG/DL (ref 6–20)
CALCIUM SERPL-MCNC: 9.4 MG/DL (ref 8.8–10.4)
CHLORIDE SERPL-SCNC: 109 MMOL/L (ref 98–107)
COLOR UR AUTO: ABNORMAL
CREAT SERPL-MCNC: 1.47 MG/DL (ref 0.67–1.17)
EGFRCR SERPLBLD CKD-EPI 2021: 65 ML/MIN/1.73M2
EOSINOPHIL # BLD AUTO: 0.2 10E3/UL (ref 0–0.7)
EOSINOPHIL NFR BLD AUTO: 2 %
ERYTHROCYTE [DISTWIDTH] IN BLOOD BY AUTOMATED COUNT: 12.7 % (ref 10–15)
GLUCOSE SERPL-MCNC: 205 MG/DL (ref 70–99)
GLUCOSE UR STRIP-MCNC: 150 MG/DL
HCO3 SERPL-SCNC: 20 MMOL/L (ref 22–29)
HCT VFR BLD AUTO: 35 % (ref 40–53)
HGB BLD-MCNC: 12.4 G/DL (ref 13.3–17.7)
HGB UR QL STRIP: ABNORMAL
HOLD SPECIMEN: NORMAL
HOLD SPECIMEN: NORMAL
IMM GRANULOCYTES # BLD: 0.1 10E3/UL
IMM GRANULOCYTES NFR BLD: 0 %
KETONES UR STRIP-MCNC: NEGATIVE MG/DL
LEUKOCYTE ESTERASE UR QL STRIP: NEGATIVE
LYMPHOCYTES # BLD AUTO: 1.5 10E3/UL (ref 0.8–5.3)
LYMPHOCYTES NFR BLD AUTO: 11 %
MCH RBC QN AUTO: 30.7 PG (ref 26.5–33)
MCHC RBC AUTO-ENTMCNC: 35.4 G/DL (ref 31.5–36.5)
MCV RBC AUTO: 87 FL (ref 78–100)
MONOCYTES # BLD AUTO: 0.8 10E3/UL (ref 0–1.3)
MONOCYTES NFR BLD AUTO: 6 %
MUCOUS THREADS #/AREA URNS LPF: PRESENT /LPF
NEUTROPHILS # BLD AUTO: 11.3 10E3/UL (ref 1.6–8.3)
NEUTROPHILS NFR BLD AUTO: 81 %
NITRATE UR QL: NEGATIVE
NRBC # BLD AUTO: 0 10E3/UL
NRBC BLD AUTO-RTO: 0 /100
PH UR STRIP: 6 [PH] (ref 5–7)
PLATELET # BLD AUTO: 222 10E3/UL (ref 150–450)
POTASSIUM SERPL-SCNC: 4 MMOL/L (ref 3.4–5.3)
PROT SERPL-MCNC: 6.5 G/DL (ref 6.4–8.3)
RBC # BLD AUTO: 4.04 10E6/UL (ref 4.4–5.9)
RBC URINE: 162 /HPF
SODIUM SERPL-SCNC: 140 MMOL/L (ref 135–145)
SP GR UR STRIP: 1.01 (ref 1–1.03)
UROBILINOGEN UR STRIP-MCNC: NORMAL MG/DL
WBC # BLD AUTO: 14 10E3/UL (ref 4–11)
WBC URINE: 4 /HPF

## 2025-02-13 PROCEDURE — 250N000013 HC RX MED GY IP 250 OP 250 PS 637: Performed by: EMERGENCY MEDICINE

## 2025-02-13 PROCEDURE — 81001 URINALYSIS AUTO W/SCOPE: CPT | Performed by: EMERGENCY MEDICINE

## 2025-02-13 PROCEDURE — 85025 COMPLETE CBC W/AUTO DIFF WBC: CPT | Performed by: EMERGENCY MEDICINE

## 2025-02-13 PROCEDURE — 36415 COLL VENOUS BLD VENIPUNCTURE: CPT | Performed by: EMERGENCY MEDICINE

## 2025-02-13 PROCEDURE — 258N000003 HC RX IP 258 OP 636: Performed by: EMERGENCY MEDICINE

## 2025-02-13 PROCEDURE — 96375 TX/PRO/DX INJ NEW DRUG ADDON: CPT

## 2025-02-13 PROCEDURE — 72131 CT LUMBAR SPINE W/O DYE: CPT

## 2025-02-13 PROCEDURE — 250N000011 HC RX IP 250 OP 636: Performed by: EMERGENCY MEDICINE

## 2025-02-13 PROCEDURE — 96361 HYDRATE IV INFUSION ADD-ON: CPT

## 2025-02-13 PROCEDURE — 96374 THER/PROPH/DIAG INJ IV PUSH: CPT

## 2025-02-13 PROCEDURE — 80053 COMPREHEN METABOLIC PANEL: CPT | Performed by: EMERGENCY MEDICINE

## 2025-02-13 PROCEDURE — 99285 EMERGENCY DEPT VISIT HI MDM: CPT | Mod: 25

## 2025-02-13 PROCEDURE — 74176 CT ABD & PELVIS W/O CONTRAST: CPT

## 2025-02-13 RX ORDER — MORPHINE SULFATE 4 MG/ML
4 INJECTION, SOLUTION INTRAMUSCULAR; INTRAVENOUS ONCE
Status: COMPLETED | OUTPATIENT
Start: 2025-02-13 | End: 2025-02-13

## 2025-02-13 RX ORDER — ONDANSETRON 4 MG/1
4 TABLET, ORALLY DISINTEGRATING ORAL EVERY 6 HOURS PRN
Qty: 10 TABLET | Refills: 0 | Status: SHIPPED | OUTPATIENT
Start: 2025-02-13 | End: 2025-02-16

## 2025-02-13 RX ORDER — KETOROLAC TROMETHAMINE 15 MG/ML
10 INJECTION, SOLUTION INTRAMUSCULAR; INTRAVENOUS ONCE
Status: COMPLETED | OUTPATIENT
Start: 2025-02-13 | End: 2025-02-13

## 2025-02-13 RX ORDER — TAMSULOSIN HYDROCHLORIDE 0.4 MG/1
0.4 CAPSULE ORAL ONCE
Status: COMPLETED | OUTPATIENT
Start: 2025-02-13 | End: 2025-02-13

## 2025-02-13 RX ORDER — OXYCODONE HYDROCHLORIDE 5 MG/1
5 TABLET ORAL EVERY 6 HOURS PRN
Qty: 12 TABLET | Refills: 0 | Status: SHIPPED | OUTPATIENT
Start: 2025-02-13

## 2025-02-13 RX ORDER — TAMSULOSIN HYDROCHLORIDE 0.4 MG/1
0.4 CAPSULE ORAL DAILY
Qty: 7 CAPSULE | Refills: 0 | Status: SHIPPED | OUTPATIENT
Start: 2025-02-13

## 2025-02-13 RX ADMIN — MORPHINE SULFATE 4 MG: 4 INJECTION, SOLUTION INTRAMUSCULAR; INTRAVENOUS at 01:37

## 2025-02-13 RX ADMIN — TAMSULOSIN HYDROCHLORIDE 0.4 MG: 0.4 CAPSULE ORAL at 02:26

## 2025-02-13 RX ADMIN — KETOROLAC TROMETHAMINE 10 MG: 15 INJECTION, SOLUTION INTRAMUSCULAR; INTRAVENOUS at 01:36

## 2025-02-13 RX ADMIN — SODIUM CHLORIDE 1000 ML: 9 INJECTION, SOLUTION INTRAVENOUS at 01:27

## 2025-02-13 ASSESSMENT — COLUMBIA-SUICIDE SEVERITY RATING SCALE - C-SSRS
1. IN THE PAST MONTH, HAVE YOU WISHED YOU WERE DEAD OR WISHED YOU COULD GO TO SLEEP AND NOT WAKE UP?: NO
2. HAVE YOU ACTUALLY HAD ANY THOUGHTS OF KILLING YOURSELF IN THE PAST MONTH?: NO
6. HAVE YOU EVER DONE ANYTHING, STARTED TO DO ANYTHING, OR PREPARED TO DO ANYTHING TO END YOUR LIFE?: NO

## 2025-02-13 ASSESSMENT — ACTIVITIES OF DAILY LIVING (ADL)
ADLS_ACUITY_SCORE: 53
ADLS_ACUITY_SCORE: 53

## 2025-02-13 NOTE — ED PROVIDER NOTES
Emergency Department Note      History of Present Illness     Chief Complaint   Back Pain    HPI   Ubaldo Zamora is a 34 year old male with a history of type 2 diabetes mellitus and hypertension presenting with lower back pain. The patient reports worsening right-sided lower back pain over the past few days, radiating into his abdomen and groin. Ubaldo notes his pain was specially severe yesterday, unable to manage it. He states that sitting for standing for long periods of time aggravate his pain, noting that it seems to be positional. Ubaldo had a fall at work on Sunday, landing onover back with no head injuries noted. He takes Lisinopril daily for blood pressure, however, he has missed a couple of doses over the past few days. Patient denies hematuria, dysuria, frequency, emesis, diarrhea or fevers.     Independent Historian   None    Review of External Notes   I reviewed Care everywhere and updated Epic.     Past Medical History     Medical History and Problem List   Past Medical History:   Diagnosis Date    Cholelithiasis     Depressive disorder     Diabetes mellitus, type II     Gastroesophageal reflux disease     Hypertension     Hypertriglyceridemia        Medications   Glucagon (GVOKE HYPOPEN) 1 MG/0.2ML pen  insulin aspart (NOVOLOG FLEXPEN) 100 UNIT/ML pen  insulin glargine 100 UNIT/ML pen  insulin lispro (HUMALOG KWIKPEN) 100 UNIT/ML (1 unit dial) KWIKPEN  lisinopril (ZESTRIL) 20 MG tablet        Surgical History   Past Surgical History:   Procedure Laterality Date    Scrotum procedure      TONSILLECTOMY, ADENOIDECTOMY, COMBINED         Physical Exam     Patient Vitals for the past 24 hrs:   BP Temp Temp src Pulse Resp SpO2   02/13/25 0233 185/103 -- -- 78 -- 100 %   02/13/25 0137  -- -- -- -- 97 %   02/13/25 0136  200/110 -- -- 79 -- --   02/13/25 0113  180/126 -- -- 92 -- 99 %   02/13/25 0043  192/107 98.1  F (36.7  C) Oral 81 18 100 %       Physical Exam  Nursing note and vitals  reviewed.  Constitutional: Cooperative.  Uncomfortable appearing  HENT:   Mouth/Throat: Mucous membranes are normal.   Cardiovascular: Normal rate, regular rhythm and normal heart sounds.  No murmur.  Pulmonary/Chest: Effort normal and breath sounds normal. No respiratory distress. No wheezes. No rales.   Abdominal: Soft. Normal appearance and bowel sounds are normal. No distension. There is no tenderness. There is no rigidity and no guarding.   Musculoskeletal: Tenderness to right posterior inferior ribs and mild right lower quadrant pain.   Neurological: Alert.  Oriented x 3  Skin: Skin is warm and dry.   Psychiatric: Normal mood and affect.        Diagnostics     Lab Results   Labs Ordered and Resulted from Time of ED Arrival to Time of ED Departure   COMPREHENSIVE METABOLIC PANEL - Abnormal       Result Value    Sodium 140      Potassium 4.0      Carbon Dioxide (CO2) 20 (*)     Anion Gap 11      Urea Nitrogen 24.9 (*)     Creatinine 1.47 (*)     GFR Estimate 65      Calcium 9.4      Chloride 109 (*)     Glucose 205 (*)     Alkaline Phosphatase 130      AST 15      ALT 17      Protein Total 6.5      Albumin 4.4      Bilirubin Total 0.2     ROUTINE UA WITH MICROSCOPIC - Abnormal    Color Urine Light Yellow      Appearance Urine Clear      Glucose Urine 150 (*)     Bilirubin Urine Negative      Ketones Urine Negative      Specific Gravity Urine 1.013      Blood Urine Large (*)     pH Urine 6.0      Protein Albumin Urine 100 (*)     Urobilinogen Urine Normal      Nitrite Urine Negative      Leukocyte Esterase Urine Negative      Mucus Urine Present (*)     RBC Urine 162 (*)     WBC Urine 4     CBC WITH PLATELETS AND DIFFERENTIAL - Abnormal    WBC Count 14.0 (*)     RBC Count 4.04 (*)     Hemoglobin 12.4 (*)     Hematocrit 35.0 (*)     MCV 87      MCH 30.7      MCHC 35.4      RDW 12.7      Platelet Count 222      % Neutrophils 81      % Lymphocytes 11      % Monocytes 6      % Eosinophils 2      % Basophils 1      %  Immature Granulocytes 0      NRBCs per 100 WBC 0      Absolute Neutrophils 11.3 (*)     Absolute Lymphocytes 1.5      Absolute Monocytes 0.8      Absolute Eosinophils 0.2      Absolute Basophils 0.1      Absolute Immature Granulocytes 0.1      Absolute NRBCs 0.0         Imaging   CT Lumbar Spine w/o Contrast   Final Result   IMPRESSION:   1.  No evidence of acute lumbar spine fracture.   2.  Good anatomic alignment and vertebral body heights maintained.   3.  No significant canal compromise or neural foraminal narrowing throughout lumbar region.         CT Abdomen Pelvis without Contrast (stone protocol)   Preliminary Result   IMPRESSION:    1.  Obstructing stones distal right ureter with at least 3 separate stones seen measuring up to 5 mm. These result in mild right hydronephrosis.      2.  Numerous nonobstructing intrarenal calculi bilaterally, as above.      3.  Cholelithiasis. No biliary dilatation.      4.  No acute bowel findings. Normal appendix.           Independent Interpretation   None    ED Course      Medications Administered   Medications   ketorolac (TORADOL) injection 10 mg (10 mg Intravenous $Given 2/13/25 0136)   sodium chloride 0.9% BOLUS 1,000 mL (0 mLs Intravenous Stopped 2/13/25 0229)   morphine (PF) injection 4 mg (4 mg Intravenous $Given 2/13/25 0137)   tamsulosin (FLOMAX) capsule 0.4 mg (0.4 mg Oral $Given 2/13/25 0226)     Discussion of Management   None    ED Course   ED Course as of 02/13/25 0227   Thu Feb 13, 2025   0112 I obtained the history and examined the patient as noted above.     0224 I rechecked the patient and explained findings; patient is feeling better.      Additional Documentation  None    Medical Decision Making / Diagnosis     ZEYAD   Ubaldobijal ferraro is a 34 year old male presenting with unilateral flank pain consistent with renal colic. Ct confirms a ureteral stones as noted above. Pain is controlled with interventions in the emergency department. There is no fever or  evidence of a urinary tract infection. The patient will be discharged with opioid analgesics and ibuprofen for pain. Flomax will be prescribed daily to attempt to ease stone passage.   Zofran was prescribed for nausea.  I considered other etiologies for these symptoms including AAA and pyelonephritis but these are unlikely given the otherwise normal ct scan and urinalysis.  The patient is instructed to return if increasing pain not controlled with pain meds, vomiting, and fever. Strain urine to look for stone, if detected, submit to primary doctor for lab analysis. Instructions were given to follow up with urology within one week, sooner if pain continues, as retrieval of the stone may be required for refractory symptoms.  Lumbar spine CT reassuring.  This was sent to rule out fracture given his fall but given the presence of obstructing stones I am comfortable ruling out traumatic injury.  Blood pressure was noted to be markedly elevated.  This is likely multifactorial given his current obstructing kidney stones and combination with lack of taking blood pressure medication.  He was encouraged to be back on his blood pressure medication when he is home.    Disposition   The patient was discharged.     Diagnosis     ICD-10-CM   1. Renal colic on right side  N23      2. Ureterolithiasis  N20.1      3. Elevated serum creatinine  R79.89      4. Elevated blood pressure reading  R03.0           Discharge Medications   Discharge Medication List as of 2/13/2025  2:29 AM        START taking these medications    Details   ondansetron (ZOFRAN ODT) 4 MG ODT tab Take 1 tablet (4 mg) by mouth every 6 hours as needed for nausea., Disp-10 tablet, R-0, Local Print      oxyCODONE (ROXICODONE) 5 MG tablet Take 1 tablet (5 mg) by mouth every 6 hours as needed for severe pain., Disp-12 tablet, R-0, Local Print      tamsulosin (FLOMAX) 0.4 MG capsule Take 1 capsule (0.4 mg) by mouth daily., Disp-7 capsule, R-0, Local Print              Scribe Disclosure:  I, Cat Saunders, am serving as a scribe at 1:18 AM on 2/13/2025 to document services personally performed by Rodolfo Leo MD based on my observations and the provider's statements to me.        Rodolfo Leo MD  02/13/25 0252

## 2025-02-13 NOTE — ED TRIAGE NOTES
Pt arrives with lower back pain radiating into his abdomen, nausea, and some numbness in his R hand fingers. Took ibuprofen 800mg 4-5  hours ago with no relief. Had a fall at work on Sunday. Fell flat on his back. Denies hitting head, no LOC. Denies any other symptoms. A/O.

## 2025-04-24 ENCOUNTER — APPOINTMENT (OUTPATIENT)
Dept: CT IMAGING | Facility: CLINIC | Age: 35
End: 2025-04-24

## 2025-04-24 ENCOUNTER — HOSPITAL ENCOUNTER (EMERGENCY)
Facility: CLINIC | Age: 35
Discharge: HOME | End: 2025-04-24

## 2025-04-24 VITALS
OXYGEN SATURATION: 100 % | TEMPERATURE: 97.6 F | HEIGHT: 66 IN | BODY MASS INDEX: 30.51 KG/M2 | HEART RATE: 85 BPM | DIASTOLIC BLOOD PRESSURE: 98 MMHG | WEIGHT: 189.82 LBS | RESPIRATION RATE: 18 BRPM | SYSTOLIC BLOOD PRESSURE: 157 MMHG

## 2025-04-24 DIAGNOSIS — D72.829 LEUKOCYTOSIS: ICD-10-CM

## 2025-04-24 DIAGNOSIS — R79.89 ELEVATED SERUM CREATININE: ICD-10-CM

## 2025-04-24 DIAGNOSIS — R03.0 ELEVATED BLOOD PRESSURE READING: ICD-10-CM

## 2025-04-24 DIAGNOSIS — N20.0 RIGHT KIDNEY STONE: ICD-10-CM

## 2025-04-24 LAB
ALBUMIN UR-MCNC: 100 MG/DL
ANION GAP SERPL CALCULATED.3IONS-SCNC: 10 MMOL/L (ref 7–15)
APPEARANCE UR: CLEAR
BASOPHILS # BLD AUTO: 0.1 10E3/UL (ref 0–0.2)
BASOPHILS NFR BLD AUTO: 1 %
BILIRUB UR QL STRIP: NEGATIVE
BUN SERPL-MCNC: 32.4 MG/DL (ref 6–20)
CALCIUM SERPL-MCNC: 9.1 MG/DL (ref 8.8–10.4)
CHLORIDE SERPL-SCNC: 108 MMOL/L (ref 98–107)
COLOR UR AUTO: ABNORMAL
CREAT SERPL-MCNC: 1.42 MG/DL (ref 0.67–1.17)
EGFRCR SERPLBLD CKD-EPI 2021: 67 ML/MIN/1.73M2
EOSINOPHIL # BLD AUTO: 0.4 10E3/UL (ref 0–0.7)
EOSINOPHIL NFR BLD AUTO: 3 %
ERYTHROCYTE [DISTWIDTH] IN BLOOD BY AUTOMATED COUNT: 12.4 % (ref 10–15)
GLUCOSE SERPL-MCNC: 257 MG/DL (ref 70–99)
GLUCOSE UR STRIP-MCNC: 200 MG/DL
HCO3 SERPL-SCNC: 22 MMOL/L (ref 22–29)
HCT VFR BLD AUTO: 36.8 % (ref 40–53)
HGB BLD-MCNC: 12.9 G/DL (ref 13.3–17.7)
HGB UR QL STRIP: ABNORMAL
HOLD SPECIMEN: NORMAL
IMM GRANULOCYTES # BLD: 0 10E3/UL
IMM GRANULOCYTES NFR BLD: 0 %
KETONES UR STRIP-MCNC: NEGATIVE MG/DL
LEUKOCYTE ESTERASE UR QL STRIP: NEGATIVE
LYMPHOCYTES # BLD AUTO: 2 10E3/UL (ref 0.8–5.3)
LYMPHOCYTES NFR BLD AUTO: 16 %
MCH RBC QN AUTO: 30.2 PG (ref 26.5–33)
MCHC RBC AUTO-ENTMCNC: 35.1 G/DL (ref 31.5–36.5)
MCV RBC AUTO: 86 FL (ref 78–100)
MONOCYTES # BLD AUTO: 1 10E3/UL (ref 0–1.3)
MONOCYTES NFR BLD AUTO: 8 %
MUCOUS THREADS #/AREA URNS LPF: PRESENT /LPF
NEUTROPHILS # BLD AUTO: 8.7 10E3/UL (ref 1.6–8.3)
NEUTROPHILS NFR BLD AUTO: 72 %
NITRATE UR QL: NEGATIVE
NRBC # BLD AUTO: 0 10E3/UL
NRBC BLD AUTO-RTO: 0 /100
PH UR STRIP: 6 [PH] (ref 5–7)
PLATELET # BLD AUTO: 218 10E3/UL (ref 150–450)
POTASSIUM SERPL-SCNC: 3.8 MMOL/L (ref 3.4–5.3)
RBC # BLD AUTO: 4.27 10E6/UL (ref 4.4–5.9)
RBC URINE: 61 /HPF
SODIUM SERPL-SCNC: 140 MMOL/L (ref 135–145)
SP GR UR STRIP: 1.02 (ref 1–1.03)
UROBILINOGEN UR STRIP-MCNC: NORMAL MG/DL
WBC # BLD AUTO: 12.1 10E3/UL (ref 4–11)
WBC URINE: 2 /HPF

## 2025-04-24 PROCEDURE — 74176 CT ABD & PELVIS W/O CONTRAST: CPT

## 2025-04-24 PROCEDURE — 81003 URINALYSIS AUTO W/O SCOPE: CPT

## 2025-04-24 PROCEDURE — 99284 EMERGENCY DEPT VISIT MOD MDM: CPT | Mod: 25

## 2025-04-24 PROCEDURE — 250N000011 HC RX IP 250 OP 636

## 2025-04-24 PROCEDURE — 258N000003 HC RX IP 258 OP 636

## 2025-04-24 PROCEDURE — 96360 HYDRATION IV INFUSION INIT: CPT

## 2025-04-24 PROCEDURE — 80048 BASIC METABOLIC PNL TOTAL CA: CPT

## 2025-04-24 PROCEDURE — 250N000013 HC RX MED GY IP 250 OP 250 PS 637

## 2025-04-24 PROCEDURE — 36415 COLL VENOUS BLD VENIPUNCTURE: CPT

## 2025-04-24 PROCEDURE — 85004 AUTOMATED DIFF WBC COUNT: CPT

## 2025-04-24 RX ORDER — OXYCODONE HYDROCHLORIDE 5 MG/1
5 TABLET ORAL ONCE
Status: COMPLETED | OUTPATIENT
Start: 2025-04-24 | End: 2025-04-24

## 2025-04-24 RX ORDER — OXYCODONE HYDROCHLORIDE 5 MG/1
5 TABLET ORAL EVERY 6 HOURS PRN
Qty: 12 TABLET | Refills: 0 | Status: SHIPPED | OUTPATIENT
Start: 2025-04-24 | End: 2025-04-27

## 2025-04-24 RX ORDER — ONDANSETRON 4 MG/1
4 TABLET, ORALLY DISINTEGRATING ORAL EVERY 8 HOURS PRN
Qty: 10 TABLET | Refills: 0 | Status: SHIPPED | OUTPATIENT
Start: 2025-04-24 | End: 2025-04-27

## 2025-04-24 RX ORDER — ONDANSETRON 4 MG/1
4 TABLET, ORALLY DISINTEGRATING ORAL ONCE
Status: COMPLETED | OUTPATIENT
Start: 2025-04-24 | End: 2025-04-24

## 2025-04-24 RX ORDER — TAMSULOSIN HYDROCHLORIDE 0.4 MG/1
0.4 CAPSULE ORAL ONCE
Status: COMPLETED | OUTPATIENT
Start: 2025-04-24 | End: 2025-04-24

## 2025-04-24 RX ORDER — LISINOPRIL 10 MG/1
20 TABLET ORAL ONCE
Status: COMPLETED | OUTPATIENT
Start: 2025-04-24 | End: 2025-04-24

## 2025-04-24 RX ORDER — IBUPROFEN 600 MG/1
600 TABLET, FILM COATED ORAL EVERY 6 HOURS PRN
Qty: 60 TABLET | Refills: 0 | Status: SHIPPED | OUTPATIENT
Start: 2025-04-24

## 2025-04-24 RX ORDER — TAMSULOSIN HYDROCHLORIDE 0.4 MG/1
0.4 CAPSULE ORAL DAILY
Qty: 10 CAPSULE | Refills: 0 | Status: SHIPPED | OUTPATIENT
Start: 2025-04-24 | End: 2025-05-04

## 2025-04-24 RX ADMIN — ONDANSETRON 4 MG: 4 TABLET, ORALLY DISINTEGRATING ORAL at 09:55

## 2025-04-24 RX ADMIN — SODIUM CHLORIDE 1000 ML: 9 INJECTION, SOLUTION INTRAVENOUS at 10:05

## 2025-04-24 RX ADMIN — OXYCODONE HYDROCHLORIDE 5 MG: 5 TABLET ORAL at 11:15

## 2025-04-24 RX ADMIN — OXYCODONE HYDROCHLORIDE 5 MG: 5 TABLET ORAL at 09:55

## 2025-04-24 RX ADMIN — TAMSULOSIN HYDROCHLORIDE 0.4 MG: 0.4 CAPSULE ORAL at 09:55

## 2025-04-24 RX ADMIN — LISINOPRIL 20 MG: 10 TABLET ORAL at 11:15

## 2025-04-24 ASSESSMENT — COLUMBIA-SUICIDE SEVERITY RATING SCALE - C-SSRS
2. HAVE YOU ACTUALLY HAD ANY THOUGHTS OF KILLING YOURSELF IN THE PAST MONTH?: NO
1. IN THE PAST MONTH, HAVE YOU WISHED YOU WERE DEAD OR WISHED YOU COULD GO TO SLEEP AND NOT WAKE UP?: NO
6. HAVE YOU EVER DONE ANYTHING, STARTED TO DO ANYTHING, OR PREPARED TO DO ANYTHING TO END YOUR LIFE?: NO

## 2025-04-24 ASSESSMENT — ACTIVITIES OF DAILY LIVING (ADL)
ADLS_ACUITY_SCORE: 53

## 2025-04-24 NOTE — ED PROVIDER NOTES
"  Emergency Department Note      History of Present Illness     Chief Complaint   Flank Pain      HPI   Ubaldo Zamora is a 34 year old male with history of type 2 diabetes, hypertension, hyperlipidemia, kidney stone who presents to the ED for flank pain. The patient reports that he developed right flank this morning around 0700 as he was starting his day. He states that the pain wraps around into his abdomen and radiates in to his groin. He tried taking 3 tablets of ibuprofen this morning which provided no relief. He adds that his pain feels similar to the kidney stone he had in the past. He also endorses some mild nausea. Denies fever, chills, vomiting, hematuria, dysuria, and urinary frequency. The patient notes that he has never undergone surgery for his last kidney stone and passed it on his own.      Independent Historian   None    Review of External Notes   I reviewed the ED note from 2/13/25.     Past Medical History     Medical History and Problem List   Type 2 diabetes mellitus   Depression  Hypertension  Hyperlipidemia  Tobacco abuse  Kidney stone  Gall stones  GERD    Medications   Insulin aspart  Insulin glargine   Lisinopril  Flomax   Glucagon     Surgical History   Tonsillectomy   Adenoidectomy     Physical Exam     Patient Vitals for the past 24 hrs:   BP Temp Temp src Pulse Resp SpO2 Height Weight   04/24/25 1300 (!) 157/98 -- -- -- -- -- -- --   04/24/25 1108 (!) 201/110 -- -- 85 18 100 % -- --   04/24/25 1009 -- -- -- -- -- 100 % -- --   04/24/25 1007 (!) 200/104 -- -- 73 -- -- -- --   04/24/25 0920 (!) 202/110 97.6  F (36.4  C) Temporal 87 16 98 % 1.676 m (5' 6\") 86.1 kg (189 lb 13.1 oz)     Physical Exam  General:  No acute distress.    Head: Atraumatic.    ENT: Moist mucus membranes.    Cardiovascular:  Regular rate and rhythm.  Lungs:  Appropriate oxygen saturations on room air.  Normal work of breathing.  Lungs clear to auscultation bilaterally.    GI:  Abdomen soft and nontender.  No " rebound or rigidity. Right CVA tenderness.  Neurologic: Awake, alert, and conversant.  No focal neurologic deficits.  Psychiatric: Normal mood and affect.    Diagnostics     Lab Results   Labs Ordered and Resulted from Time of ED Arrival to Time of ED Departure   BASIC METABOLIC PANEL - Abnormal       Result Value    Sodium 140      Potassium 3.8      Chloride 108 (*)     Carbon Dioxide (CO2) 22      Anion Gap 10      Urea Nitrogen 32.4 (*)     Creatinine 1.42 (*)     GFR Estimate 67      Calcium 9.1      Glucose 257 (*)    ROUTINE UA WITH MICROSCOPIC REFLEX TO CULTURE - Abnormal    Color Urine Light Yellow      Appearance Urine Clear      Glucose Urine 200 (*)     Bilirubin Urine Negative      Ketones Urine Negative      Specific Gravity Urine 1.016      Blood Urine Moderate (*)     pH Urine 6.0      Protein Albumin Urine 100 (*)     Urobilinogen Urine Normal      Nitrite Urine Negative      Leukocyte Esterase Urine Negative      Mucus Urine Present (*)     RBC Urine 61 (*)     WBC Urine 2     CBC WITH PLATELETS AND DIFFERENTIAL - Abnormal    WBC Count 12.1 (*)     RBC Count 4.27 (*)     Hemoglobin 12.9 (*)     Hematocrit 36.8 (*)     MCV 86      MCH 30.2      MCHC 35.1      RDW 12.4      Platelet Count 218      % Neutrophils 72      % Lymphocytes 16      % Monocytes 8      % Eosinophils 3      % Basophils 1      % Immature Granulocytes 0      NRBCs per 100 WBC 0      Absolute Neutrophils 8.7 (*)     Absolute Lymphocytes 2.0      Absolute Monocytes 1.0      Absolute Eosinophils 0.4      Absolute Basophils 0.1      Absolute Immature Granulocytes 0.0      Absolute NRBCs 0.0         Imaging   Abd/pelvis CT no contrast - Stone Protocol   Final Result   IMPRESSION:    1.  5 mm obstructing calculus at the right ureterovesical junction, with mild right hydronephrosis.   2.  Multiple additional nonobstructing calculi bilaterally measuring up to 1 cm.                         Independent Interpretation   None    ED Course       Medications Administered   Medications   sodium chloride 0.9% BOLUS 1,000 mL (0 mLs Intravenous Stopped 4/24/25 1107)   oxyCODONE (ROXICODONE) tablet 5 mg (5 mg Oral $Given 4/24/25 0955)   ondansetron (ZOFRAN ODT) ODT tab 4 mg (4 mg Oral $Given 4/24/25 0955)   tamsulosin (FLOMAX) capsule 0.4 mg (0.4 mg Oral $Given 4/24/25 0955)   lisinopril (ZESTRIL) tablet 20 mg (20 mg Oral $Given 4/24/25 1115)   oxyCODONE (ROXICODONE) tablet 5 mg (5 mg Oral $Given 4/24/25 1115)       Procedures   Procedures     Discussion of Management   None    ED Course   ED Course as of 04/24/25 1516   Thu Apr 24, 2025   0939 I obtained history and performed physical exam.    1113 Initially hopeful to hold off on pursuing CT; however, due to severe persistent pain, decided to pursue today.       Additional Documentation  None    Medical Decision Making / Diagnosis   CMS Diagnoses: None    MIPS   None    MDM   Ubaldo Zamora is a 34 year old male who presents complaining of right flank pain radiating to the right lower quadrant.  Patient reports pain feels similar to past renal colic. Evaluation today consistent with nephrolithiasis and renal colic. Based on history and exam and the above studies, I do not feel that there is evidence of other acute intraabdominal process at this time.  CT scan showed an obstructing stone at the right UVJ with multiple nonobstructing kidney stones bilaterally. No VINOD, baseline elevated creatinine. UA was obtained and no overt signs of infection are present.  Given the presence of kidney stone, urine culture sent.  The patient received the above interventions and felt much improved and appropriate for discharge home.  Patient be discharged home with ibuprofen, oxycodone for breakthrough pain, Zofran for nausea, and Flomax to facilitate stone passage.  Based on the patient's good response to symptomatic control, I feel that this patient can be managed expectantly with close outpatient follow up within the next  "several days.  The patient was instructed to return to the emergency department for uncontrolled pain, fever or inability to tolerate oral liquids.  Patient agrees with the plan and all questions and concerns addressed prior to discharge home.      Disposition   The patient was discharged.     Diagnosis     ICD-10-CM    1. Right kidney stone  N20.0 Adult Urology  Referral      2. Elevated serum creatinine  R79.89       3. Elevated blood pressure reading  R03.0       4. Leukocytosis  D72.829            Discharge Medications   Discharge Medication List as of 4/24/2025  1:08 PM        START taking these medications    Details   ibuprofen (ADVIL/MOTRIN) 600 MG tablet Take 1 tablet (600 mg) by mouth every 6 hours as needed., Disp-60 tablet, R-0, E-Prescribe      Misc. Devices (STRAINER/STAINLESS STEEL/2.5\") MISC 1 each once for 1 dose., Disp-1 each, R-0, E-Prescribe      ondansetron (ZOFRAN ODT) 4 MG ODT tab Take 1 tablet (4 mg) by mouth every 8 hours as needed for nausea., Disp-10 tablet, R-0, E-Prescribe      !! oxyCODONE (ROXICODONE) 5 MG tablet Take 1 tablet (5 mg) by mouth every 6 hours as needed for pain., Disp-12 tablet, R-0, Local Print      !! tamsulosin (FLOMAX) 0.4 MG capsule Take 1 capsule (0.4 mg) by mouth daily for 10 doses., Disp-10 capsule, R-0, E-Prescribe       !! - Potential duplicate medications found. Please discuss with provider.            Scribe Disclosure:  Andrea DOTY, am serving as a scribe at 9:30 AM on 4/24/2025 to document services personally performed by Teresa Thacker PA-C based on my observations and the provider's statements to me.        Teresa Thacker PA-C  04/24/25 5939    "

## 2025-04-24 NOTE — DISCHARGE INSTRUCTIONS
Ibuprofen 600 mg every 6 hours for pain    Zofran every 8 hours as needed for nausea    Flomax daily for urinary stream    Oxycodone 1-2 tablets every 6 hours as needed for pain    Return for fevers, uncontrolled pain    Strain urine to ensure clearance    Follow up with Urology

## 2025-04-24 NOTE — ED TRIAGE NOTES
Pt arrives ambulatory by self for right flank pain. Began this morning around 7am. Took ibuprofen 3 tablets this morning. Denies urinary symptoms. History of kidney stones couple months ago.

## 2025-04-28 ENCOUNTER — PATIENT OUTREACH (OUTPATIENT)
Dept: CARE COORDINATION | Facility: CLINIC | Age: 35
End: 2025-04-28

## 2025-04-28 NOTE — PROGRESS NOTES
Clinic Care Coordination Contact  Community Health Worker Initial Outreach    CHW Initial Information Gathering:  Referral Source: ED Follow-Up  Current living arrangement:: Not Assessed  CHW Additional Questions  If ED/Hospital discharge, follow-up appointment scheduled as recommended?: Yes  Medication changes made following ED/Hospital discharge?: No  MyChart active?: Yes  Patient sent Social Drivers of Health questionnaire?: No    Patient accepts CC: No, Patient expressed no additional support is needed at this time. Patient will be sent Care Coordination introduction letter for future reference.     Letty Saldaña  Community Health Worker    Connected Care Resources   [facility]     *Connected Care Resources Team does NOT   follow patient ongoing. Referrals are identified   based on internal discharge report and the outreach is to ensure   Patient has understanding of their discharge instructions.

## 2025-04-28 NOTE — LETTER
Ubaldo Zamora  [address] [facility] [address] [facility] [address]    Dear Ubaldo Zamora,      I am a team member within the Connected Care Resource Center with [facility]. I recently tried to reach you to ensure you were doing well following a recent visit within our health system. I also wanted to take this chance to introduce Clinic Care Coordination.     Below is a description of Clinic Care Coordination and how this team can further assist you:       The Clinic Care Coordination team is made up of a Registered Nurse, , Financial Resource Worker, and a Community Health Worker who understand and can help navigate the health care system. The goal of clinic care coordination is to help you manage your health, improve access to care, and achieve optimal health outcomes. They work alongside your provider to assist you in determining your health and social needs, obtain health care and community resources, and provide you with necessary information and education. Clinic Care Coordination can work with you through any barriers and develop a care plan that helps coordinate and strengthen the relationship between you and your care team.    If you wish to connect with the Clinic Care Coordination Team, please let your [facility] Primary Care Provider or Clinic Care Team know and they can place a referral. The Clinic Care Coordination team will then reach out by phone to further support you.    We are focused on providing you with the highest-quality healthcare experience possible.    Sincerely,   Letty MORRIS  Community Health Worker    Connected Care Resources   [facility] [address] ([phone]).

## 2025-05-05 DIAGNOSIS — E11.628 TYPE 2 DIABETES MELLITUS WITH OTHER SKIN COMPLICATION, WITHOUT LONG-TERM CURRENT USE OF INSULIN (H): Primary | ICD-10-CM

## 2025-05-06 RX ORDER — INSULIN GLARGINE 100 [IU]/ML
27 INJECTION, SOLUTION SUBCUTANEOUS DAILY
Qty: 15 ML | Refills: 0 | Status: SHIPPED | OUTPATIENT
Start: 2025-05-06

## 2025-05-06 NOTE — TELEPHONE ENCOUNTER
Requested Prescriptions   Pending Prescriptions Disp Refills    insulin glargine 100 UNIT/ML pen 15 mL      Sig: Inject 27 Units subcutaneously daily.       Insulin Protocol Failed - 5/6/2025 12:47 PM        Failed - HgbA1C in past 3 or 6 months     If HgbA1C is 8 or greater, it needs to be on file within the past 3 months.  If less than 8, must be on file within the past 6 months.     Recent Labs   Lab Test 10/16/23  1524   A1C 6.3*             Failed - Recent (6 mo) or future (90 days) visit within the authorizing provider's specialty     The patient must have completed an in-person or virtual visit within the past 6 months or has a future visit scheduled within the next 90 days with the authorizing provider s specialty.  Urgent care and e-visits do not quality as an office visit for this protocol.          Failed - Medication indicated for associated diagnosis     Medication is associated with one or more of the following diagnoses:   - Type 1 diabetes mellitus  - Type 2 diabetes mellitus  - Diabetic nephropathy; Prophylaxis  - Neuropathy due to diabetes mellitus; Prophylaxis  - Retinopathy due to diabetes mellitus; Prophylaxis  - Diabetes mellitus associated with cystic fibrosis  - Disorder of cardiovascular system; Prophylaxis - Type 1 diabetes mellitus   - Disorder of cardiovascular system; Prophylaxis - Type 2 diabetes mellitus            Failed - Chart review required     Review Chart.    Do not approve if insulin is used in a pump.  Instead, direct refill request to the patient's endocrinologist.  If the patient doesn't have an endocrinologist, then send the refill to the patient's PCP for review            Passed - Medication is active on med list and the sig matches. RN to manually verify dose and sig if red X/fail.     If the protocol passes (green check), you do not need to verify med dose and sig.    A prescription matches if they are the same clinical intention.    For Example: once daily and every  morning are the same.    The protocol can not identify upper and lower case letters as matching and will fail.     For Example: Take 1 tablet (50 mg) by mouth daily     TAKE 1 TABLET (50 MG) BY MOUTH DAILY    For all fails (red x), verify dose and sig.    If the refill does match what is on file, the RN can still proceed to approve the refill request.       If they do not match, route to the appropriate provider.             Passed - Patient is 18 years of age or older

## 2025-06-10 ENCOUNTER — OFFICE VISIT (OUTPATIENT)
Dept: ENDOCRINOLOGY | Facility: CLINIC | Age: 35
End: 2025-06-10

## 2025-06-10 VITALS
RESPIRATION RATE: 16 BRPM | SYSTOLIC BLOOD PRESSURE: 168 MMHG | TEMPERATURE: 96.7 F | BODY MASS INDEX: 29.97 KG/M2 | HEART RATE: 75 BPM | DIASTOLIC BLOOD PRESSURE: 95 MMHG | WEIGHT: 186.5 LBS | OXYGEN SATURATION: 100 % | HEIGHT: 66 IN

## 2025-06-10 DIAGNOSIS — E11.65 TYPE 2 DIABETES MELLITUS WITH HYPERGLYCEMIA, WITH LONG-TERM CURRENT USE OF INSULIN (H): Primary | ICD-10-CM

## 2025-06-10 DIAGNOSIS — E11.628 TYPE 2 DIABETES MELLITUS WITH OTHER SKIN COMPLICATION, WITHOUT LONG-TERM CURRENT USE OF INSULIN (H): ICD-10-CM

## 2025-06-10 DIAGNOSIS — Z79.4 TYPE 2 DIABETES MELLITUS WITH HYPERGLYCEMIA, WITH LONG-TERM CURRENT USE OF INSULIN (H): Primary | ICD-10-CM

## 2025-06-10 LAB
ANION GAP SERPL CALCULATED.3IONS-SCNC: 9 MMOL/L (ref 7–15)
BUN SERPL-MCNC: 27.2 MG/DL (ref 6–20)
CALCIUM SERPL-MCNC: 9.3 MG/DL (ref 8.8–10.4)
CHLORIDE SERPL-SCNC: 111 MMOL/L (ref 98–107)
CREAT SERPL-MCNC: 1.8 MG/DL (ref 0.67–1.17)
EGFRCR SERPLBLD CKD-EPI 2021: 51 ML/MIN/1.73M2
EST. AVERAGE GLUCOSE BLD GHB EST-MCNC: 157 MG/DL
GLUCOSE SERPL-MCNC: 128 MG/DL (ref 70–99)
HBA1C MFR BLD: 7.1 % (ref 0–5.6)
HCO3 SERPL-SCNC: 24 MMOL/L (ref 22–29)
POTASSIUM SERPL-SCNC: 3.8 MMOL/L (ref 3.4–5.3)
SODIUM SERPL-SCNC: 144 MMOL/L (ref 135–145)

## 2025-06-10 PROCEDURE — 80048 BASIC METABOLIC PNL TOTAL CA: CPT | Performed by: PHYSICIAN ASSISTANT

## 2025-06-10 PROCEDURE — 36415 COLL VENOUS BLD VENIPUNCTURE: CPT | Performed by: PHYSICIAN ASSISTANT

## 2025-06-10 PROCEDURE — 99214 OFFICE O/P EST MOD 30 MIN: CPT | Performed by: PHYSICIAN ASSISTANT

## 2025-06-10 PROCEDURE — 83036 HEMOGLOBIN GLYCOSYLATED A1C: CPT | Performed by: PHYSICIAN ASSISTANT

## 2025-06-10 RX ORDER — INSULIN LISPRO 100 [IU]/ML
INJECTION, SOLUTION INTRAVENOUS; SUBCUTANEOUS
Qty: 15 ML | Refills: 0 | Status: SHIPPED | OUTPATIENT
Start: 2025-06-10

## 2025-06-10 RX ORDER — LISINOPRIL 20 MG/1
20 TABLET ORAL DAILY
Qty: 90 TABLET | Refills: 0 | Status: SHIPPED | OUTPATIENT
Start: 2025-06-10

## 2025-06-10 NOTE — PATIENT INSTRUCTIONS
[facility]  Dr Garcia, Endocrinology Department    [address] [id number]  [place] [address]  Appointment [phone]  Fax: [phone]  [place]: Monday - Thursday

## 2025-06-10 NOTE — LETTER
6/10/2025      Ubaldo Zamora  [id number] [facility] [address]      Dear Colleague,    Thank you for referring your patient, Ubaldo Zamora, to the [facility]. Please see a copy of my visit note below.    Assessment/Plan :   Type 2 DM. Ubaldo has been working hard to get his blood sugars under better control. His previous hemoglobin A1C had improved to 6.3%. He has continued to work on diet and exercise and he is feeling really good. His energy level is up and he has not had any problems with severe hyperglycemia and/or hypoglycemia. I encouraged him to keep up the good work. We will repeat a hemoglobin A1C today and we will follow-up in 6 mos.  HTN. Ubaldo's blood pressure is elevated today. He has had a few elevated readings, recently. These were around the time that he was in pain due to kidney stones. He has been taking lisinopril every evening. He did run out of the pills a couple days ago. I will send in a refill today. We will also check a basic metabolic panel today. I will contact him with the results.       I have independently reviewed and interpreted labs, imaging as indicated.      Chief complaint:  Ubaldo is a 34 year old male who returns for follow-up of type 2 diabetes.     I have reviewed Care Everywhere including [facility], [facility], [facility] , [facility], [place] lab reports, imaging reports and provider notes as indicated.      HISTORY OF PRESENT ILLNESS  Connor is doing well. He has been working hard to improve his diet and exercise. He was recently in the hospital with kidney stones. They explained that his diet and drinking soda can contribute to the development of stones, so he has been working hard to cut out all soda. He has focused on drinking more water and making healthy dietary decisions. He has noticed an improvement in his blood sugars and he is feeling much better. He has continued to take 27 unit(s)  of Basaglar daily along with insulin lispro before meals based on an insulin to carb ratio of 1 unit(s) for every 15 grams of carbs.    Connor has been using the FreeStyle Laura sensor to monitor his blood sugars daily. He recently had a sensor fail, so he has returned to fingerstick testing. He has been working hard to keep his blood sugars under 250 mg/dl. He has not had any recent episodes of severe hyperglycemia and/or hypoglycemia. He has noticed that the vision in his right eye has gotten blurrier. He is overdue for an eye exam. He has also had some numbness in his hands. He is not sure if this is diabetes related.     Connor was diagnosed with diabetes at the age of 18. He was started on metformin at the time of diagnosis. This worked well for a few years but over time his blood sugars started to increase and he was started on insulin. He has struggled with taking insulin over the years. Partly due to cost. He would like to look into options regarding insulin pump therapy but he cannot afford an insulin pump at present time. His diabetes is complicated by hyperlipidemia, HTN, DPN, and a history of acute pancreatitis and cholecystitis.     Endocrine relevant labs are as follows:   Latest Reference Range & Units 10/16/23 15:24   Hemoglobin A1C 0.0 - 5.6 % 6.3 (H)   (H): Data is abnormally high   Latest Reference Range & Units 07/15/23 05:50   Hemoglobin A1C <5.7 % 14.6 (H)   (H): Data is abnormally high   Latest Reference Range & Units 07/15/23 06:51   Albumin Urine mg/g Cr 0.00 - 17.00 mg/g Cr 726.32 (H)   (H): Data is abnormally high   Latest Reference Range & Units 07/15/23 06:51   Albumin Urine mg/L mg/L 276.0     REVIEW OF SYSTEMS    10 system ROS otherwise as per the HPI or negative    Past Medical History  Past Medical History:   Diagnosis Date     Cholelithiasis      Depressive disorder      Diabetes mellitus, type II      Gastroesophageal reflux disease      Hypertension      Hypertriglyceridemia       Kidney stone        Medications  Current Outpatient Medications   Medication Sig Dispense Refill     Glucagon (GVOKE HYPOPEN) 1 MG/0.2ML pen Inject the contents of 1 device under the skin into lower abdomen, outer thigh, or outer upper arm as needed for hypoglycemia. If no response after 15 minutes, additional 1 mg dose from a new device may be injected while waiting for emergency assistance. 0.4 mL 1     ibuprofen (ADVIL/MOTRIN) 600 MG tablet Take 1 tablet (600 mg) by mouth every 6 hours as needed. 60 tablet 0     insulin aspart (NOVOLOG FLEXPEN) 100 UNIT/ML pen 1 units per 15 gram carb unit before breakfast, lunch and dinner 15 mL 1     insulin glargine 100 UNIT/ML pen Inject 27 Units subcutaneously daily. 15 mL 0     insulin glargine 100 UNIT/ML pen Inject 27 Units Subcutaneous daily 15 mL 3     insulin lispro (HUMALOG KWIKPEN) 100 UNIT/ML (1 unit dial) KWIKPEN 1 units per 15 gram carb unit before breakfast, lunch and dinner up to 20 units daily 15 mL 0     lisinopril (ZESTRIL) 20 MG tablet TAKE 1 TABLET(20 MG) BY MOUTH DAILY 90 tablet 0     oxyCODONE (ROXICODONE) 5 MG tablet Take 1 tablet (5 mg) by mouth every 6 hours as needed for severe pain. 12 tablet 0     tamsulosin (FLOMAX) 0.4 MG capsule Take 1 capsule (0.4 mg) by mouth daily. 7 capsule 0       Allergies  No Known Allergies      Family History  family history includes Alcoholism in his father; Asthma in his father; Colon Cancer in his maternal grandmother; Coronary Artery Disease in his maternal grandfather, mother, and paternal grandmother; Depression in his father, maternal grandfather, maternal grandmother, and mother; Diabetes in his maternal grandfather, maternal grandmother, mother, paternal grandfather, and paternal grandmother; Heart Disease in his mother; Hyperlipidemia in his father, maternal grandfather, maternal grandmother, and paternal grandmother; Hypertension in his father and mother; Lung Cancer in his father; Mental Illness in his  "father; Obesity in his mother; Stomach Problem in his mother.    Social History  Social History     Tobacco Use     Smoking status: Every Day     Current packs/day: 1.00     Types: Cigarettes     Smokeless tobacco: Never   Vaping Use     Vaping status: Former   Substance Use Topics     Alcohol use: No     Alcohol/week: 0.0 standard drinks of alcohol     Drug use: Not Currently     Types: Marijuana       Physical Exam  BP (!) 167/93 (BP Location: Left arm, Patient Position: Chair, Cuff Size: Adult Regular)   Pulse 75   Temp (!) 96.7  F (35.9  C) (Tympanic)   Resp 16   Ht 1.676 m (5' 5.98\")   Wt 84.6 kg (186 lb 8 oz)   SpO2 100%   BMI 30.12 kg/m    Body mass index is 30.12 kg/m .  GENERAL :  In no apparent distress  SKIN: Normal color, normal temperature, texture.  No hirsutism, alopecia or purple striae.     EYES: PERRL, EOMI, No scleral icterus,  No proptosis, conjunctival redness, stare, retraction  RESP: Lungs clear to auscultation bilaterally  CARDIAC: Regular rate and rhythm, normal S1 S2, without murmurs, rubs or gallops    NEURO: awake, alert, responds appropriately to questions.  Cranial nerves intact.   Moves all extremities; Gait normal.  No tremor of the outstretched hand.   EXTREMITIES: No clubbing, cyanosis or edema.    DATA REVIEW  Glooko Report  Time in target range 61%  High 32%, Very High 7%  Current Ave  mg/dl         Again, thank you for allowing me to participate in the care of your patient.        Sincerely,        Yuki Hendrix PA-C    Electronically signed"

## 2025-06-10 NOTE — PROGRESS NOTES
Assessment/Plan :   Type 2 DM. Ubaldo has been working hard to get his blood sugars under better control. His previous hemoglobin A1C had improved to 6.3%. He has continued to work on diet and exercise and he is feeling really good. His energy level is up and he has not had any problems with severe hyperglycemia and/or hypoglycemia. I encouraged him to keep up the good work. We will repeat a hemoglobin A1C today and we will follow-up in 6 mos.  HTN. Ubaldo's blood pressure is elevated today. He has had a few elevated readings, recently. These were around the time that he was in pain due to kidney stones. He has been taking lisinopril every evening. He did run out of the pills a couple days ago. I will send in a refill today. We will also check a basic metabolic panel today. I will contact him with the results.       I have independently reviewed and interpreted labs, imaging as indicated.      Chief complaint:  Ubaldo is a 34 year old male who returns for follow-up of type 2 diabetes.     I have reviewed Care Everywhere including [facility], [facility], [facility], [facility] , [facility], [place] lab reports, imaging reports and provider notes as indicated.      HISTORY OF PRESENT ILLNESS  Connor is doing well. He has been working hard to improve his diet and exercise. He was recently in the hospital with kidney stones. They explained that his diet and drinking soda can contribute to the development of stones, so he has been working hard to cut out all soda. He has focused on drinking more water and making healthy dietary decisions. He has noticed an improvement in his blood sugars and he is feeling much better. He has continued to take 27 unit(s) of Basaglar daily along with insulin lispro before meals based on an insulin to carb ratio of 1 unit(s) for every 15 grams of carbs.    Connor has been using the FreeStyle Laura sensor to monitor his blood sugars daily. He recently had a  sensor fail, so he has returned to fingerstick testing. He has been working hard to keep his blood sugars under 250 mg/dl. He has not had any recent episodes of severe hyperglycemia and/or hypoglycemia. He has noticed that the vision in his right eye has gotten blurrier. He is overdue for an eye exam. He has also had some numbness in his hands. He is not sure if this is diabetes related.     Connor was diagnosed with diabetes at the age of 18. He was started on metformin at the time of diagnosis. This worked well for a few years but over time his blood sugars started to increase and he was started on insulin. He has struggled with taking insulin over the years. Partly due to cost. He would like to look into options regarding insulin pump therapy but he cannot afford an insulin pump at present time. His diabetes is complicated by hyperlipidemia, HTN, DPN, and a history of acute pancreatitis and cholecystitis.     Endocrine relevant labs are as follows:   Latest Reference Range & Units 10/16/23 15:24   Hemoglobin A1C 0.0 - 5.6 % 6.3 (H)   (H): Data is abnormally high   Latest Reference Range & Units 07/15/23 05:50   Hemoglobin A1C <5.7 % 14.6 (H)   (H): Data is abnormally high   Latest Reference Range & Units 07/15/23 06:51   Albumin Urine mg/g Cr 0.00 - 17.00 mg/g Cr 726.32 (H)   (H): Data is abnormally high   Latest Reference Range & Units 07/15/23 06:51   Albumin Urine mg/L mg/L 276.0     REVIEW OF SYSTEMS    10 system ROS otherwise as per the HPI or negative    Past Medical History  Past Medical History:   Diagnosis Date    Cholelithiasis     Depressive disorder     Diabetes mellitus, type II     Gastroesophageal reflux disease     Hypertension     Hypertriglyceridemia     Kidney stone        Medications  Current Outpatient Medications   Medication Sig Dispense Refill    Glucagon (GVOKE HYPOPEN) 1 MG/0.2ML pen Inject the contents of 1 device under the skin into lower abdomen, outer thigh, or outer upper arm as  needed for hypoglycemia. If no response after 15 minutes, additional 1 mg dose from a new device may be injected while waiting for emergency assistance. 0.4 mL 1    ibuprofen (ADVIL/MOTRIN) 600 MG tablet Take 1 tablet (600 mg) by mouth every 6 hours as needed. 60 tablet 0    insulin aspart (NOVOLOG FLEXPEN) 100 UNIT/ML pen 1 units per 15 gram carb unit before breakfast, lunch and dinner 15 mL 1    insulin glargine 100 UNIT/ML pen Inject 27 Units subcutaneously daily. 15 mL 0    insulin glargine 100 UNIT/ML pen Inject 27 Units Subcutaneous daily 15 mL 3    insulin lispro (HUMALOG KWIKPEN) 100 UNIT/ML (1 unit dial) KWIKPEN 1 units per 15 gram carb unit before breakfast, lunch and dinner up to 20 units daily 15 mL 0    lisinopril (ZESTRIL) 20 MG tablet TAKE 1 TABLET(20 MG) BY MOUTH DAILY 90 tablet 0    oxyCODONE (ROXICODONE) 5 MG tablet Take 1 tablet (5 mg) by mouth every 6 hours as needed for severe pain. 12 tablet 0    tamsulosin (FLOMAX) 0.4 MG capsule Take 1 capsule (0.4 mg) by mouth daily. 7 capsule 0       Allergies  No Known Allergies      Family History  family history includes Alcoholism in his father; Asthma in his father; Colon Cancer in his maternal grandmother; Coronary Artery Disease in his maternal grandfather, mother, and paternal grandmother; Depression in his father, maternal grandfather, maternal grandmother, and mother; Diabetes in his maternal grandfather, maternal grandmother, mother, paternal grandfather, and paternal grandmother; Heart Disease in his mother; Hyperlipidemia in his father, maternal grandfather, maternal grandmother, and paternal grandmother; Hypertension in his father and mother; Lung Cancer in his father; Mental Illness in his father; Obesity in his mother; Stomach Problem in his mother.    Social History  Social History     Tobacco Use    Smoking status: Every Day     Current packs/day: 1.00     Types: Cigarettes    Smokeless tobacco: Never   Vaping Use    Vaping status: Former  "  Substance Use Topics    Alcohol use: No     Alcohol/week: 0.0 standard drinks of alcohol    Drug use: Not Currently     Types: Marijuana       Physical Exam  BP (!) 167/93 (BP Location: Left arm, Patient Position: Chair, Cuff Size: Adult Regular)   Pulse 75   Temp (!) 96.7  F (35.9  C) (Tympanic)   Resp 16   Ht 1.676 m (5' 5.98\")   Wt 84.6 kg (186 lb 8 oz)   SpO2 100%   BMI 30.12 kg/m    Body mass index is 30.12 kg/m .  GENERAL :  In no apparent distress  SKIN: Normal color, normal temperature, texture.  No hirsutism, alopecia or purple striae.     EYES: PERRL, EOMI, No scleral icterus,  No proptosis, conjunctival redness, stare, retraction  RESP: Lungs clear to auscultation bilaterally  CARDIAC: Regular rate and rhythm, normal S1 S2, without murmurs, rubs or gallops    NEURO: awake, alert, responds appropriately to questions.  Cranial nerves intact.   Moves all extremities; Gait normal.  No tremor of the outstretched hand.   EXTREMITIES: No clubbing, cyanosis or edema.    DATA REVIEW  Glooko Report  Time in target range 61%  High 32%, Very High 7%  Current Ave  mg/dl       "

## 2025-06-11 ENCOUNTER — RESULTS FOLLOW-UP (OUTPATIENT)
Dept: ENDOCRINOLOGY | Facility: CLINIC | Age: 35
End: 2025-06-11

## 2025-06-11 ENCOUNTER — PATIENT OUTREACH (OUTPATIENT)
Dept: CARE COORDINATION | Facility: CLINIC | Age: 35
End: 2025-06-11

## 2025-06-18 DIAGNOSIS — E11.65 TYPE 2 DIABETES MELLITUS WITH HYPERGLYCEMIA, WITH LONG-TERM CURRENT USE OF INSULIN (H): Primary | ICD-10-CM

## 2025-06-18 DIAGNOSIS — Z79.4 TYPE 2 DIABETES MELLITUS WITH HYPERGLYCEMIA, WITH LONG-TERM CURRENT USE OF INSULIN (H): Primary | ICD-10-CM

## 2025-06-18 NOTE — TELEPHONE ENCOUNTER
Refill request from pharmacy for [employer] 2 Sensor. To change every 14 days.     Provider approval needed.

## 2025-06-19 RX ORDER — BLOOD-GLUCOSE SENSOR
EACH MISCELLANEOUS
Qty: 6 EACH | Refills: 1 | Status: SHIPPED | OUTPATIENT
Start: 2025-06-19

## 2025-07-01 DIAGNOSIS — E11.628 TYPE 2 DIABETES MELLITUS WITH OTHER SKIN COMPLICATION, WITHOUT LONG-TERM CURRENT USE OF INSULIN (H): ICD-10-CM

## 2025-07-01 RX ORDER — INSULIN GLARGINE 100 [IU]/ML
27 INJECTION, SOLUTION SUBCUTANEOUS DAILY
Qty: 30 ML | Refills: 0 | Status: SHIPPED | OUTPATIENT
Start: 2025-07-01

## 2025-07-01 NOTE — TELEPHONE ENCOUNTER
Requested Prescriptions   Pending Prescriptions Disp Refills    insulin glargine 100 UNIT/ML pen 15 mL 0     Sig: Inject 27 Units subcutaneously daily.       Insulin Protocol Failed - 7/1/2025 12:03 PM        Failed - Chart review required     Review Chart.    Do not approve if insulin is used in a pump.  Instead, direct refill request to the patient's endocrinologist.  If the patient doesn't have an endocrinologist, then send the refill to the patient's PCP for review            Passed - HgbA1C in past 3 or 6 months     If HgbA1C is 8 or greater, it needs to be on file within the past 3 months.  If less than 8, must be on file within the past 6 months.     Recent Labs   Lab Test 06/10/25  0838   A1C 7.1*             Passed - Medication is active on med list and the sig matches. RN to manually verify dose and sig if red X/fail.     If the protocol passes (green check), you do not need to verify med dose and sig.    A prescription matches if they are the same clinical intention.    For Example: once daily and every morning are the same.    The protocol can not identify upper and lower case letters as matching and will fail.     For Example: Take 1 tablet (50 mg) by mouth daily     TAKE 1 TABLET (50 MG) BY MOUTH DAILY    For all fails (red x), verify dose and sig.    If the refill does match what is on file, the RN can still proceed to approve the refill request.       If they do not match, route to the appropriate provider.             Passed - Recent (6 month) or future (90 days) visit with the authorizing provider's specialty (provided they have been seen in the past 9 months)     The patient must have completed an in-person or virtual visit within the past 6 months or has a future visit scheduled within the next 90 days with the authorizing provider s specialty.  Urgent care and e-visits do not quality as an office visit for this protocol.          Passed - Medication indicated for associated diagnosis      Medication is associated with one or more of the following diagnoses:   - Type 1 diabetes mellitus  - Type 2 diabetes mellitus  - Diabetic nephropathy; Prophylaxis  - Neuropathy due to diabetes mellitus; Prophylaxis  - Retinopathy due to diabetes mellitus; Prophylaxis  - Diabetes mellitus associated with cystic fibrosis  - Disorder of cardiovascular system; Prophylaxis - Type 1 diabetes mellitus   - Disorder of cardiovascular system; Prophylaxis - Type 2 diabetes mellitus            Passed - Patient is 18 years of age or older